# Patient Record
Sex: FEMALE | Race: WHITE | NOT HISPANIC OR LATINO | URBAN - METROPOLITAN AREA
[De-identification: names, ages, dates, MRNs, and addresses within clinical notes are randomized per-mention and may not be internally consistent; named-entity substitution may affect disease eponyms.]

---

## 2017-03-29 ENCOUNTER — EMERGENCY (EMERGENCY)
Facility: HOSPITAL | Age: 57
LOS: 1 days | End: 2017-03-29
Payer: MEDICAID

## 2017-03-29 PROBLEM — Z00.00 ENCOUNTER FOR PREVENTIVE HEALTH EXAMINATION: Status: ACTIVE | Noted: 2017-03-29

## 2017-03-29 PROCEDURE — 71250 CT THORAX DX C-: CPT | Mod: 26

## 2017-03-29 PROCEDURE — 99284 EMERGENCY DEPT VISIT MOD MDM: CPT

## 2022-10-08 ENCOUNTER — INPATIENT (INPATIENT)
Facility: HOSPITAL | Age: 62
LOS: 3 days | Discharge: HOME | DRG: 435 | End: 2022-10-12
Attending: SURGERY | Admitting: SURGERY
Payer: COMMERCIAL

## 2022-10-08 VITALS
DIASTOLIC BLOOD PRESSURE: 69 MMHG | WEIGHT: 104.94 LBS | TEMPERATURE: 98 F | RESPIRATION RATE: 18 BRPM | SYSTOLIC BLOOD PRESSURE: 172 MMHG | OXYGEN SATURATION: 98 % | HEART RATE: 66 BPM | HEIGHT: 64 IN

## 2022-10-08 DIAGNOSIS — K86.89 OTHER SPECIFIED DISEASES OF PANCREAS: ICD-10-CM

## 2022-10-08 LAB
APPEARANCE UR: CLEAR — SIGNIFICANT CHANGE UP
BILIRUB UR-MCNC: ABNORMAL
COLOR SPEC: YELLOW — SIGNIFICANT CHANGE UP
DIFF PNL FLD: NEGATIVE — SIGNIFICANT CHANGE UP
GLUCOSE UR QL: NEGATIVE — SIGNIFICANT CHANGE UP
KETONES UR-MCNC: ABNORMAL
LEUKOCYTE ESTERASE UR-ACNC: ABNORMAL
NITRITE UR-MCNC: NEGATIVE — SIGNIFICANT CHANGE UP
PH UR: 6.5 — SIGNIFICANT CHANGE UP (ref 5–8)
PROT UR-MCNC: 15
SP GR SPEC: 1 — LOW (ref 1.01–1.02)
UROBILINOGEN FLD QL: 4

## 2022-10-08 PROCEDURE — 88341 IMHCHEM/IMCYTCHM EA ADD ANTB: CPT

## 2022-10-08 PROCEDURE — 85610 PROTHROMBIN TIME: CPT

## 2022-10-08 PROCEDURE — 86901 BLOOD TYPING SEROLOGIC RH(D): CPT

## 2022-10-08 PROCEDURE — 86803 HEPATITIS C AB TEST: CPT

## 2022-10-08 PROCEDURE — 80076 HEPATIC FUNCTION PANEL: CPT

## 2022-10-08 PROCEDURE — 99222 1ST HOSP IP/OBS MODERATE 55: CPT

## 2022-10-08 PROCEDURE — 76000 FLUOROSCOPY <1 HR PHYS/QHP: CPT

## 2022-10-08 PROCEDURE — 80048 BASIC METABOLIC PNL TOTAL CA: CPT

## 2022-10-08 PROCEDURE — 83735 ASSAY OF MAGNESIUM: CPT

## 2022-10-08 PROCEDURE — 81001 URINALYSIS AUTO W/SCOPE: CPT

## 2022-10-08 PROCEDURE — U0003: CPT

## 2022-10-08 PROCEDURE — 99285 EMERGENCY DEPT VISIT HI MDM: CPT

## 2022-10-08 PROCEDURE — 36415 COLL VENOUS BLD VENIPUNCTURE: CPT

## 2022-10-08 PROCEDURE — C1769: CPT

## 2022-10-08 PROCEDURE — 93010 ELECTROCARDIOGRAM REPORT: CPT

## 2022-10-08 PROCEDURE — 88172 CYTP DX EVAL FNA 1ST EA SITE: CPT

## 2022-10-08 PROCEDURE — U0005: CPT

## 2022-10-08 PROCEDURE — 82150 ASSAY OF AMYLASE: CPT

## 2022-10-08 PROCEDURE — 85730 THROMBOPLASTIN TIME PARTIAL: CPT

## 2022-10-08 PROCEDURE — 71046 X-RAY EXAM CHEST 2 VIEWS: CPT | Mod: 26

## 2022-10-08 PROCEDURE — 83935 ASSAY OF URINE OSMOLALITY: CPT

## 2022-10-08 PROCEDURE — 84134 ASSAY OF PREALBUMIN: CPT

## 2022-10-08 PROCEDURE — 80053 COMPREHEN METABOLIC PANEL: CPT

## 2022-10-08 PROCEDURE — 86850 RBC ANTIBODY SCREEN: CPT

## 2022-10-08 PROCEDURE — 88307 TISSUE EXAM BY PATHOLOGIST: CPT

## 2022-10-08 PROCEDURE — 82247 BILIRUBIN TOTAL: CPT

## 2022-10-08 PROCEDURE — 85025 COMPLETE CBC W/AUTO DIFF WBC: CPT

## 2022-10-08 PROCEDURE — 82248 BILIRUBIN DIRECT: CPT

## 2022-10-08 PROCEDURE — C1876: CPT

## 2022-10-08 PROCEDURE — 85027 COMPLETE CBC AUTOMATED: CPT

## 2022-10-08 PROCEDURE — 83690 ASSAY OF LIPASE: CPT

## 2022-10-08 PROCEDURE — 93005 ELECTROCARDIOGRAM TRACING: CPT

## 2022-10-08 PROCEDURE — 84100 ASSAY OF PHOSPHORUS: CPT

## 2022-10-08 PROCEDURE — 86900 BLOOD TYPING SEROLOGIC ABO: CPT

## 2022-10-08 PROCEDURE — 88342 IMHCHEM/IMCYTCHM 1ST ANTB: CPT

## 2022-10-08 RX ORDER — ONDANSETRON 8 MG/1
4 TABLET, FILM COATED ORAL EVERY 6 HOURS
Refills: 0 | Status: DISCONTINUED | OUTPATIENT
Start: 2022-10-08 | End: 2022-10-12

## 2022-10-08 RX ORDER — SENNA PLUS 8.6 MG/1
2 TABLET ORAL AT BEDTIME
Refills: 0 | Status: DISCONTINUED | OUTPATIENT
Start: 2022-10-08 | End: 2022-10-12

## 2022-10-08 RX ORDER — KETOROLAC TROMETHAMINE 30 MG/ML
15 SYRINGE (ML) INJECTION ONCE
Refills: 0 | Status: DISCONTINUED | OUTPATIENT
Start: 2022-10-08 | End: 2022-10-11

## 2022-10-08 RX ORDER — ACETAMINOPHEN 500 MG
650 TABLET ORAL EVERY 6 HOURS
Refills: 0 | Status: DISCONTINUED | OUTPATIENT
Start: 2022-10-08 | End: 2022-10-12

## 2022-10-08 RX ORDER — OXYCODONE AND ACETAMINOPHEN 5; 325 MG/1; MG/1
2 TABLET ORAL EVERY 6 HOURS
Refills: 0 | Status: DISCONTINUED | OUTPATIENT
Start: 2022-10-08 | End: 2022-10-12

## 2022-10-08 RX ORDER — ZOLPIDEM TARTRATE 10 MG/1
5 TABLET ORAL AT BEDTIME
Refills: 0 | Status: DISCONTINUED | OUTPATIENT
Start: 2022-10-08 | End: 2022-10-12

## 2022-10-08 NOTE — ED PROVIDER NOTE - CLINICAL SUMMARY MEDICAL DECISION MAKING FREE TEXT BOX
Jaundice and abdominal pain with obstructing pancreatic mass.  Will admit to medicine with GI eval. Jaundice and abdominal pain with obstructing pancreatic mass.  Will admit to Surg Onc.

## 2022-10-08 NOTE — ED ADULT NURSE NOTE - CHIEF COMPLAINT QUOTE
Pt BIBEMS as transfer from [facility]. Pt states "I have been having abdominal pain they told me I had to be sent here because you guys have the doctors to treat me." Pt observed to have jaundice to scleras. As per EMS "We were told she has a mass near pancreatic head and gallbladder enlarged.

## 2022-10-08 NOTE — ED PROVIDER NOTE - PROGRESS NOTE DETAILS
Discussed case with surgery resident who already placed admission orders and wants patient admitted to .

## 2022-10-08 NOTE — ED PROVIDER NOTE - GASTROINTESTINAL, MLM
Abdomen soft, nondistended; nontender.  no rebound or guarding. scaphoid thin abdomen soft, nondistended; nontender.  no rebound or guarding.

## 2022-10-08 NOTE — ED ADULT NURSE NOTE - NSFALLRSKINDICATORS_ED_ALL_ED
[>50% of Time Spent on Coordination of Care for  ___] : Greater than 50% of the encounter time was spent on coordination of care for [unfilled] [Time Spent: ___ minutes] : I have spent [unfilled] minutes of face to face time with the patient no

## 2022-10-08 NOTE — ED PROVIDER NOTE - OBJECTIVE STATEMENT
Pt. is a 60 yo F with hx of COPD and occasional alcohol use presents as a transfer from [facility] [place] for pancreatic mass causing biliary obstruction.  Patient has jaundice and has had some mild abdominal pain for about a week. Patient was seen at an urgent care yesterday and had labs and US showing hyponatremia, T Bili of 20, and Abdominal US showing pancreatic lesion.  Patient states she was sent to the ER where she had workup including labs, urine, US and CT and was transferred here for GI and Surg onc evaluation. Pt. is a 62 yo F with hx of COPD and occasional alcohol use [place] a [place] [facility] [place] causing biliary obstruction.  Patient has jaundice and has had some mild abdominal pain for about a week. Patient was seen at an urgent care yesterday and had labs and US showing hyponatremia, T Bili of 20, and Abdominal US showing pancreatic lesion.  Patient states she was sent to the ER where she had workup including labs, urine, US and CT and was transferred here for GI and Surg onc evaluation, accepted by Dr. Tolentino. Pt. is a 62 yo F with hx of COPD and occasional alcohol use [place] [facility] [place].  Patient has jaundice which prompted her to seek medical care yesterday. Patient was seen at an urgent care yesterday and had labs and US showing hyponatremia, T Bili of 20, and Abdominal US showing pancreatic lesion.  Patient states she was sent to the ER in [place] around 7am this morning where she had workup including labs, urine, US and CT and was transferred here for Surg onc evaluation, accepted by Dr. Tolentino.

## 2022-10-08 NOTE — ED ADULT NURSE NOTE - OBJECTIVE STATEMENT
Pt BIBEMS as transfer from [facility]. Pt states "I have been having abdominal pain they told me I had to be sent here because you guys have the doctors to treat me." Pt observed to have jaundice at arrival. As per EMS "We were told she has a mass near pancreatic head and gallbladder enlarged.

## 2022-10-08 NOTE — ED ADULT TRIAGE NOTE - CHIEF COMPLAINT QUOTE
Pt BIBEMS as transfer from [facility]. Pt states "I have been having abdominal pain they told me I had to be sent here because you guys have the doctors to treat me." Pt observed to have jaundice to scleras. As per EMS "We were told she has a mass near pancreatic head and gallbladder enlarged.

## 2022-10-09 LAB
ABO RH CONFIRMATION: SIGNIFICANT CHANGE UP
ALBUMIN SERPL ELPH-MCNC: 2.5 G/DL — LOW (ref 3.3–5)
ALP SERPL-CCNC: >2330 U/L — HIGH (ref 40–120)
ALT FLD-CCNC: 948 U/L — HIGH (ref 12–78)
AMYLASE P1 CFR SERPL: 34 U/L — SIGNIFICANT CHANGE UP (ref 25–115)
ANION GAP SERPL CALC-SCNC: 8 MMOL/L — SIGNIFICANT CHANGE UP (ref 5–17)
APTT BLD: 32.6 SEC — SIGNIFICANT CHANGE UP (ref 27.5–35.5)
AST SERPL-CCNC: 807 U/L — HIGH (ref 15–37)
BASOPHILS # BLD AUTO: 0.07 K/UL — SIGNIFICANT CHANGE UP (ref 0–0.2)
BASOPHILS NFR BLD AUTO: 0.9 % — SIGNIFICANT CHANGE UP (ref 0–2)
BILIRUB SERPL-MCNC: 18.7 MG/DL — HIGH (ref 0.2–1.2)
BUN SERPL-MCNC: 7 MG/DL — SIGNIFICANT CHANGE UP (ref 7–23)
CALCIUM SERPL-MCNC: 9.1 MG/DL — SIGNIFICANT CHANGE UP (ref 8.5–10.1)
CHLORIDE SERPL-SCNC: 92 MMOL/L — LOW (ref 96–108)
CO2 SERPL-SCNC: 28 MMOL/L — SIGNIFICANT CHANGE UP (ref 22–31)
CREAT SERPL-MCNC: 0.53 MG/DL — SIGNIFICANT CHANGE UP (ref 0.5–1.3)
EGFR: 104 ML/MIN/1.73M2 — SIGNIFICANT CHANGE UP
EOSINOPHIL # BLD AUTO: 0.06 K/UL — SIGNIFICANT CHANGE UP (ref 0–0.5)
EOSINOPHIL NFR BLD AUTO: 0.8 % — SIGNIFICANT CHANGE UP (ref 0–6)
GLUCOSE SERPL-MCNC: 91 MG/DL — SIGNIFICANT CHANGE UP (ref 70–99)
HCT VFR BLD CALC: 29.4 % — LOW (ref 34.5–45)
HCV AB S/CO SERPL IA: 0.09 S/CO — SIGNIFICANT CHANGE UP (ref 0–0.99)
HCV AB SERPL-IMP: SIGNIFICANT CHANGE UP
HGB BLD-MCNC: 10.4 G/DL — LOW (ref 11.5–15.5)
IMM GRANULOCYTES NFR BLD AUTO: 0.5 % — SIGNIFICANT CHANGE UP (ref 0–0.9)
INR BLD: 1.05 RATIO — SIGNIFICANT CHANGE UP (ref 0.88–1.16)
LIDOCAIN IGE QN: 206 U/L — SIGNIFICANT CHANGE UP (ref 73–393)
LYMPHOCYTES # BLD AUTO: 0.96 K/UL — LOW (ref 1–3.3)
LYMPHOCYTES # BLD AUTO: 12.6 % — LOW (ref 13–44)
MAGNESIUM SERPL-MCNC: 2.1 MG/DL — SIGNIFICANT CHANGE UP (ref 1.6–2.6)
MCHC RBC-ENTMCNC: 30.9 PG — SIGNIFICANT CHANGE UP (ref 27–34)
MCHC RBC-ENTMCNC: 35.4 GM/DL — SIGNIFICANT CHANGE UP (ref 32–36)
MCV RBC AUTO: 87.2 FL — SIGNIFICANT CHANGE UP (ref 80–100)
MONOCYTES # BLD AUTO: 0.58 K/UL — SIGNIFICANT CHANGE UP (ref 0–0.9)
MONOCYTES NFR BLD AUTO: 7.6 % — SIGNIFICANT CHANGE UP (ref 2–14)
NEUTROPHILS # BLD AUTO: 5.91 K/UL — SIGNIFICANT CHANGE UP (ref 1.8–7.4)
NEUTROPHILS NFR BLD AUTO: 77.6 % — HIGH (ref 43–77)
OSMOLALITY UR: 226 MOSM/KG — SIGNIFICANT CHANGE UP (ref 50–1200)
PHOSPHATE SERPL-MCNC: 3 MG/DL — SIGNIFICANT CHANGE UP (ref 2.5–4.5)
PLATELET # BLD AUTO: 389 K/UL — SIGNIFICANT CHANGE UP (ref 150–400)
POTASSIUM SERPL-MCNC: 3.2 MMOL/L — LOW (ref 3.5–5.3)
POTASSIUM SERPL-SCNC: 3.2 MMOL/L — LOW (ref 3.5–5.3)
PREALB SERPL-MCNC: 8 MG/DL — LOW (ref 20–40)
PROT SERPL-MCNC: 6.1 GM/DL — SIGNIFICANT CHANGE UP (ref 6–8.3)
PROTHROM AB SERPL-ACNC: 12.2 SEC — SIGNIFICANT CHANGE UP (ref 10.5–13.4)
RBC # BLD: 3.37 M/UL — LOW (ref 3.8–5.2)
RBC # FLD: 18.5 % — HIGH (ref 10.3–14.5)
SODIUM SERPL-SCNC: 128 MMOL/L — LOW (ref 135–145)
WBC # BLD: 7.62 K/UL — SIGNIFICANT CHANGE UP (ref 3.8–10.5)
WBC # FLD AUTO: 7.62 K/UL — SIGNIFICANT CHANGE UP (ref 3.8–10.5)

## 2022-10-09 PROCEDURE — 99232 SBSQ HOSP IP/OBS MODERATE 35: CPT

## 2022-10-09 RX ORDER — HEPARIN SODIUM 5000 [USP'U]/ML
5000 INJECTION INTRAVENOUS; SUBCUTANEOUS EVERY 8 HOURS
Refills: 0 | Status: DISCONTINUED | OUTPATIENT
Start: 2022-10-09 | End: 2022-10-12

## 2022-10-09 RX ORDER — INFLUENZA VIRUS VACCINE 15; 15; 15; 15 UG/.5ML; UG/.5ML; UG/.5ML; UG/.5ML
0.5 SUSPENSION INTRAMUSCULAR ONCE
Refills: 0 | Status: DISCONTINUED | OUTPATIENT
Start: 2022-10-09 | End: 2022-10-12

## 2022-10-09 RX ADMIN — HEPARIN SODIUM 5000 UNIT(S): 5000 INJECTION INTRAVENOUS; SUBCUTANEOUS at 13:03

## 2022-10-09 RX ADMIN — ZOLPIDEM TARTRATE 5 MILLIGRAM(S): 10 TABLET ORAL at 21:27

## 2022-10-09 RX ADMIN — Medication 650 MILLIGRAM(S): at 19:32

## 2022-10-09 RX ADMIN — HEPARIN SODIUM 5000 UNIT(S): 5000 INJECTION INTRAVENOUS; SUBCUTANEOUS at 21:24

## 2022-10-09 NOTE — H&P ADULT - HISTORY OF PRESENT ILLNESS
62 yo F with hx of COPD (no inhaler), 1 ppd cigarette x 30+ years, social ETOH use presents as a transfer from [facility] w/ yellowing of skin noticed on 9/30 and mild right sided abdominal pain graded 2/10. Patient was seen at an urgent care yesterday and had labs and US showing hyponatremia 129, T Bili: 20, and Abdominal US showing pancreatic lesion.  Patient states she was sent to the [place] ER where she had workup including labs, urine, US and CT and was transferred here. Denies loss of appetite, unintentional weight loss, fevers, chills, change in bowel habits, nausea, vomiting.   PE: Jaundiced, mild RUQ tenderness  CT: Infiltrative mass involving the inferior pancreatic head and third portion of the duodenum. Differential includes primary pancreatic adenocarcinoma and duodenal carcinoma. Tumor encasement of the SMA. Distal CBD obstruction by tumor with moderate upstream biliary ductal dilatation.      MEDICATIONS  (STANDING):    MEDICATIONS  (PRN):  acetaminophen     Tablet .. 650 milliGRAM(s) Oral every 6 hours PRN Mild Pain (1 - 3)  ketorolac   Injectable 15 milliGRAM(s) IV Push once PRN Moderate Pain (4 - 6)  ondansetron Injectable 4 milliGRAM(s) IV Push every 6 hours PRN Nausea  oxycodone    5 mG/acetaminophen 325 mG 2 Tablet(s) Oral every 6 hours PRN Severe Pain (7 - 10)  senna 2 Tablet(s) Oral at bedtime PRN Constipation  zolpidem 5 milliGRAM(s) Oral at bedtime PRN Insomnia      PAST MEDICAL & SURGICAL HISTORY:  COPD (chronic obstructive pulmonary disease)          Vital Signs Last 24 Hrs  T(C): 36.2 (08 Oct 2022 23:04), Max: 36.6 (08 Oct 2022 22:19)  T(F): 97.2 (08 Oct 2022 23:04), Max: 97.8 (08 Oct 2022 22:19)  HR: 65 (08 Oct 2022 23:04) (65 - 66)  BP: 179/70 (08 Oct 2022 23:04) (172/69 - 179/70)  BP(mean): --  RR: 18 (08 Oct 2022 23:04) (18 - 18)  SpO2: 100% (08 Oct 2022 23:04) (98% - 100%)    Parameters below as of 08 Oct 2022 23:04  Patient On (Oxygen Delivery Method): room air        I&O's Summary                            10.4   7.62  )-----------( 389      ( 08 Oct 2022 23:05 )             29.4

## 2022-10-09 NOTE — H&P ADULT - NSHPPHYSICALEXAM_GEN_ALL_CORE
· CONSTITUTIONAL: Well appearing, awake, alert, oriented to person, place, time/situation and in no apparent distress.  · ENMT: Airway patent, Nasal mucosa clear. Mouth with normal mucosa. Throat has no vesicles, no oropharyngeal exudates and uvula is midline.  · EYES: +scleral icterus; pupils equal, round and reactive to light.  · CARDIAC: Normal rate, regular rhythm.  Heart sounds S1, S2.  No murmurs, rubs or gallops.  · RESPIRATORY: Breath sounds clear and equal bilaterally.  · GASTROINTESTINAL: scaphoid thin abdomen soft, nondistended; nontender.  no rebound or guarding.    · MUSCULOSKELETAL: Spine appears normal, range of motion is not limited, no muscle or joint tenderness  · NEUROLOGICAL: Alert and oriented, no focal deficits, no motor or sensory deficits.  · SKIN: +diffuse jaundice    · PSYCHIATRIC: Alert and oriented to person, place, time/situation. normal mood and affect. no apparent risk to self or others.  · HEME LYMPH: No adenopathy or splenomegaly. No cervical or inguinal lymphadenopathy.

## 2022-10-09 NOTE — H&P ADULT - NSHPLABSRESULTS_GEN_ALL_CORE
MEDICATIONS  (STANDING):    MEDICATIONS  (PRN):  acetaminophen     Tablet .. 650 milliGRAM(s) Oral every 6 hours PRN Mild Pain (1 - 3)  ketorolac   Injectable 15 milliGRAM(s) IV Push once PRN Moderate Pain (4 - 6)  ondansetron Injectable 4 milliGRAM(s) IV Push every 6 hours PRN Nausea  oxycodone    5 mG/acetaminophen 325 mG 2 Tablet(s) Oral every 6 hours PRN Severe Pain (7 - 10)  senna 2 Tablet(s) Oral at bedtime PRN Constipation  zolpidem 5 milliGRAM(s) Oral at bedtime PRN Insomnia      PAST MEDICAL & SURGICAL HISTORY:  COPD (chronic obstructive pulmonary disease)          Vital Signs Last 24 Hrs  T(C): 36.2 (08 Oct 2022 23:04), Max: 36.6 (08 Oct 2022 22:19)  T(F): 97.2 (08 Oct 2022 23:04), Max: 97.8 (08 Oct 2022 22:19)  HR: 65 (08 Oct 2022 23:04) (65 - 66)  BP: 179/70 (08 Oct 2022 23:04) (172/69 - 179/70)  BP(mean): --  RR: 18 (08 Oct 2022 23:04) (18 - 18)  SpO2: 100% (08 Oct 2022 23:04) (98% - 100%)    Parameters below as of 08 Oct 2022 23:04  Patient On (Oxygen Delivery Method): room air        I&O's Summary                            10.4   7.62  )-----------( 389      ( 08 Oct 2022 23:05 )             29.4       CT: Infiltrative mass involving the inferior pancreatic head and third portion of the duodenum. Differential includes primary pancreatic adenocarcinoma and duodenal carcinoma. Tumor encasement of the SMA. Distal CBD obstruction by tumor with moderate upstream biliary ductal dilatation.

## 2022-10-09 NOTE — H&P ADULT - ASSESSMENT
61 F w/ pancreatic head mass causing biliary obstruction     Plan:   Regular diet as tolerated   Pain control PRN   Anti-emetics   F/u GI consult for stenting   F/u Med onc consult to establish care- SMA encasement (likely not surgical candidate)  NPO @ midnight on Sunday for possible GI stenting Monday AM  trend LFTs daily w/ labs     Plan discussed with Dr. Tolentino

## 2022-10-10 LAB
ALBUMIN SERPL ELPH-MCNC: 2.2 G/DL — LOW (ref 3.3–5)
ALP SERPL-CCNC: >2330 U/L — SIGNIFICANT CHANGE UP (ref 40–120)
ALT FLD-CCNC: 971 U/L — HIGH (ref 12–78)
ANION GAP SERPL CALC-SCNC: 8 MMOL/L — SIGNIFICANT CHANGE UP (ref 5–17)
AST SERPL-CCNC: 928 U/L — HIGH (ref 15–37)
BILIRUB DIRECT SERPL-MCNC: 14.2 MG/DL — HIGH (ref 0–0.3)
BILIRUB SERPL-MCNC: 17.4 MG/DL — HIGH (ref 0.2–1.2)
BUN SERPL-MCNC: 7 MG/DL — SIGNIFICANT CHANGE UP (ref 7–23)
CALCIUM SERPL-MCNC: 8.7 MG/DL — SIGNIFICANT CHANGE UP (ref 8.5–10.1)
CHLORIDE SERPL-SCNC: 93 MMOL/L — LOW (ref 96–108)
CO2 SERPL-SCNC: 28 MMOL/L — SIGNIFICANT CHANGE UP (ref 22–31)
CREAT SERPL-MCNC: 0.63 MG/DL — SIGNIFICANT CHANGE UP (ref 0.5–1.3)
EGFR: 100 ML/MIN/1.73M2 — SIGNIFICANT CHANGE UP
GLUCOSE SERPL-MCNC: 102 MG/DL — HIGH (ref 70–99)
HCT VFR BLD CALC: 30.9 % — LOW (ref 34.5–45)
HGB BLD-MCNC: 11 G/DL — LOW (ref 11.5–15.5)
MAGNESIUM SERPL-MCNC: 2.3 MG/DL — SIGNIFICANT CHANGE UP (ref 1.6–2.6)
MCHC RBC-ENTMCNC: 30.2 PG — SIGNIFICANT CHANGE UP (ref 27–34)
MCHC RBC-ENTMCNC: 35.6 GM/DL — SIGNIFICANT CHANGE UP (ref 32–36)
MCV RBC AUTO: 84.9 FL — SIGNIFICANT CHANGE UP (ref 80–100)
PHOSPHATE SERPL-MCNC: 2.9 MG/DL — SIGNIFICANT CHANGE UP (ref 2.5–4.5)
PLATELET # BLD AUTO: 409 K/UL — HIGH (ref 150–400)
POTASSIUM SERPL-MCNC: 2.9 MMOL/L — CRITICAL LOW (ref 3.5–5.3)
POTASSIUM SERPL-SCNC: 2.9 MMOL/L — CRITICAL LOW (ref 3.5–5.3)
PROT SERPL-MCNC: 5.8 GM/DL — LOW (ref 6–8.3)
RBC # BLD: 3.64 M/UL — LOW (ref 3.8–5.2)
RBC # FLD: 18.1 % — HIGH (ref 10.3–14.5)
SARS-COV-2 RNA SPEC QL NAA+PROBE: SIGNIFICANT CHANGE UP
SODIUM SERPL-SCNC: 129 MMOL/L — LOW (ref 135–145)
WBC # BLD: 7.18 K/UL — SIGNIFICANT CHANGE UP (ref 3.8–10.5)
WBC # FLD AUTO: 7.18 K/UL — SIGNIFICANT CHANGE UP (ref 3.8–10.5)

## 2022-10-10 PROCEDURE — 99222 1ST HOSP IP/OBS MODERATE 55: CPT

## 2022-10-10 PROCEDURE — 99232 SBSQ HOSP IP/OBS MODERATE 35: CPT

## 2022-10-10 RX ORDER — SIMETHICONE 80 MG/1
80 TABLET, CHEWABLE ORAL EVERY 12 HOURS
Refills: 0 | Status: DISCONTINUED | OUTPATIENT
Start: 2022-10-10 | End: 2022-10-12

## 2022-10-10 RX ORDER — POTASSIUM CHLORIDE 20 MEQ
20 PACKET (EA) ORAL
Refills: 0 | Status: COMPLETED | OUTPATIENT
Start: 2022-10-10 | End: 2022-10-11

## 2022-10-10 RX ORDER — POTASSIUM CHLORIDE 20 MEQ
10 PACKET (EA) ORAL
Refills: 0 | Status: COMPLETED | OUTPATIENT
Start: 2022-10-10 | End: 2022-10-10

## 2022-10-10 RX ADMIN — Medication 20 MILLIEQUIVALENT(S): at 23:23

## 2022-10-10 RX ADMIN — SIMETHICONE 80 MILLIGRAM(S): 80 TABLET, CHEWABLE ORAL at 21:26

## 2022-10-10 RX ADMIN — HEPARIN SODIUM 5000 UNIT(S): 5000 INJECTION INTRAVENOUS; SUBCUTANEOUS at 13:24

## 2022-10-10 RX ADMIN — Medication 20 MILLIEQUIVALENT(S): at 21:26

## 2022-10-10 RX ADMIN — Medication 100 MILLIEQUIVALENT(S): at 09:48

## 2022-10-10 RX ADMIN — HEPARIN SODIUM 5000 UNIT(S): 5000 INJECTION INTRAVENOUS; SUBCUTANEOUS at 20:53

## 2022-10-10 RX ADMIN — Medication 100 MILLIEQUIVALENT(S): at 11:19

## 2022-10-10 RX ADMIN — Medication 100 MILLIEQUIVALENT(S): at 08:07

## 2022-10-10 NOTE — CONSULT NOTE ADULT - SUBJECTIVE AND OBJECTIVE BOX
HPI:  62 yo F with hx of COPD (no inhaler), 1 ppd cigarette x 30+ years, social ETOH use presents as a transfer from [facility] [place] w/ yellowing of skin noticed on 9/30 and mild right sided abdominal pain graded 2/10. Patient was seen at an urgent care yesterday and had labs and US showing hyponatremia 129, T Bili: 20, and Abdominal US showing pancreatic lesion.  Patient states she was sent to the [place] ER where she had workup including labs, urine, US and CT and was transferred here. Denies loss of appetite, unintentional weight loss, fevers, chills, change in bowel habits, nausea, vomiting.   PE: Jaundiced, mild RUQ tenderness  CT: Infiltrative mass involving the inferior pancreatic head and third portion of the duodenum. Differential includes primary pancreatic adenocarcinoma and duodenal carcinoma. Tumor encasement of the SMA. Distal CBD obstruction by tumor with moderate upstream biliary ductal dilatation.      MEDICATIONS  (STANDING):    MEDICATIONS  (PRN):  acetaminophen     Tablet .. 650 milliGRAM(s) Oral every 6 hours PRN Mild Pain (1 - 3)  ketorolac   Injectable 15 milliGRAM(s) IV Push once PRN Moderate Pain (4 - 6)  ondansetron Injectable 4 milliGRAM(s) IV Push every 6 hours PRN Nausea  oxycodone    5 mG/acetaminophen 325 mG 2 Tablet(s) Oral every 6 hours PRN Severe Pain (7 - 10)  senna 2 Tablet(s) Oral at bedtime PRN Constipation  zolpidem 5 milliGRAM(s) Oral at bedtime PRN Insomnia      PAST MEDICAL & SURGICAL HISTORY:  COPD (chronic obstructive pulmonary disease)    Social History:    FAMILY HISTORY:    ROS : denies pain or weight loss           Vital Signs Last 24 Hrs  T(C): 36.6 (10 Oct 2022 08:06), Max: 37.1 (09 Oct 2022 21:09)  T(F): 97.9 (10 Oct 2022 08:06), Max: 98.7 (09 Oct 2022 21:09)  HR: 74 (10 Oct 2022 08:06) (74 - 82)  BP: 137/75 (10 Oct 2022 08:06) (137/75 - 156/65)  BP(mean): --  RR: 17 (10 Oct 2022 08:06) (17 - 18)  SpO2: 98% (10 Oct 2022 08:06) (98% - 100%)    Parameters below as of 10 Oct 2022 08:06  Patient On (Oxygen Delivery Method): room air                          HPI:  60 yo F with hx of COPD (no inhaler), 1 ppd cigarette x 30+ years, social ETOH use presents as a transfer from [facility] [place] w/ yellowing of skin noticed on 9/30 and mild right sided abdominal pain graded 2/10. Patient was seen at an urgent care yesterday and had labs and US showing hyponatremia 129, T Bili: 20, and Abdominal US showing pancreatic lesion.  Patient states she was sent to the [place] ER where she had workup including labs, urine, US and CT and was transferred here. Denies loss of appetite, unintentional weight loss, fevers, chills, change in bowel habits, nausea, vomiting.   PE: Jaundiced, mild RUQ tenderness  CT: Infiltrative mass involving the inferior pancreatic head and third portion of the duodenum. Differential includes primary pancreatic adenocarcinoma and duodenal carcinoma. Tumor encasement of the SMA. Distal CBD obstruction by tumor with moderate upstream biliary ductal dilatation.      MEDICATIONS  (STANDING):    MEDICATIONS  (PRN):  acetaminophen     Tablet .. 650 milliGRAM(s) Oral every 6 hours PRN Mild Pain (1 - 3)  ketorolac   Injectable 15 milliGRAM(s) IV Push once PRN Moderate Pain (4 - 6)  ondansetron Injectable 4 milliGRAM(s) IV Push every 6 hours PRN Nausea  oxycodone    5 mG/acetaminophen 325 mG 2 Tablet(s) Oral every 6 hours PRN Severe Pain (7 - 10)  senna 2 Tablet(s) Oral at bedtime PRN Constipation  zolpidem 5 milliGRAM(s) Oral at bedtime PRN Insomnia      PAST MEDICAL & SURGICAL HISTORY:  COPD (chronic obstructive pulmonary disease)    Social History: smoker, social alcohol use, no drugs    FAMILY HISTORY: no Hx of GI cancers     ROS : denies pain or weight loss     Vital Signs Last 24 Hrs  T(C): 36.6 (10 Oct 2022 08:06), Max: 37.1 (09 Oct 2022 21:09)  T(F): 97.9 (10 Oct 2022 08:06), Max: 98.7 (09 Oct 2022 21:09)  HR: 74 (10 Oct 2022 08:06) (74 - 82)  BP: 137/75 (10 Oct 2022 08:06) (137/75 - 156/65)  BP(mean): --  RR: 17 (10 Oct 2022 08:06) (17 - 18)  SpO2: 98% (10 Oct 2022 08:06) (98% - 100%)    Parameters below as of 10 Oct 2022 08:06  Patient On (Oxygen Delivery Method): room air      Patient seen today in AM    Thin female   Jaundiced  No adenopathy  Lungs clear  Heart RRR  Abd soft non tender no ascites   Extr no edema  Neuro non focal  Psych normal affect                           11.0   7.18  )-----------( 409      ( 10 Oct 2022 06:30 )             30.9     Ct abd pelvis with iv contrast : infiltrative process in distal pancreatic head / duodenum , distal CBD obstruction, no liver mets or  overt adenopathy

## 2022-10-10 NOTE — CONSULT NOTE ADULT - SUBJECTIVE AND OBJECTIVE BOX
Patient is a 61y old  Female who presents with a chief complaint of Pancreatic mass (10 Oct 2022 06:20)    full note to follow      HPI:  62 yo F with hx of COPD (no inhaler), 1 ppd cigarette x 30+ years, social ETOH use presents as a transfer from [facility] [place] w/ yellowing of skin noticed on 9/30 and mild right sided abdominal pain graded 2/10. Patient was seen at an urgent care yesterday and had labs and US showing hyponatremia 129, T Bili: 20, and Abdominal US showing pancreatic lesion.  Patient states she was sent to the [place] ER where she had workup including labs, urine, US and CT and was transferred here. Denies loss of appetite, unintentional weight loss, fevers, chills, change in bowel habits, nausea, vomiting.   PE: Jaundiced, mild RUQ tenderness  CT: Infiltrative mass involving the inferior pancreatic head and third portion of the duodenum. Differential includes primary pancreatic adenocarcinoma and duodenal carcinoma. Tumor encasement of the SMA. Distal CBD obstruction by tumor with moderate upstream biliary ductal dilatation.      MEDICATIONS  (STANDING):    MEDICATIONS  (PRN):  acetaminophen     Tablet .. 650 milliGRAM(s) Oral every 6 hours PRN Mild Pain (1 - 3)  ketorolac   Injectable 15 milliGRAM(s) IV Push once PRN Moderate Pain (4 - 6)  ondansetron Injectable 4 milliGRAM(s) IV Push every 6 hours PRN Nausea  oxycodone    5 mG/acetaminophen 325 mG 2 Tablet(s) Oral every 6 hours PRN Severe Pain (7 - 10)  senna 2 Tablet(s) Oral at bedtime PRN Constipation  zolpidem 5 milliGRAM(s) Oral at bedtime PRN Insomnia      PAST MEDICAL & SURGICAL HISTORY:  COPD (chronic obstructive pulmonary disease)          Vital Signs Last 24 Hrs  T(C): 36.2 (08 Oct 2022 23:04), Max: 36.6 (08 Oct 2022 22:19)  T(F): 97.2 (08 Oct 2022 23:04), Max: 97.8 (08 Oct 2022 22:19)  HR: 65 (08 Oct 2022 23:04) (65 - 66)  BP: 179/70 (08 Oct 2022 23:04) (172/69 - 179/70)  BP(mean): --  RR: 18 (08 Oct 2022 23:04) (18 - 18)  SpO2: 100% (08 Oct 2022 23:04) (98% - 100%)    Parameters below as of 08 Oct 2022 23:04  Patient On (Oxygen Delivery Method): room air        I&O's Summary                            10.4   7.62  )-----------( 389      ( 08 Oct 2022 23:05 )             29.4                          (09 Oct 2022 00:11)      PAST MEDICAL & SURGICAL HISTORY:  COPD (chronic obstructive pulmonary disease)          MEDICATIONS  (STANDING):  heparin   Injectable 5000 Unit(s) SubCutaneous every 8 hours  influenza   Vaccine 0.5 milliLiter(s) IntraMuscular once    MEDICATIONS  (PRN):  acetaminophen     Tablet .. 650 milliGRAM(s) Oral every 6 hours PRN Mild Pain (1 - 3)  ketorolac   Injectable 15 milliGRAM(s) IV Push once PRN Moderate Pain (4 - 6)  ondansetron Injectable 4 milliGRAM(s) IV Push every 6 hours PRN Nausea  oxycodone    5 mG/acetaminophen 325 mG 2 Tablet(s) Oral every 6 hours PRN Severe Pain (7 - 10)  senna 2 Tablet(s) Oral at bedtime PRN Constipation  zolpidem 5 milliGRAM(s) Oral at bedtime PRN Insomnia      Allergies    No Known Allergies    Intolerances        SOCIAL HISTORY:    FAMILY HISTORY:      REVIEW OF SYSTEMS:    CONSTITUTIONAL: No weakness, fevers or chills  EYES/ENT: No visual changes;  No vertigo or throat pain   NECK: No pain or stiffness  RESPIRATORY: No cough, wheezing, hemoptysis; No shortness of breath  CARDIOVASCULAR: No chest pain or palpitations  GASTROINTESTINAL: No abdominal or epigastric pain. No nausea, vomiting, or hematemesis; No diarrhea or constipation. No melena or hematochezia.  GENITOURINARY: No dysuria, frequency or hematuria  NEUROLOGICAL: No numbness or weakness  SKIN: No itching, burning, rashes, or lesions   PSYCH: Normal mood and affect  All other review of systems is negative unless indicated above.    Vital Signs Last 24 Hrs  T(C): 37.1 (09 Oct 2022 21:09), Max: 37.1 (09 Oct 2022 21:09)  T(F): 98.7 (09 Oct 2022 21:09), Max: 98.7 (09 Oct 2022 21:09)  HR: 82 (09 Oct 2022 21:09) (70 - 82)  BP: 139/70 (09 Oct 2022 21:09) (139/70 - 156/65)  BP(mean): --  RR: 18 (09 Oct 2022 21:09) (18 - 18)  SpO2: 98% (09 Oct 2022 21:09) (98% - 100%)    Parameters below as of 09 Oct 2022 21:09  Patient On (Oxygen Delivery Method): room air        PHYSICAL EXAM:    Constitutional: No acute distress, well-developed, non-toxic appearing  HEENT: masked, good phonation, not icteric  Neck: supple, no lymphadenopathy  Respiratory: clear to ascultation bilaterally, no wheezing  Cardiovascular: S1 and S2, regular rate and rhythm, no murmurs rubs or gallops  Gastrointestinal: soft, non-tender, non-distended, +bowel sounds, no rebound or guarding, no surgical scars, no drains  Extremities: No peripheral edema, no cyanosis or clubbing  Vascular: 2+ peripheral pulses, no venous stasis  Neurological: A/O x 3, no focal deficits, no asterixis  Psychiatric: Normal mood, normal affect  Skin: No rashes, not jaundiced    LABS:                        11.0   7.18  )-----------( 409      ( 10 Oct 2022 06:30 )             30.9     10-10    129<L>  |  93<L>  |  7   ----------------------------<  102<H>  2.9<LL>   |  28  |  0.63    Ca    8.7      10 Oct 2022 06:30  Phos  2.9     10-10  Mg     2.3     10-10    TPro  5.8<L>  /  Alb  2.2<L>  /  TBili  17.4<H>  /  DBili  14.2<H>  /  AST  928<H>  /  ALT  971<H>  /  AlkPhos  x   10-10    PT/INR - ( 08 Oct 2022 23:05 )   PT: 12.2 sec;   INR: 1.05 ratio         PTT - ( 08 Oct 2022 23:05 )  PTT:32.6 sec  LIVER FUNCTIONS - ( 10 Oct 2022 06:30 )  Alb: 2.2 g/dL / Pro: 5.8 gm/dL / ALK PHOS: x     / ALT: 971 U/L / AST: 928 U/L / GGT: x             RADIOLOGY & ADDITIONAL STUDIES: Patient is a 61y old  Female who presents with a chief complaint of Pancreatic mass (10 Oct 2022 06:20)    61 year old woman with COPD, smoker 30 pack years, admitted to [facility] [place] and transferred here with jaundice due to pancreatic mass.     Patient states that last week noticed her skin and eyes turn yellow. This has never happened before. Denies any abdominal pain. Has some abdominal fullness and post prandial bloating but not true pain. No nausea or vomiting. Good appetite. No weight loss.  Denies any ETOH abuse, new mediation, tylenol use. Denies history of hepatitis. No travel history or sick contacts. No overt signs of GI bleeding like hematemesis, melena, hematochezia.     PAST MEDICAL & SURGICAL HISTORY:  COPD (chronic obstructive pulmonary disease)    No major abodminal surgery      MEDICATIONS  (STANDING):  heparin   Injectable 5000 Unit(s) SubCutaneous every 8 hours  influenza   Vaccine 0.5 milliLiter(s) IntraMuscular once    MEDICATIONS  (PRN):  acetaminophen     Tablet .. 650 milliGRAM(s) Oral every 6 hours PRN Mild Pain (1 - 3)  ketorolac   Injectable 15 milliGRAM(s) IV Push once PRN Moderate Pain (4 - 6)  ondansetron Injectable 4 milliGRAM(s) IV Push every 6 hours PRN Nausea  oxycodone    5 mG/acetaminophen 325 mG 2 Tablet(s) Oral every 6 hours PRN Severe Pain (7 - 10)  senna 2 Tablet(s) Oral at bedtime PRN Constipation  zolpidem 5 milliGRAM(s) Oral at bedtime PRN Insomnia      Allergies    No Known Allergies    Intolerances        SOCIAL HISTORY:  +smoker, no drugs, social alcohol    FAMILY HISTORY:  no colon or stomach cancer  no pancreatic cancer    REVIEW OF SYSTEMS:    CONSTITUTIONAL: No weakness, fevers or chills  EYES/ENT: No visual changes;  No vertigo or throat pain   NECK: No pain or stiffness  RESPIRATORY: No cough, wheezing, hemoptysis; No shortness of breath  CARDIOVASCULAR: No chest pain or palpitations  GASTROINTESTINAL: see HPI  GENITOURINARY: No dysuria, frequency or hematuria  NEUROLOGICAL: No numbness or weakness  SKIN: No itching, burning, rashes, or lesions, +jaundice  PSYCH: Normal mood and affect  All other review of systems is negative unless indicated above.    Vital Signs Last 24 Hrs  T(C): 37.1 (09 Oct 2022 21:09), Max: 37.1 (09 Oct 2022 21:09)  T(F): 98.7 (09 Oct 2022 21:09), Max: 98.7 (09 Oct 2022 21:09)  HR: 82 (09 Oct 2022 21:09) (70 - 82)  BP: 139/70 (09 Oct 2022 21:09) (139/70 - 156/65)  BP(mean): --  RR: 18 (09 Oct 2022 21:09) (18 - 18)  SpO2: 98% (09 Oct 2022 21:09) (98% - 100%)    Parameters below as of 09 Oct 2022 21:09  Patient On (Oxygen Delivery Method): room air        PHYSICAL EXAM:    Constitutional: No acute distress, thin, non-toxic appearing  HEENT: masked, good phonation, +icteric  Neck: supple, no lymphadenopathy  Respiratory: clear to ascultation bilaterally, no wheezing  Cardiovascular: S1 and S2, regular rate and rhythm, no murmurs rubs or gallops  Gastrointestinal: soft, non-tender, non-distended, +bowel sounds, no rebound or guarding, no surgical scars, no drains  Extremities: No peripheral edema, no cyanosis or clubbing  Vascular: 2+ peripheral pulses, no venous stasis  Neurological: A/O x 3, no focal deficits, no asterixis  Psychiatric: Normal mood, normal affect  Skin: No rashes, +jaundiced    LABS:                        11.0   7.18  )-----------( 409      ( 10 Oct 2022 06:30 )             30.9     10-10    129<L>  |  93<L>  |  7   ----------------------------<  102<H>  2.9<LL>   |  28  |  0.63    Ca    8.7      10 Oct 2022 06:30  Phos  2.9     10-10  Mg     2.3     10-10    TPro  5.8<L>  /  Alb  2.2<L>  /  TBili  17.4<H>  /  DBili  14.2<H>  /  AST  928<H>  /  ALT  971<H>  /  AlkPhos  x   10-10    PT/INR - ( 08 Oct 2022 23:05 )   PT: 12.2 sec;   INR: 1.05 ratio         PTT - ( 08 Oct 2022 23:05 )  PTT:32.6 sec  LIVER FUNCTIONS - ( 10 Oct 2022 06:30 )  Alb: 2.2 g/dL / Pro: 5.8 gm/dL / ALK PHOS: x     / ALT: 971 U/L / AST: 928 U/L / GGT: x             RADIOLOGY & ADDITIONAL STUDIES: reviewed, biliary obstruction with infiltration of pancreatic mass

## 2022-10-10 NOTE — CONSULT NOTE ADULT - ASSESSMENT
60 y/o female with painless jaundice due to pancreatic head mass. No overt distant mets on  imaging.   Clinical presentation suggestive of pancreatic cancer.   Seen by surgical oncology. Not a candidate for upfront surgery due to SMA encasement.  Tissue biopsy/ bilary stent.  Will need chemotherapy/ RT.  Will need mediport.  Chemotherapy- FOLFOX ( cannot give irinotecan due to high bili).  She will need outpatient follow up with medical oncology at [place] ( Dr Candelaria)  60 y/o female with painless jaundice due to pancreatic head mass. No overt distant mets on  imaging.   Clinical presentation suggestive of pancreatic cancer.   Seen by surgical oncology. Not a candidate for upfront surgery due to SMA encasement.  Tissue biopsy/ bilary stent scheduled for tomorrow  Will need chemotherapy/ RT.  Will need mediport.  Chemotherapy- FOLFOX ( cannot give irinotecan due to high bili).  She will need outpatient follow up with medical oncology at [place] ( Dr Candelaria).

## 2022-10-10 NOTE — PROGRESS NOTE ADULT - SUBJECTIVE AND OBJECTIVE BOX
SURGERY DAILY PROGRESS NOTE:     Subjective:  Patient seen and examined this AM at bedside. No acute events overnight and patient resting comfortably. Tolerated diet, no n/v. No abd pain. Denies fever/chills, shortness of breath, chest pain. VS reviewed    Objective:    MEDICATIONS  (STANDING):  heparin   Injectable 5000 Unit(s) SubCutaneous every 8 hours  influenza   Vaccine 0.5 milliLiter(s) IntraMuscular once    MEDICATIONS  (PRN):  acetaminophen     Tablet .. 650 milliGRAM(s) Oral every 6 hours PRN Mild Pain (1 - 3)  ketorolac   Injectable 15 milliGRAM(s) IV Push once PRN Moderate Pain (4 - 6)  ondansetron Injectable 4 milliGRAM(s) IV Push every 6 hours PRN Nausea  oxycodone    5 mG/acetaminophen 325 mG 2 Tablet(s) Oral every 6 hours PRN Severe Pain (7 - 10)  senna 2 Tablet(s) Oral at bedtime PRN Constipation  zolpidem 5 milliGRAM(s) Oral at bedtime PRN Insomnia      Vital Signs Last 24 Hrs  T(C): 37.1 (09 Oct 2022 21:09), Max: 37.1 (09 Oct 2022 21:09)  T(F): 98.7 (09 Oct 2022 21:09), Max: 98.7 (09 Oct 2022 21:09)  HR: 82 (09 Oct 2022 21:09) (70 - 82)  BP: 139/70 (09 Oct 2022 21:09) (139/70 - 156/65)  BP(mean): --  RR: 18 (09 Oct 2022 21:09) (18 - 18)  SpO2: 98% (09 Oct 2022 21:09) (98% - 100%)    Parameters below as of 09 Oct 2022 21:09  Patient On (Oxygen Delivery Method): room air          PHYSICAL EXAM   GENERAL: NAD, AOx3, well developed  HEAD: Atraumatic, normocephalic  EYES: EOMI, PERRLA, conjunctiva and sclera clear  ENT: moist mucous membrane  NECK: supple, No JVD, midline trachea  CHEST/LUNG: unlabored respirations b/l  Heart: S1, S2 normal  ABDOMEN: soft, nondistended, nontender  NERVOUS SYSTEM: AOx3, speech clear, no neuro-deficits  MSK: full ROM, no deformities  SKIN: warm to touch, no rash or lesions      I&O's Detail      Daily     Daily     LABS:                        10.4   7.62  )-----------( 389      ( 08 Oct 2022 23:05 )             29.4     10-    128<L>  |  92<L>  |  7   ----------------------------<  91  3.2<L>   |  28  |  0.53    Ca    9.1      08 Oct 2022 23:05  Phos  3.0     10-  Mg     2.1     10-    TPro  6.1  /  Alb  2.5<L>  /  TBili  18.7<H>  /  DBili  14.3<H>  /  AST  807<H>  /  ALT  948<H>  /  AlkPhos  >2330<H>  10-08    PT/INR - ( 08 Oct 2022 23:05 )   PT: 12.2 sec;   INR: 1.05 ratio         PTT - ( 08 Oct 2022 23:05 )  PTT:32.6 sec  Urinalysis Basic - ( 08 Oct 2022 23:06 )    Color: Yellow / Appearance: Clear / S.005 / pH: x  Gluc: x / Ketone: Trace  / Bili: Large / Urobili: 4   Blood: x / Protein: 15 / Nitrite: Negative   Leuk Esterase: Small / RBC: 0-2 /HPF / WBC 0-2   Sq Epi: x / Non Sq Epi: Few / Bacteria: Occasional        RADIOLOGY & ADDITIONAL STUDIES:

## 2022-10-10 NOTE — CONSULT NOTE ADULT - ASSESSMENT
full note to follow    for eus fna/ercp tomorrow (tuesday). please optimize patient's K.   will ask kimt to be part of biobanking study 61 year old woman with COPD, smoker 30 pack years, admitted to [facility] and [place] here with jaundice due to pancreatic mass.     For eus fna/ercp tomorrow (tuesday). Please optimize patient's K.   Patient agreed to biobanking study.   No current nausea, duodenal stent not needed.

## 2022-10-11 ENCOUNTER — RESULT REVIEW (OUTPATIENT)
Age: 62
End: 2022-10-11

## 2022-10-11 ENCOUNTER — NON-APPOINTMENT (OUTPATIENT)
Age: 62
End: 2022-10-11

## 2022-10-11 LAB
ALBUMIN SERPL ELPH-MCNC: 2.2 G/DL — LOW (ref 3.3–5)
ALP SERPL-CCNC: >2330 U/L — HIGH (ref 40–120)
ALT FLD-CCNC: 832 U/L — HIGH (ref 12–78)
ANION GAP SERPL CALC-SCNC: 9 MMOL/L — SIGNIFICANT CHANGE UP (ref 5–17)
APTT BLD: 37.5 SEC — HIGH (ref 27.5–35.5)
AST SERPL-CCNC: 634 U/L — HIGH (ref 15–37)
BILIRUB DIRECT SERPL-MCNC: 14.2 MG/DL — HIGH (ref 0–0.3)
BILIRUB INDIRECT FLD-MCNC: 4.5 MG/DL — HIGH (ref 0.2–1)
BILIRUB SERPL-MCNC: 18.7 MG/DL — HIGH (ref 0.2–1.2)
BUN SERPL-MCNC: 9 MG/DL — SIGNIFICANT CHANGE UP (ref 7–23)
CALCIUM SERPL-MCNC: 8.9 MG/DL — SIGNIFICANT CHANGE UP (ref 8.5–10.1)
CHLORIDE SERPL-SCNC: 93 MMOL/L — LOW (ref 96–108)
CO2 SERPL-SCNC: 25 MMOL/L — SIGNIFICANT CHANGE UP (ref 22–31)
CREAT SERPL-MCNC: 0.53 MG/DL — SIGNIFICANT CHANGE UP (ref 0.5–1.3)
EGFR: 104 ML/MIN/1.73M2 — SIGNIFICANT CHANGE UP
GLUCOSE SERPL-MCNC: 109 MG/DL — HIGH (ref 70–99)
HCT VFR BLD CALC: 32 % — LOW (ref 34.5–45)
HGB BLD-MCNC: 11.2 G/DL — LOW (ref 11.5–15.5)
INR BLD: 1.21 RATIO — HIGH (ref 0.88–1.16)
MAGNESIUM SERPL-MCNC: 2.4 MG/DL — SIGNIFICANT CHANGE UP (ref 1.6–2.6)
MCHC RBC-ENTMCNC: 30.5 PG — SIGNIFICANT CHANGE UP (ref 27–34)
MCHC RBC-ENTMCNC: 35 GM/DL — SIGNIFICANT CHANGE UP (ref 32–36)
MCV RBC AUTO: 87.2 FL — SIGNIFICANT CHANGE UP (ref 80–100)
PHOSPHATE SERPL-MCNC: 2.2 MG/DL — LOW (ref 2.5–4.5)
PLATELET # BLD AUTO: 436 K/UL — HIGH (ref 150–400)
POTASSIUM SERPL-MCNC: 3.9 MMOL/L — SIGNIFICANT CHANGE UP (ref 3.5–5.3)
POTASSIUM SERPL-SCNC: 3.9 MMOL/L — SIGNIFICANT CHANGE UP (ref 3.5–5.3)
PROT SERPL-MCNC: 5.7 GM/DL — LOW (ref 6–8.3)
PROTHROM AB SERPL-ACNC: 14.1 SEC — HIGH (ref 10.5–13.4)
RBC # BLD: 3.67 M/UL — LOW (ref 3.8–5.2)
RBC # FLD: 18.7 % — HIGH (ref 10.3–14.5)
SODIUM SERPL-SCNC: 127 MMOL/L — LOW (ref 135–145)
WBC # BLD: 7.45 K/UL — SIGNIFICANT CHANGE UP (ref 3.8–10.5)
WBC # FLD AUTO: 7.45 K/UL — SIGNIFICANT CHANGE UP (ref 3.8–10.5)

## 2022-10-11 PROCEDURE — 43242 EGD US FINE NEEDLE BX/ASPIR: CPT

## 2022-10-11 PROCEDURE — 88307 TISSUE EXAM BY PATHOLOGIST: CPT | Mod: 26

## 2022-10-11 PROCEDURE — 99232 SBSQ HOSP IP/OBS MODERATE 35: CPT

## 2022-10-11 PROCEDURE — 88342 IMHCHEM/IMCYTCHM 1ST ANTB: CPT | Mod: 26

## 2022-10-11 PROCEDURE — 43274 ERCP DUCT STENT PLACEMENT: CPT

## 2022-10-11 PROCEDURE — 88341 IMHCHEM/IMCYTCHM EA ADD ANTB: CPT | Mod: 26

## 2022-10-11 RX ORDER — FENTANYL CITRATE 50 UG/ML
50 INJECTION INTRAVENOUS
Refills: 0 | Status: DISCONTINUED | OUTPATIENT
Start: 2022-10-11 | End: 2022-10-11

## 2022-10-11 RX ORDER — OXYCODONE HYDROCHLORIDE 5 MG/1
5 TABLET ORAL ONCE
Refills: 0 | Status: DISCONTINUED | OUTPATIENT
Start: 2022-10-11 | End: 2022-10-11

## 2022-10-11 RX ORDER — ONDANSETRON 8 MG/1
4 TABLET, FILM COATED ORAL ONCE
Refills: 0 | Status: DISCONTINUED | OUTPATIENT
Start: 2022-10-11 | End: 2022-10-11

## 2022-10-11 RX ORDER — HYDRALAZINE HCL 50 MG
5 TABLET ORAL ONCE
Refills: 0 | Status: COMPLETED | OUTPATIENT
Start: 2022-10-11 | End: 2022-10-11

## 2022-10-11 RX ORDER — SODIUM CHLORIDE 9 MG/ML
1000 INJECTION, SOLUTION INTRAVENOUS
Refills: 0 | Status: DISCONTINUED | OUTPATIENT
Start: 2022-10-11 | End: 2022-10-11

## 2022-10-11 RX ADMIN — Medication 15 MILLIGRAM(S): at 08:05

## 2022-10-11 RX ADMIN — Medication 15 MILLIGRAM(S): at 09:01

## 2022-10-11 RX ADMIN — Medication 5 MILLIGRAM(S): at 16:25

## 2022-10-11 RX ADMIN — Medication 20 MILLIEQUIVALENT(S): at 00:24

## 2022-10-11 RX ADMIN — HEPARIN SODIUM 5000 UNIT(S): 5000 INJECTION INTRAVENOUS; SUBCUTANEOUS at 21:49

## 2022-10-11 NOTE — BRIEF OPERATIVE NOTE - COMMENTS
F/u LFT's  Advance diet as tolerated  Patient nearing obstruction in third portion, keep on low residue diet and d/w Dr. Tolentino, may need GJ soon.

## 2022-10-11 NOTE — PROGRESS NOTE ADULT - SUBJECTIVE AND OBJECTIVE BOX
SURGERY DAILY PROGRESS NOTE:     Subjective:  Patient seen and examined this AM at bedside. No acute events overnight and patient resting comfortably. Tuyet diet, no n/v/abd pain. Denies fever/chills, shortness of breath, chest pain. VS reviewed    Objective:    MEDICATIONS  (STANDING):  heparin   Injectable 5000 Unit(s) SubCutaneous every 8 hours  influenza   Vaccine 0.5 milliLiter(s) IntraMuscular once    MEDICATIONS  (PRN):  acetaminophen     Tablet .. 650 milliGRAM(s) Oral every 6 hours PRN Mild Pain (1 - 3)  ondansetron Injectable 4 milliGRAM(s) IV Push every 6 hours PRN Nausea  oxycodone    5 mG/acetaminophen 325 mG 2 Tablet(s) Oral every 6 hours PRN Severe Pain (7 - 10)  senna 2 Tablet(s) Oral at bedtime PRN Constipation  simethicone 80 milliGRAM(s) Chew every 12 hours PRN Indigestion  zolpidem 5 milliGRAM(s) Oral at bedtime PRN Insomnia      Vital Signs Last 24 Hrs  T(C): 37.1 (11 Oct 2022 08:00), Max: 37.2 (10 Oct 2022 21:20)  T(F): 98.8 (11 Oct 2022 08:00), Max: 99 (10 Oct 2022 21:20)  HR: 69 (11 Oct 2022 08:00) (69 - 77)  BP: 154/68 (11 Oct 2022 08:00) (128/88 - 154/82)  BP(mean): --  RR: 17 (11 Oct 2022 08:00) (17 - 18)  SpO2: 99% (11 Oct 2022 08:00) (98% - 99%)    Parameters below as of 11 Oct 2022 08:00  Patient On (Oxygen Delivery Method): room air          PHYSICAL EXAM   GENERAL: NAD, AOx3, well developed  HEAD: Atraumatic, normocephalic  EYES: EOMI, PERRLA, scleral icterus  ENT: moist mucous membrane  NECK: supple, No JVD, midline trachea  CHEST/LUNG: unlabored respirations b/l  Heart: S1, S2 normal  ABDOMEN: soft, nondistended, nontender  NERVOUS SYSTEM: AOx3, speech clear, no neuro-deficits  MSK: full ROM, no deformities  SKIN: jaundice    I&O's Detail      Daily     Daily     LABS:                        11.2   7.45  )-----------( 436      ( 11 Oct 2022 06:47 )             32.0     10-11    127<L>  |  93<L>  |  9   ----------------------------<  109<H>  3.9   |  25  |  0.53    Ca    8.9      11 Oct 2022 06:47  Phos  2.2     10-11  Mg     2.4     10-11    TPro  5.7<L>  /  Alb  2.2<L>  /  TBili  18.7<H>  /  DBili  14.2<H>  /  AST  634<H>  /  ALT  832<H>  /  AlkPhos  >2330<H>  10-11    PT/INR - ( 11 Oct 2022 06:47 )   PT: 14.1 sec;   INR: 1.21 ratio         PTT - ( 11 Oct 2022 06:47 )  PTT:37.5 sec      RADIOLOGY & ADDITIONAL STUDIES:

## 2022-10-11 NOTE — BRIEF OPERATIVE NOTE - OPERATION/FINDINGS
EUS FNA of 4 cm pancreatic head mass.   ERCP with sphincterotomy and placement of stent with excellent drainage.

## 2022-10-11 NOTE — PROGRESS NOTE ADULT - SUBJECTIVE AND OBJECTIVE BOX
HPI:  60 yo F with hx of COPD (no inhaler), 1 ppd cigarette x 30+ years, social ETOH use presents as a transfer from [facility] [place] [place] w/ yellowing of skin noticed on 9/30 and mild right sided abdominal pain graded 2/10. Patient was seen at an urgent care yesterday and had labs and US showing hyponatremia 129, T Bili: 20, and Abdominal US showing pancreatic lesion.  Patient states she was sent to the [place] ER where she had workup including labs, urine, US and CT and was transferred here. Denies loss of appetite, unintentional weight loss, fevers, chills, change in bowel habits, nausea, vomiting.   PE: Jaundiced, mild RUQ tenderness  CT: Infiltrative mass involving the inferior pancreatic head and third portion of the duodenum. Differential includes primary pancreatic adenocarcinoma and duodenal carcinoma. Tumor encasement of the SMA. Distal CBD obstruction by tumor with moderate upstream biliary ductal dilatation.    10/11/22 No new issues no new c/o. Awaiting ERCP stent placement  today.     MEDICATIONS  (STANDING):    MEDICATIONS  (PRN):  acetaminophen     Tablet .. 650 milliGRAM(s) Oral every 6 hours PRN Mild Pain (1 - 3)  ketorolac   Injectable 15 milliGRAM(s) IV Push once PRN Moderate Pain (4 - 6)  ondansetron Injectable 4 milliGRAM(s) IV Push every 6 hours PRN Nausea  oxycodone    5 mG/acetaminophen 325 mG 2 Tablet(s) Oral every 6 hours PRN Severe Pain (7 - 10)  senna 2 Tablet(s) Oral at bedtime PRN Constipation  zolpidem 5 milliGRAM(s) Oral at bedtime PRN Insomnia      PAST MEDICAL & SURGICAL HISTORY:  COPD (chronic obstructive pulmonary disease)    Social History: smoker, social alcohol use, no drugs    FAMILY HISTORY: no Hx of GI cancers     ROS : denies pain or weight loss     Vital Signs Last 24 Hrs  T(C): 37.1 (11 Oct 2022 08:00), Max: 37.2 (10 Oct 2022 21:20)  T(F): 98.8 (11 Oct 2022 08:00), Max: 99 (10 Oct 2022 21:20)  HR: 69 (11 Oct 2022 08:00) (69 - 77)  BP: 154/68 (11 Oct 2022 08:00) (128/88 - 154/82)  BP(mean): --  RR: 17 (11 Oct 2022 08:00) (17 - 18)  SpO2: 99% (11 Oct 2022 08:00) (98% - 99%)    Parameters below as of 11 Oct 2022 08:00  Patient On (Oxygen Delivery Method): room air      Thin female   Jaundiced  No adenopathy  Lungs clear  Heart RRR  Abd soft non tender no ascites   Extr no edema  Neuro non focal  Psych normal affect                           11.0   7.18  )-----------( 409      ( 10 Oct 2022 06:30 )             30.9     Ct abd pelvis with iv contrast : infiltrative process in distal pancreatic head / duodenum , distal CBD obstruction, no liver mets or  overt adenopathy

## 2022-10-12 ENCOUNTER — TRANSCRIPTION ENCOUNTER (OUTPATIENT)
Age: 62
End: 2022-10-12

## 2022-10-12 VITALS
OXYGEN SATURATION: 98 % | RESPIRATION RATE: 17 BRPM | SYSTOLIC BLOOD PRESSURE: 129 MMHG | HEART RATE: 80 BPM | DIASTOLIC BLOOD PRESSURE: 61 MMHG | TEMPERATURE: 98 F

## 2022-10-12 LAB
ALBUMIN SERPL ELPH-MCNC: 2.1 G/DL — LOW (ref 3.3–5)
ALP SERPL-CCNC: 2159 U/L — HIGH (ref 40–120)
ALT FLD-CCNC: 648 U/L — HIGH (ref 12–78)
ANION GAP SERPL CALC-SCNC: 7 MMOL/L — SIGNIFICANT CHANGE UP (ref 5–17)
AST SERPL-CCNC: 337 U/L — HIGH (ref 15–37)
BILIRUB DIRECT SERPL-MCNC: 6.5 MG/DL — HIGH (ref 0–0.3)
BILIRUB INDIRECT FLD-MCNC: 2 MG/DL — HIGH (ref 0.2–1)
BILIRUB SERPL-MCNC: 8.5 MG/DL — HIGH (ref 0.2–1.2)
BUN SERPL-MCNC: 16 MG/DL — SIGNIFICANT CHANGE UP (ref 7–23)
CALCIUM SERPL-MCNC: 8.2 MG/DL — LOW (ref 8.5–10.1)
CHLORIDE SERPL-SCNC: 94 MMOL/L — LOW (ref 96–108)
CO2 SERPL-SCNC: 24 MMOL/L — SIGNIFICANT CHANGE UP (ref 22–31)
CREAT SERPL-MCNC: 0.45 MG/DL — LOW (ref 0.5–1.3)
EGFR: 108 ML/MIN/1.73M2 — SIGNIFICANT CHANGE UP
GLUCOSE SERPL-MCNC: 120 MG/DL — HIGH (ref 70–99)
HCT VFR BLD CALC: 29.3 % — LOW (ref 34.5–45)
HGB BLD-MCNC: 10.3 G/DL — LOW (ref 11.5–15.5)
MAGNESIUM SERPL-MCNC: 2.1 MG/DL — SIGNIFICANT CHANGE UP (ref 1.6–2.6)
MCHC RBC-ENTMCNC: 31 PG — SIGNIFICANT CHANGE UP (ref 27–34)
MCHC RBC-ENTMCNC: 35.2 GM/DL — SIGNIFICANT CHANGE UP (ref 32–36)
MCV RBC AUTO: 88.3 FL — SIGNIFICANT CHANGE UP (ref 80–100)
PHOSPHATE SERPL-MCNC: 2.7 MG/DL — SIGNIFICANT CHANGE UP (ref 2.5–4.5)
PLATELET # BLD AUTO: 394 K/UL — SIGNIFICANT CHANGE UP (ref 150–400)
POTASSIUM SERPL-MCNC: 3.7 MMOL/L — SIGNIFICANT CHANGE UP (ref 3.5–5.3)
POTASSIUM SERPL-SCNC: 3.7 MMOL/L — SIGNIFICANT CHANGE UP (ref 3.5–5.3)
PROT SERPL-MCNC: 5.7 GM/DL — LOW (ref 6–8.3)
RBC # BLD: 3.32 M/UL — LOW (ref 3.8–5.2)
RBC # FLD: 17.8 % — HIGH (ref 10.3–14.5)
SODIUM SERPL-SCNC: 125 MMOL/L — LOW (ref 135–145)
WBC # BLD: 10.2 K/UL — SIGNIFICANT CHANGE UP (ref 3.8–10.5)
WBC # FLD AUTO: 10.2 K/UL — SIGNIFICANT CHANGE UP (ref 3.8–10.5)

## 2022-10-12 PROCEDURE — 99232 SBSQ HOSP IP/OBS MODERATE 35: CPT | Mod: GC

## 2022-10-12 PROCEDURE — 99238 HOSP IP/OBS DSCHRG MGMT 30/<: CPT | Mod: GC

## 2022-10-12 RX ADMIN — HEPARIN SODIUM 5000 UNIT(S): 5000 INJECTION INTRAVENOUS; SUBCUTANEOUS at 06:34

## 2022-10-12 NOTE — DISCHARGE NOTE PROVIDER - NSDCFUADDAPPT_GEN_ALL_CORE_FT
[facility]  Medical Oncology  [address] [place] [id number]

## 2022-10-12 NOTE — DISCHARGE NOTE PROVIDER - CARE PROVIDER_API CALL
Scout Frost)  Gastroenterology; Internal Medicine  [address], [place] [address]  Phone: [phone]  Fax: [phone]  Follow Up Time: 2 weeks    heme/onc,   Follow up with your own hematologist/oncologist  Phone: (   )    -  Fax: (   )    -  Follow Up Time:     Chandler Tolentino)  Surgery  [address] [facility] [address] [place] [address]  Phone: [phone]  Fax: [phone]  Follow Up Time: 2 weeks   Scout Frost)  Gastroenterology; Internal Medicine  [address] [facility] [facility], [place] [address]  Phone: [phone]  Fax: [phone]  Follow Up Time: 2 weeks    Chandler Tolentino)  Surgery  [address] [facility] [address] [place] [address]  Phone: [phone]  Fax: [phone]  Follow Up Time: 2 weeks    heme/onc,   Follow up with your own hematologist/oncologist  Phone: (   )    -  Fax: (   )    -  Follow Up Time:

## 2022-10-12 NOTE — PROGRESS NOTE ADULT - ASSESSMENT
61 F w/ pancreatic head mass causing biliary obstruction     Plan:   Pain control PRN   monitor VS  NPO  antiemetic control prn  F/u GI consult for stenting and biopsy today  appreciate med onc evaluation and recc  DVT PPX    Plan discussed with surgery team and attending, Dr. Montenegro
61 F w/ pancreatic head mass causing biliary obstruction     Plan:   Pain control PRN   monitor VS  NPO after midnight for possible GI intervention  antiemetic control prn  F/u GI consult for stenting   F/u Med onc consult to establish care- SMA encasement (likely not surgical candidate, need chemoradiation)  DVT PPX    Plan discussed with surgery team and attending, Dr. Montenegro  
62 y/o female with painless jaundice due to pancreatic head mass. No overt distant mets on  imaging.   Clinical presentation suggestive of pancreatic cancer.   Seen by surgical oncology. Not a candidate for upfront surgery due to SMA encasement.  Tissue biopsy/ bilary stent scheduled for tomorrow  Will need chemotherapy/ RT.  Will need mediport. Spoke with IR   Chemotherapy- FOLFOX ( cannot give irinotecan due to high bili).  She will need outpatient follow up with medical oncology at [place] ( Dr Candelaria). 
61 F w/ pancreatic head mass causing biliary obstruction     Plan:   Pain control PRN   monitor VS  NPO  antiemetic control prn  s/p EUS/FNA 4cm head mass, stent placement + drainage   needs mediport for folfox chemo  will f/u w/ IR   appreciate med onc evaluation and recc  DVT PPX    Plan discussed with surgery team and attending, Dr. Montenegro

## 2022-10-12 NOTE — DISCHARGE NOTE NURSING/CASE MANAGEMENT/SOCIAL WORK - NSDCPEFALRISK_GEN_ALL_CORE
For information on Fall & Injury Prevention, visit: [email or web address] OR  [email or web address] OR  https://www.cdc.gov/steadi/patient.html

## 2022-10-12 NOTE — PROGRESS NOTE ADULT - SUBJECTIVE AND OBJECTIVE BOX
SURGERY DAILY PROGRESS NOTE:     Subjective:  Patient seen and examined this AM at bedside. No acute events overnight and patient resting comfortably. Tuyet diet, no n/v/abd pain. Denies fever/chills, shortness of breath, chest pain. VS reviewed    Objective:    PHYSICAL EXAM   GENERAL: NAD, AOx3, well developed  HEAD: Atraumatic, normocephalic  EYES: EOMI, PERRLA, scleral icterus  ENT: moist mucous membrane  NECK: supple, No JVD, midline trachea  CHEST/LUNG: unlabored respirations b/l  Heart: S1, S2 normal  ABDOMEN: soft, nondistended, nontender  NERVOUS SYSTEM: AOx3, speech clear, no neuro-deficits  MSK: full ROM, no deformities  SKIN: jaundice         MEDICATIONS  (STANDING):  heparin   Injectable 5000 Unit(s) SubCutaneous every 8 hours  influenza   Vaccine 0.5 milliLiter(s) IntraMuscular once    MEDICATIONS  (PRN):  acetaminophen     Tablet .. 650 milliGRAM(s) Oral every 6 hours PRN Mild Pain (1 - 3)  ondansetron Injectable 4 milliGRAM(s) IV Push every 6 hours PRN Nausea  oxycodone    5 mG/acetaminophen 325 mG 2 Tablet(s) Oral every 6 hours PRN Severe Pain (7 - 10)  senna 2 Tablet(s) Oral at bedtime PRN Constipation  simethicone 80 milliGRAM(s) Chew every 12 hours PRN Indigestion  zolpidem 5 milliGRAM(s) Oral at bedtime PRN Insomnia      PAST MEDICAL & SURGICAL HISTORY:  COPD (chronic obstructive pulmonary disease)          Vital Signs Last 24 Hrs  T(C): 36.6 (12 Oct 2022 04:12), Max: 37.1 (11 Oct 2022 17:49)  T(F): 97.8 (12 Oct 2022 04:12), Max: 98.8 (11 Oct 2022 17:49)  HR: 84 (12 Oct 2022 04:12) (62 - 84)  BP: 143/88 (12 Oct 2022 04:12) (137/71 - 198/83)  BP(mean): --  RR: 18 (12 Oct 2022 04:12) (14 - 18)  SpO2: 99% (12 Oct 2022 04:12) (98% - 100%)    Parameters below as of 12 Oct 2022 04:12  Patient On (Oxygen Delivery Method): room air        I&O's Summary    11 Oct 2022 07:01  -  12 Oct 2022 07:00  --------------------------------------------------------  IN: 300 mL / OUT: 0 mL / NET: 300 mL                              10.3   10.20 )-----------( 394      ( 12 Oct 2022 07:08 )             29.3     10-12    125<L>  |  94<L>  |  16  ----------------------------<  120<H>  3.7   |  24  |  0.45<L>    Ca    8.2<L>      12 Oct 2022 07:08  Phos  2.7     10-12  Mg     2.1     10-12    TPro  5.7<L>  /  Alb  2.1<L>  /  TBili  8.5<H>  /  DBili  6.5<H>  /  AST  337<H>  /  ALT  648<H>  /  AlkPhos  2159<H>  10-12

## 2022-10-12 NOTE — DISCHARGE NOTE PROVIDER - HOSPITAL COURSE
60 yo F with hx of COPD (no inhaler), 1 ppd cigarette x 30+ years, social ETOH use presents as a transfer from [facility] w/ yellowing of skin noticed on 9/30 and mild right sided abdominal pain graded 2/10. Patient was seen at an urgent care yesterday and had labs and US showing hyponatremia 129, T Bili: 20, and Abdominal US showing pancreatic lesion.  Patient states she was sent to the [place] ER where she had workup including labs, urine, US and CT and was transferred here. Denies loss of appetite, unintentional weight loss, fevers, chills, change in bowel habits, nausea, vomiting.   PE: Jaundiced, mild RUQ tenderness  CT: Infiltrative mass involving the inferior pancreatic head and third portion of the duodenum. Differential includes primary pancreatic adenocarcinoma and duodenal carcinoma. Tumor encasement of the SMA. Distal CBD obstruction by tumor with moderate upstream biliary ductal dilatation.  Patient underwent stenting with GI on 10/11. Feels much better after the procedure. 62 yo F with hx of COPD (no inhaler), 1 ppd cigarette x 30+ years, social ETOH use presents as a transfer from [facility] [place] w/ yellowing of skin noticed on 9/30 and mild right sided abdominal pain graded 2/10. Patient was seen at an urgent care yesterday and had labs and US showing hyponatremia 129, T Bili: 20, and Abdominal US showing pancreatic lesion.  Patient states she was sent to the [place] ER where she had workup including labs, urine, US and CT and was transferred here. Denies loss of appetite, unintentional weight loss, fevers, chills, change in bowel habits, nausea, vomiting.   PE: Jaundiced, mild RUQ tenderness    CT: Infiltrative mass involving the inferior pancreatic head and third portion of the duodenum. Differential includes primary pancreatic adenocarcinoma and duodenal carcinoma. Tumor encasement of the SMA. Distal CBD obstruction by tumor with moderate upstream biliary ductal dilatation. Based on these findings she was felt not to be a surgical candidate given the locally advanced nature. No finding to suggest metastases.  Patient underwent biopsy and stenting with GI on 10/11. Feels much better after the procedure.

## 2022-10-12 NOTE — DISCHARGE NOTE PROVIDER - PROVIDER TOKENS
PROVIDER:[TOKEN:[[id number]],FOLLOWUP:[2 weeks]],FREE:[LAST:[heme/onc],PHONE:[(   )    -],FAX:[(   )    -],ADDRESS:[Follow up with your own hematologist/oncologist]],PROVIDER:[TOKEN:[[id number]],FOLLOWUP:[2 weeks]] PROVIDER:[TOKEN:[[id number]],FOLLOWUP:[2 weeks]],PROVIDER:[TOKEN:[[id number]],FOLLOWUP:[2 weeks]],FREE:[LAST:[heme/onc],PHONE:[(   )    -],FAX:[(   )    -],ADDRESS:[Follow up with your own hematologist/oncologist]]

## 2022-10-12 NOTE — DISCHARGE NOTE NURSING/CASE MANAGEMENT/SOCIAL WORK - PATIENT PORTAL LINK FT
You can access the FollowMyHealth Patient Portal offered by [facility] by registering at the following website: [email or web address]. By joining [facility]’s FollowMyHealth portal, you will also be able to view your health information using other applications (apps) compatible with our system.

## 2022-10-13 ENCOUNTER — NON-APPOINTMENT (OUTPATIENT)
Age: 62
End: 2022-10-13

## 2022-10-13 LAB — SURGICAL PATHOLOGY STUDY: SIGNIFICANT CHANGE UP

## 2022-10-14 NOTE — HISTORY OF PRESENT ILLNESS
[Jaundice] : jaundice [Darker Urine] : dark urine [Acholic stools] : acholic stools [ERCP] : ERCP [Endostent] : endostent [EUS] : EUS [Biopsy] : biopsy performed [Before Surgery] : before surgery [Pancreatic ductal adenocarcinoma] : Pancreatic ductal adenocarcinoma [Not staged outside] : not staged outside [Nutrition] : Nutrition [Social Work] : Social Work [Fully active, able to carry on all pre-disease performance without restriction] : Status 0 - Fully active, able to carry on all pre-disease performance without restriction [de-identified] : This is a non-billable note*\par \par \par Ms. DODIE QUINONES is a 61 year old female who presents for evaluation in our Pancreas [facility]idisciplinary [facility]. Her PMH includes: COPD, current smoker 1 ppd cigarette x 30+ years. PSH of hip replacement (12 years ago).  She initially presented to [facility] with jaundice that she noticed on , along with pale stools and dark urine. Presenting Tbili of 17 (on 10/8/22).  \par Abdominal US showing pancreatic lesion, and she was transferred to [facility] for further care.     CT A/P 10/8/22 noting an infiltrative mass involving the inferior pancreatic head and third portion of the duodenum; Tumor encasement of the SMA. Distal CBD obstruction by tumor with moderate upstream biliary ductal dilatation. \par EUS 10/11/22:  4 cm mass in the head of the pancreas with associated PD and CBD dilation. s/p FNA.  Pathology: adenocarcinoma. \par ERCP 10/11/22: Severe biliary stricture was found. sp biliary sphincterotomy and one 10 mm x 60 mm uncovered stent was placed into the common bile duct.\par \par Patient denies loss of appetite, unintentional weight loss, fevers, chills, change in bowel habits/steatorrhea, nausea, vomiting.  Reports improved jaundice post ERCP. \par ECO   Works in [employer], on feet often \par Family cancer history:  none \par \par Labs: \par 10/8/22   AST: 826    ALT:  882   ALP: 2606     Tbili: 17.7\par 10/12/22  AST: 337   ALT: 648    ALP: 2159    Tbili 8.5 \par \par not drawn yet * Ca 19-9:     CEA: \par  [TextBox_4] : 10/8/22 [TextBox_39] : [id number] [TextBox_41] : adenocarcinoma  [TextBox_43] : 10/11/22  [] : This is not a second opinion [TextBox_5] : 10/18/22 [FreeTextEntry4] : 8.5 [TextBox_40] : 10/18/22

## 2022-10-15 ENCOUNTER — APPOINTMENT (OUTPATIENT)
Dept: CT IMAGING | Facility: CLINIC | Age: 62
End: 2022-10-15

## 2022-10-15 PROCEDURE — 71250 CT THORAX DX C-: CPT

## 2022-10-16 ENCOUNTER — TRANSCRIPTION ENCOUNTER (OUTPATIENT)
Age: 62
End: 2022-10-16

## 2022-10-17 ENCOUNTER — OUTPATIENT (OUTPATIENT)
Dept: OUTPATIENT SERVICES | Facility: HOSPITAL | Age: 62
LOS: 1 days | End: 2022-10-17
Payer: MEDICARE

## 2022-10-17 DIAGNOSIS — C25.9 MALIGNANT NEOPLASM OF PANCREAS, UNSPECIFIED: ICD-10-CM

## 2022-10-18 ENCOUNTER — NON-APPOINTMENT (OUTPATIENT)
Age: 62
End: 2022-10-18

## 2022-10-18 ENCOUNTER — APPOINTMENT (OUTPATIENT)
Age: 62
End: 2022-10-18

## 2022-10-19 NOTE — CDI QUERY NOTE - NSCDIOTHERTXTBX_GEN_ALL_CORE_HH
This patient was admitted with a pancreatic mass and your clarification is needed regarding the path results:    Surgical Pathology Report:   ACCESSION No:  [id number]  Patient:   DODIE QUINONES  Accession: [id number]  Collected Date/Time:  10/11/2022 14:31 EDT  Received Date/Time:  10/12/2022 06:34 EDT    Surgical Pathology Report - Auth (Verified)    Specimen(s) Submitted  1  FNA head pancreatic mass    Final Diagnosis  BIOPSY AND CYTOLOGIC PREPARATIONS (PANCREATIC HEAD MASS, FINE-NEEDLE):  -   ADENOCARCINOMA.  SEE COMMENT.    Verified by: BLAKE WANG  (Electronic Signature)  Reported on: 10/13/22 15:09 EDT, [facility], [address] [place] [id number]      Is the above clinical criteria indicative of a further diagnosis?  A) Pancreatic cancer, ruled in.  B) Other, please specify:  C) Unable to determine.

## 2022-10-19 NOTE — CHART NOTE - NSCHARTNOTEFT_GEN_A_CORE
This 62 y/o female was admitted with a pancreatic mass that we now know is pancreatic adenocarcinoma. The information was communicated tot he patient and she is being referred to Dr Dooley for treatment.

## 2022-10-21 ENCOUNTER — RESULT REVIEW (OUTPATIENT)
Age: 62
End: 2022-10-21

## 2022-10-21 ENCOUNTER — NON-APPOINTMENT (OUTPATIENT)
Age: 62
End: 2022-10-21

## 2022-10-21 ENCOUNTER — APPOINTMENT (OUTPATIENT)
Age: 62
End: 2022-10-21

## 2022-10-21 VITALS
HEIGHT: 64 IN | BODY MASS INDEX: 16.76 KG/M2 | WEIGHT: 98.19 LBS | SYSTOLIC BLOOD PRESSURE: 167 MMHG | OXYGEN SATURATION: 99 % | DIASTOLIC BLOOD PRESSURE: 74 MMHG | TEMPERATURE: 97.7 F | HEART RATE: 58 BPM

## 2022-10-21 DIAGNOSIS — F17.200 NICOTINE DEPENDENCE, UNSPECIFIED, UNCOMPLICATED: ICD-10-CM

## 2022-10-21 DIAGNOSIS — Z78.9 OTHER SPECIFIED HEALTH STATUS: ICD-10-CM

## 2022-10-21 DIAGNOSIS — Z87.09 PERSONAL HISTORY OF OTHER DISEASES OF THE RESPIRATORY SYSTEM: ICD-10-CM

## 2022-10-21 LAB
ALBUMIN SERPL ELPH-MCNC: 3.2 G/DL
ALP BLD-CCNC: 921 U/L
ALT SERPL-CCNC: 80 U/L
ANION GAP SERPL CALC-SCNC: 12 MMOL/L
AST SERPL-CCNC: 35 U/L
BASOPHILS # BLD AUTO: 0.07 K/UL — SIGNIFICANT CHANGE UP (ref 0–0.2)
BASOPHILS NFR BLD AUTO: 0.9 % — SIGNIFICANT CHANGE UP (ref 0–2)
BILIRUB SERPL-MCNC: 2.8 MG/DL
BUN SERPL-MCNC: 9 MG/DL
CALCIUM SERPL-MCNC: 9.1 MG/DL
CANCER AG19-9 SERPL-ACNC: 48 U/ML
CHLORIDE SERPL-SCNC: 97 MMOL/L
CO2 SERPL-SCNC: 24 MMOL/L
CREAT SERPL-MCNC: 0.57 MG/DL
EGFR: 102 ML/MIN/1.73M2
EOSINOPHIL # BLD AUTO: 0.08 K/UL — SIGNIFICANT CHANGE UP (ref 0–0.5)
EOSINOPHIL NFR BLD AUTO: 1.1 % — SIGNIFICANT CHANGE UP (ref 0–6)
GLUCOSE SERPL-MCNC: 83 MG/DL
HCT VFR BLD CALC: 27.6 % — LOW (ref 34.5–45)
HGB BLD-MCNC: 9.1 G/DL — LOW (ref 11.5–15.5)
IMM GRANULOCYTES NFR BLD AUTO: 1.2 % — HIGH (ref 0–0.9)
LYMPHOCYTES # BLD AUTO: 1.58 K/UL — SIGNIFICANT CHANGE UP (ref 1–3.3)
LYMPHOCYTES # BLD AUTO: 21 % — SIGNIFICANT CHANGE UP (ref 13–44)
MCHC RBC-ENTMCNC: 30.8 PG — SIGNIFICANT CHANGE UP (ref 27–34)
MCHC RBC-ENTMCNC: 33 GM/DL — SIGNIFICANT CHANGE UP (ref 32–36)
MCV RBC AUTO: 93.6 FL — SIGNIFICANT CHANGE UP (ref 80–100)
MONOCYTES # BLD AUTO: 0.75 K/UL — SIGNIFICANT CHANGE UP (ref 0–0.9)
MONOCYTES NFR BLD AUTO: 10 % — SIGNIFICANT CHANGE UP (ref 2–14)
NEUTROPHILS # BLD AUTO: 4.96 K/UL — SIGNIFICANT CHANGE UP (ref 1.8–7.4)
NEUTROPHILS NFR BLD AUTO: 65.8 % — SIGNIFICANT CHANGE UP (ref 43–77)
NRBC # BLD: 0 /100 WBCS — SIGNIFICANT CHANGE UP (ref 0–0)
PLATELET # BLD AUTO: 462 K/UL — HIGH (ref 150–400)
POTASSIUM SERPL-SCNC: 4.7 MMOL/L
PROT SERPL-MCNC: 5.6 G/DL
RBC # BLD: 2.95 M/UL — LOW (ref 3.8–5.2)
RBC # FLD: 17.5 % — HIGH (ref 10.3–14.5)
SODIUM SERPL-SCNC: 133 MMOL/L
WBC # BLD: 7.53 K/UL — SIGNIFICANT CHANGE UP (ref 3.8–10.5)
WBC # FLD AUTO: 7.53 K/UL — SIGNIFICANT CHANGE UP (ref 3.8–10.5)

## 2022-10-21 PROCEDURE — 99215 OFFICE O/P EST HI 40 MIN: CPT

## 2022-10-21 RX ORDER — ALBUTEROL SULFATE 90 UG/1
108 (90 BASE) INHALANT RESPIRATORY (INHALATION)
Refills: 0 | Status: ACTIVE | COMMUNITY

## 2022-10-21 NOTE — REVIEW OF SYSTEMS
[Fatigue] : no fatigue [Recent Change In Weight] : ~T recent weight change [Dysphagia] : no dysphagia [Odynophagia] : no odynophagia [Chest Pain] : no chest pain [Shortness Of Breath] : no shortness of breath [Abdominal Pain] : no abdominal pain [Joint Pain] : no joint pain [Joint Stiffness] : no joint stiffness [Easy Bleeding] : no tendency for easy bleeding [Easy Bruising] : no tendency for easy bruising [Swollen Glands] : no swollen glands

## 2022-10-21 NOTE — RESULTS/DATA
[FreeTextEntry1] : Ms. Nagel is a pleasant 61 year-old woman with a newly diagnosed unresectable pancreatic cancer (at least T2), here to establish care.

## 2022-10-21 NOTE — REASON FOR VISIT
[Follow-Up Visit] : a follow-up [Family Member] : family member [FreeTextEntry2] : Pancreatic Cancer

## 2022-10-21 NOTE — PHYSICAL EXAM
[Restricted in physically strenuous activity but ambulatory and able to carry out work of a light or sedentary nature] : Status 1- Restricted in physically strenuous activity but ambulatory and able to carry out work of a light or sedentary nature, e.g., light house work, office work [Thin] : thin [Normal] : affect appropriate [de-identified] : scleral icterus [de-identified] : right chest wall mediport c/d/i

## 2022-10-21 NOTE — HISTORY OF PRESENT ILLNESS
[de-identified] : Ms. Nagel presented to [facility] with jaundice at the end of September, 2022.  Total bilirubin was 17.  Abdominal ultrasound showed a pancreatic lesion and she was transferred to [facility].\par \par CT imaging revealed an infiltrative mass involving the inferior pancreatic head and third portion of the duodenum, concerning for pancreatic adenocarcinoma.  Tumor encased (360 degrees) the superior mesenteric artery.  There was also distal CBD obstruction by tumor.  This necessitated an ERCP for diagnosis, as well as stent placement.\par \par Pathology obtained with Dr. Frost from the pancreatic head revealed adenocarcinoma, proficient mismatch repair.  Morphologically, the tissue resembled that of intestinal origin, with focal presence of goblet cells.  Felt to be an intestinal subtype of pancreatic ductal adenocarcinoma.  She was seen by Dr. Tolentino, who deemed her disease unresectable.\par \par Before our first consultation, she was readmitted to [facility] for strokelike symptoms and melena.  She is found to have a hemoglobin of 7g due to intestinal bleeding that was likely due to the sphincterotomy performed at ERCP.  CT imaging and of the brain and associated vessels was not concerning for an acute ischemic event.  She was transfused red blood cells, placed on octreotide and PPI, with resolution of the bleeding. Hemoglobin at discharge was 8.9g. Bilirubin 3.7.\par \par Ludivina is a daily smoker, and decreasing her intake from over one pack to about half a pack per day as of our first outpatient visit.  She has history of moderate alcohol intake, has stopped drinking since diagnosis.\par  [de-identified] : Denies ongoing blood loss since hospitalization. Received mediport this AM. No complications.

## 2022-10-22 DIAGNOSIS — J44.9 CHRONIC OBSTRUCTIVE PULMONARY DISEASE, UNSPECIFIED: ICD-10-CM

## 2022-10-22 DIAGNOSIS — K29.51 UNSPECIFIED CHRONIC GASTRITIS WITH BLEEDING: ICD-10-CM

## 2022-10-22 DIAGNOSIS — E87.1 HYPO-OSMOLALITY AND HYPONATREMIA: ICD-10-CM

## 2022-10-22 DIAGNOSIS — C25.9 MALIGNANT NEOPLASM OF PANCREAS, UNSPECIFIED: ICD-10-CM

## 2022-10-22 DIAGNOSIS — K83.1 OBSTRUCTION OF BILE DUCT: ICD-10-CM

## 2022-10-22 DIAGNOSIS — F17.210 NICOTINE DEPENDENCE, CIGARETTES, UNCOMPLICATED: ICD-10-CM

## 2022-10-22 LAB
HBV CORE IGG+IGM SER QL: NONREACTIVE
HBV SURFACE AB SER QL: REACTIVE
HBV SURFACE AB SERPL IA-ACNC: 23.8 MIU/ML
HBV SURFACE AG SER QL: NONREACTIVE
HCV AB SER QL: NONREACTIVE
HCV S/CO RATIO: 0.07 S/CO

## 2022-10-24 LAB — SURGICAL PATHOLOGY STUDY: SIGNIFICANT CHANGE UP

## 2022-10-25 ENCOUNTER — RESULT REVIEW (OUTPATIENT)
Age: 62
End: 2022-10-25

## 2022-10-25 ENCOUNTER — APPOINTMENT (OUTPATIENT)
Age: 62
End: 2022-10-25

## 2022-10-25 DIAGNOSIS — R11.2 NAUSEA WITH VOMITING, UNSPECIFIED: ICD-10-CM

## 2022-10-25 DIAGNOSIS — Z51.11 ENCOUNTER FOR ANTINEOPLASTIC CHEMOTHERAPY: ICD-10-CM

## 2022-10-25 LAB
ALBUMIN SERPL ELPH-MCNC: 3.4 G/DL — SIGNIFICANT CHANGE UP (ref 3.3–5)
ALP SERPL-CCNC: 840 U/L — HIGH (ref 40–120)
ALT FLD-CCNC: 42 U/L — SIGNIFICANT CHANGE UP (ref 10–45)
ANION GAP SERPL CALC-SCNC: 10 MMOL/L — SIGNIFICANT CHANGE UP (ref 5–17)
AST SERPL-CCNC: 21 U/L — SIGNIFICANT CHANGE UP (ref 10–40)
BASOPHILS # BLD AUTO: 0.07 K/UL — SIGNIFICANT CHANGE UP (ref 0–0.2)
BASOPHILS NFR BLD AUTO: 1 % — SIGNIFICANT CHANGE UP (ref 0–2)
BILIRUB SERPL-MCNC: 2.2 MG/DL — HIGH (ref 0.2–1.2)
BUN SERPL-MCNC: 13 MG/DL — SIGNIFICANT CHANGE UP (ref 7–23)
CALCIUM SERPL-MCNC: 8.9 MG/DL — SIGNIFICANT CHANGE UP (ref 8.4–10.5)
CHLORIDE SERPL-SCNC: 98 MMOL/L — SIGNIFICANT CHANGE UP (ref 96–108)
CO2 SERPL-SCNC: 24 MMOL/L — SIGNIFICANT CHANGE UP (ref 22–31)
CREAT SERPL-MCNC: 0.56 MG/DL — SIGNIFICANT CHANGE UP (ref 0.5–1.3)
EGFR: 102 ML/MIN/1.73M2 — SIGNIFICANT CHANGE UP
EOSINOPHIL # BLD AUTO: 0.06 K/UL — SIGNIFICANT CHANGE UP (ref 0–0.5)
EOSINOPHIL NFR BLD AUTO: 0.8 % — SIGNIFICANT CHANGE UP (ref 0–6)
GLUCOSE SERPL-MCNC: 103 MG/DL — HIGH (ref 70–99)
HCT VFR BLD CALC: 29.3 % — LOW (ref 34.5–45)
HGB BLD-MCNC: 9.8 G/DL — LOW (ref 11.5–15.5)
IMM GRANULOCYTES NFR BLD AUTO: 0.7 % — SIGNIFICANT CHANGE UP (ref 0–0.9)
LYMPHOCYTES # BLD AUTO: 1.19 K/UL — SIGNIFICANT CHANGE UP (ref 1–3.3)
LYMPHOCYTES # BLD AUTO: 16.7 % — SIGNIFICANT CHANGE UP (ref 13–44)
MAGNESIUM SERPL-MCNC: 2.3 MG/DL — SIGNIFICANT CHANGE UP (ref 1.6–2.6)
MCHC RBC-ENTMCNC: 31 PG — SIGNIFICANT CHANGE UP (ref 27–34)
MCHC RBC-ENTMCNC: 33.4 GM/DL — SIGNIFICANT CHANGE UP (ref 32–36)
MCV RBC AUTO: 92.7 FL — SIGNIFICANT CHANGE UP (ref 80–100)
MONOCYTES # BLD AUTO: 0.39 K/UL — SIGNIFICANT CHANGE UP (ref 0–0.9)
MONOCYTES NFR BLD AUTO: 5.5 % — SIGNIFICANT CHANGE UP (ref 2–14)
NEUTROPHILS # BLD AUTO: 5.35 K/UL — SIGNIFICANT CHANGE UP (ref 1.8–7.4)
NEUTROPHILS NFR BLD AUTO: 75.3 % — SIGNIFICANT CHANGE UP (ref 43–77)
NRBC # BLD: 0 /100 WBCS — SIGNIFICANT CHANGE UP (ref 0–0)
PLATELET # BLD AUTO: 449 K/UL — HIGH (ref 150–400)
POTASSIUM SERPL-MCNC: 4 MMOL/L — SIGNIFICANT CHANGE UP (ref 3.5–5.3)
POTASSIUM SERPL-SCNC: 4 MMOL/L — SIGNIFICANT CHANGE UP (ref 3.5–5.3)
PROT SERPL-MCNC: 5.9 G/DL — LOW (ref 6–8.3)
RBC # BLD: 3.16 M/UL — LOW (ref 3.8–5.2)
RBC # FLD: 16.3 % — HIGH (ref 10.3–14.5)
SODIUM SERPL-SCNC: 132 MMOL/L — LOW (ref 135–145)
WBC # BLD: 7.11 K/UL — SIGNIFICANT CHANGE UP (ref 3.8–10.5)
WBC # FLD AUTO: 7.11 K/UL — SIGNIFICANT CHANGE UP (ref 3.8–10.5)

## 2022-10-26 ENCOUNTER — NON-APPOINTMENT (OUTPATIENT)
Age: 62
End: 2022-10-26

## 2022-10-27 ENCOUNTER — APPOINTMENT (OUTPATIENT)
Age: 62
End: 2022-10-27

## 2022-10-28 DIAGNOSIS — Z51.89 ENCOUNTER FOR OTHER SPECIFIED AFTERCARE: ICD-10-CM

## 2022-11-01 ENCOUNTER — APPOINTMENT (OUTPATIENT)
Dept: PHYSICAL MEDICINE AND REHAB | Facility: CLINIC | Age: 62
End: 2022-11-01

## 2022-11-01 VITALS
HEIGHT: 64 IN | BODY MASS INDEX: 15.67 KG/M2 | WEIGHT: 91.8 LBS | HEART RATE: 94 BPM | DIASTOLIC BLOOD PRESSURE: 92 MMHG | TEMPERATURE: 97.7 F | OXYGEN SATURATION: 95 % | SYSTOLIC BLOOD PRESSURE: 137 MMHG

## 2022-11-01 DIAGNOSIS — G62.9 POLYNEUROPATHY, UNSPECIFIED: ICD-10-CM

## 2022-11-01 PROCEDURE — 99204 OFFICE O/P NEW MOD 45 MIN: CPT

## 2022-11-01 NOTE — PHYSICAL EXAM
[FreeTextEntry1] : Gen: Patient is A&O x 3, NAD\par HEENT: EOMI, hearing grossly normal\par Resp: regular, non - labored\par CV: pulses regular\par Skin: no rashes, erythema\par Lymph: no clubbing, cyanosis, edema, or palpable lymphadenopathy\par Inspection: no instability or misalignment\par ROM: full throughout\par Palpation:no tenderness to palpation\par Sensation: mild decrease to light touch in hands \par Reflexes: 1+ and symmetric throughout\par Strength: 5/5 throughout\par Special tests: -Hassan's sign\par Gait: normal, non-antalgic\par \par

## 2022-11-01 NOTE — DATA REVIEWED
[FreeTextEntry1] : CT head October 2022: Nonacute ischemic event involving the genu of the corpus callosum, more prominent on the left than the right.  Questionable small old left prefrontal gyrus peripherally located ischemic event.  No large arterial distribution acute infarct.  No acute intracranial hemorrhage.\par \par \par CT chest October 2022: Stable new lung nodules.\par Do bronchial opacity in the lingula bronchus.

## 2022-11-01 NOTE — HISTORY OF PRESENT ILLNESS
[FreeTextEntry1] : Ms. Nagel is a 61 year old female with pancreatic adenocarcinoma, at least T2 disease. Unresectable due to circumferential encasement of the SMA.  Chemotherapy with FOLFIRINOX. \par \par She reports that since beginning treatment she has had decrease in her appetite.  She reports she feels nauseous at times.  Concerned about losing weight.  Reports just mild numbness in her bilateral hands.  Denies any overt or focal weakness.  Denies any recent falls or balance dysfunction.  Reports pain currently controlled.\par

## 2022-11-01 NOTE — ASSESSMENT
[FreeTextEntry1] : 61 year old female presenting for evaluation.\par \par #Adenocarcinoma of pancreas:\par -Heme-onc notes reviewed, treatment with FOLFIRINOX\par -Surgical Oncology notes reviewed, s/p stenting with GI\par -CT chest reviewed\par -CMP reviewed, AST 21, ALT 42\par -Discussed risks and benefits of medical cannabis to improve appetite\par -Medical cannabis certification completed today. Provided cannabis education, overview of state program, and discussed adverse effects in detail. Counseled that vaporized cannabis is not the preferred route of administration due to the fact that both short-term and long-term risks associated with vaporizing oils are not yet fully understood. Recommend starting with 1:1 THC:CBD formulation at low dose of THC (~2mg/dose).\par -I Stop # [id number]\par \par #Neuropathy:\par -Early signs of neuropathy in bilateral hands, mostly negative symptoms\par -Discussed options for treatment, she would like to defer\par -Continue to monitor  \par \par Follow up in 6 weeks.

## 2022-11-08 ENCOUNTER — APPOINTMENT (OUTPATIENT)
Age: 62
End: 2022-11-08

## 2022-11-08 ENCOUNTER — RESULT REVIEW (OUTPATIENT)
Age: 62
End: 2022-11-08

## 2022-11-08 ENCOUNTER — NON-APPOINTMENT (OUTPATIENT)
Age: 62
End: 2022-11-08

## 2022-11-08 LAB
ALBUMIN SERPL ELPH-MCNC: 3.3 G/DL — SIGNIFICANT CHANGE UP (ref 3.3–5)
ALP SERPL-CCNC: 667 U/L — HIGH (ref 40–120)
ALT FLD-CCNC: 16 U/L — SIGNIFICANT CHANGE UP (ref 10–45)
ANION GAP SERPL CALC-SCNC: 15 MMOL/L — SIGNIFICANT CHANGE UP (ref 5–17)
AST SERPL-CCNC: 12 U/L — SIGNIFICANT CHANGE UP (ref 10–40)
BASOPHILS # BLD AUTO: 0.04 K/UL — SIGNIFICANT CHANGE UP (ref 0–0.2)
BASOPHILS NFR BLD AUTO: 0.3 % — SIGNIFICANT CHANGE UP (ref 0–2)
BILIRUB SERPL-MCNC: 1 MG/DL — SIGNIFICANT CHANGE UP (ref 0.2–1.2)
BUN SERPL-MCNC: 11 MG/DL — SIGNIFICANT CHANGE UP (ref 7–23)
CALCIUM SERPL-MCNC: 8.6 MG/DL — SIGNIFICANT CHANGE UP (ref 8.4–10.5)
CHLORIDE SERPL-SCNC: 97 MMOL/L — SIGNIFICANT CHANGE UP (ref 96–108)
CO2 SERPL-SCNC: 24 MMOL/L — SIGNIFICANT CHANGE UP (ref 22–31)
CREAT SERPL-MCNC: 0.56 MG/DL — SIGNIFICANT CHANGE UP (ref 0.5–1.3)
EGFR: 102 ML/MIN/1.73M2 — SIGNIFICANT CHANGE UP
EOSINOPHIL # BLD AUTO: 0.04 K/UL — SIGNIFICANT CHANGE UP (ref 0–0.5)
EOSINOPHIL NFR BLD AUTO: 0.3 % — SIGNIFICANT CHANGE UP (ref 0–6)
GLUCOSE SERPL-MCNC: 103 MG/DL — HIGH (ref 70–99)
HCT VFR BLD CALC: 30.6 % — LOW (ref 34.5–45)
HGB BLD-MCNC: 10.6 G/DL — LOW (ref 11.5–15.5)
IMM GRANULOCYTES NFR BLD AUTO: 1.8 % — HIGH (ref 0–0.9)
LYMPHOCYTES # BLD AUTO: 1.3 K/UL — SIGNIFICANT CHANGE UP (ref 1–3.3)
LYMPHOCYTES # BLD AUTO: 8.2 % — LOW (ref 13–44)
MCHC RBC-ENTMCNC: 31.4 PG — SIGNIFICANT CHANGE UP (ref 27–34)
MCHC RBC-ENTMCNC: 34.6 GM/DL — SIGNIFICANT CHANGE UP (ref 32–36)
MCV RBC AUTO: 90.5 FL — SIGNIFICANT CHANGE UP (ref 80–100)
MONOCYTES # BLD AUTO: 0.53 K/UL — SIGNIFICANT CHANGE UP (ref 0–0.9)
MONOCYTES NFR BLD AUTO: 3.4 % — SIGNIFICANT CHANGE UP (ref 2–14)
NEUTROPHILS # BLD AUTO: 13.6 K/UL — HIGH (ref 1.8–7.4)
NEUTROPHILS NFR BLD AUTO: 86 % — HIGH (ref 43–77)
NRBC # BLD: 0 /100 WBCS — SIGNIFICANT CHANGE UP (ref 0–0)
PLATELET # BLD AUTO: 288 K/UL — SIGNIFICANT CHANGE UP (ref 150–400)
POTASSIUM SERPL-MCNC: 2.9 MMOL/L — CRITICAL LOW (ref 3.5–5.3)
POTASSIUM SERPL-SCNC: 2.9 MMOL/L — CRITICAL LOW (ref 3.5–5.3)
PROT SERPL-MCNC: 5.7 G/DL — LOW (ref 6–8.3)
RBC # BLD: 3.38 M/UL — LOW (ref 3.8–5.2)
RBC # FLD: 15.9 % — HIGH (ref 10.3–14.5)
SODIUM SERPL-SCNC: 136 MMOL/L — SIGNIFICANT CHANGE UP (ref 135–145)
WBC # BLD: 15.8 K/UL — HIGH (ref 3.8–10.5)
WBC # FLD AUTO: 15.8 K/UL — HIGH (ref 3.8–10.5)

## 2022-11-08 PROCEDURE — 99213 OFFICE O/P EST LOW 20 MIN: CPT

## 2022-11-10 ENCOUNTER — APPOINTMENT (OUTPATIENT)
Age: 62
End: 2022-11-10

## 2022-11-10 ENCOUNTER — RESULT REVIEW (OUTPATIENT)
Age: 62
End: 2022-11-10

## 2022-11-10 LAB
POTASSIUM SERPL-MCNC: 3.4 MMOL/L — LOW (ref 3.5–5.3)
POTASSIUM SERPL-SCNC: 3.4 MMOL/L — LOW (ref 3.5–5.3)

## 2022-11-12 NOTE — HISTORY OF PRESENT ILLNESS
[de-identified] : Ms. Nagel presented to [facility] with jaundice at the end of September, 2022.  Total bilirubin was 17.  Abdominal ultrasound showed a pancreatic lesion and she was transferred to [facility].\par \par CT imaging revealed an infiltrative mass involving the inferior pancreatic head and third portion of the duodenum, concerning for pancreatic adenocarcinoma.  Tumor encased (360 degrees) the superior mesenteric artery.  There was also distal CBD obstruction by tumor.  This necessitated an ERCP for diagnosis, as well as stent placement.\par \par Pathology obtained with Dr. Frost from the pancreatic head revealed adenocarcinoma, proficient mismatch repair.  Morphologically, the tissue resembled that of intestinal origin, with focal presence of goblet cells.  Felt to be an intestinal subtype of pancreatic ductal adenocarcinoma.  She was seen by Dr. Tolentino, who deemed her disease unresectable.\par \par Before our first consultation, she was readmitted to [facility] for strokelike symptoms and melena.  She is found to have a hemoglobin of 7g due to intestinal bleeding that was likely due to the sphincterotomy performed at ERCP.  CT imaging and of the brain and associated vessels was not concerning for an acute ischemic event.  She was transfused red blood cells, placed on octreotide and PPI, with resolution of the bleeding. Hemoglobin at discharge was 8.9g. Bilirubin 3.7.\par \par Ludivina is a daily smoker, and decreasing her intake from over one pack to about half a pack per day as of our first outpatient visit.  She has history of moderate alcohol intake, has stopped drinking since diagnosis.\par  [de-identified] : Tolerated C1 FOLFIRINOX with minimal and expected toxicities. She continues to lose weight, however. States she is trying to eat better. Creon has been started.

## 2022-11-12 NOTE — PHYSICAL EXAM
[Restricted in physically strenuous activity but ambulatory and able to carry out work of a light or sedentary nature] : Status 1- Restricted in physically strenuous activity but ambulatory and able to carry out work of a light or sedentary nature, e.g., light house work, office work [Thin] : thin [Normal] : affect appropriate [de-identified] : right chest wall mediport c/d/i

## 2022-11-12 NOTE — RESULTS/DATA
[FreeTextEntry1] : Ms. Nagel is a pleasant 61 year-old woman with a newly diagnosed unresectable pancreatic cancer (at least T2), on FOLFIRINOX.

## 2022-11-12 NOTE — REVIEW OF SYSTEMS
[Recent Change In Weight] : ~T recent weight change [Fatigue] : no fatigue [Dysphagia] : no dysphagia [Odynophagia] : no odynophagia [Chest Pain] : no chest pain [Shortness Of Breath] : no shortness of breath [Abdominal Pain] : no abdominal pain [Joint Pain] : no joint pain [Joint Stiffness] : no joint stiffness [Easy Bleeding] : no tendency for easy bleeding [Easy Bruising] : no tendency for easy bruising [Swollen Glands] : no swollen glands

## 2022-11-16 ENCOUNTER — NON-APPOINTMENT (OUTPATIENT)
Age: 62
End: 2022-11-16

## 2022-11-28 ENCOUNTER — LABORATORY RESULT (OUTPATIENT)
Age: 62
End: 2022-11-28

## 2022-11-28 ENCOUNTER — RESULT REVIEW (OUTPATIENT)
Age: 62
End: 2022-11-28

## 2022-11-28 ENCOUNTER — APPOINTMENT (OUTPATIENT)
Dept: HEMATOLOGY ONCOLOGY | Facility: CLINIC | Age: 62
End: 2022-11-28

## 2022-11-28 ENCOUNTER — APPOINTMENT (OUTPATIENT)
Age: 62
End: 2022-11-28

## 2022-11-28 VITALS
HEART RATE: 81 BPM | WEIGHT: 93.04 LBS | TEMPERATURE: 96.5 F | SYSTOLIC BLOOD PRESSURE: 149 MMHG | BODY MASS INDEX: 15.97 KG/M2 | DIASTOLIC BLOOD PRESSURE: 89 MMHG | RESPIRATION RATE: 18 BRPM

## 2022-11-28 LAB
ALBUMIN SERPL ELPH-MCNC: 3 G/DL — LOW (ref 3.3–5)
ALP SERPL-CCNC: 248 U/L — HIGH (ref 40–120)
ALT FLD-CCNC: 10 U/L — SIGNIFICANT CHANGE UP (ref 10–45)
ANION GAP SERPL CALC-SCNC: 13 MMOL/L — SIGNIFICANT CHANGE UP (ref 5–17)
AST SERPL-CCNC: 16 U/L — SIGNIFICANT CHANGE UP (ref 10–40)
BASOPHILS # BLD AUTO: 0.11 K/UL — SIGNIFICANT CHANGE UP (ref 0–0.2)
BASOPHILS NFR BLD AUTO: 0.9 % — SIGNIFICANT CHANGE UP (ref 0–2)
BILIRUB SERPL-MCNC: 0.3 MG/DL — SIGNIFICANT CHANGE UP (ref 0.2–1.2)
BUN SERPL-MCNC: 11 MG/DL — SIGNIFICANT CHANGE UP (ref 7–23)
CALCIUM SERPL-MCNC: 8.9 MG/DL — SIGNIFICANT CHANGE UP (ref 8.4–10.5)
CHLORIDE SERPL-SCNC: 98 MMOL/L — SIGNIFICANT CHANGE UP (ref 96–108)
CO2 SERPL-SCNC: 26 MMOL/L — SIGNIFICANT CHANGE UP (ref 22–31)
CREAT SERPL-MCNC: 0.5 MG/DL — SIGNIFICANT CHANGE UP (ref 0.5–1.3)
EGFR: 105 ML/MIN/1.73M2 — SIGNIFICANT CHANGE UP
EOSINOPHIL # BLD AUTO: 0.08 K/UL — SIGNIFICANT CHANGE UP (ref 0–0.5)
EOSINOPHIL NFR BLD AUTO: 0.6 % — SIGNIFICANT CHANGE UP (ref 0–6)
GLUCOSE SERPL-MCNC: 136 MG/DL — HIGH (ref 70–99)
HCT VFR BLD CALC: 29.8 % — LOW (ref 34.5–45)
HGB BLD-MCNC: 10.1 G/DL — LOW (ref 11.5–15.5)
IMM GRANULOCYTES NFR BLD AUTO: 0.9 % — SIGNIFICANT CHANGE UP (ref 0–0.9)
LYMPHOCYTES # BLD AUTO: 1.61 K/UL — SIGNIFICANT CHANGE UP (ref 1–3.3)
LYMPHOCYTES # BLD AUTO: 13 % — SIGNIFICANT CHANGE UP (ref 13–44)
MCHC RBC-ENTMCNC: 31.1 PG — SIGNIFICANT CHANGE UP (ref 27–34)
MCHC RBC-ENTMCNC: 33.9 GM/DL — SIGNIFICANT CHANGE UP (ref 32–36)
MCV RBC AUTO: 91.7 FL — SIGNIFICANT CHANGE UP (ref 80–100)
MONOCYTES # BLD AUTO: 0.46 K/UL — SIGNIFICANT CHANGE UP (ref 0–0.9)
MONOCYTES NFR BLD AUTO: 3.7 % — SIGNIFICANT CHANGE UP (ref 2–14)
NEUTROPHILS # BLD AUTO: 9.97 K/UL — HIGH (ref 1.8–7.4)
NEUTROPHILS NFR BLD AUTO: 80.9 % — HIGH (ref 43–77)
NRBC # BLD: 0 /100 WBCS — SIGNIFICANT CHANGE UP (ref 0–0)
PLATELET # BLD AUTO: 346 K/UL — SIGNIFICANT CHANGE UP (ref 150–400)
POTASSIUM SERPL-MCNC: 2.9 MMOL/L — CRITICAL LOW (ref 3.5–5.3)
POTASSIUM SERPL-SCNC: 2.9 MMOL/L — CRITICAL LOW (ref 3.5–5.3)
PROT SERPL-MCNC: 5.8 G/DL — LOW (ref 6–8.3)
RBC # BLD: 3.25 M/UL — LOW (ref 3.8–5.2)
RBC # FLD: 15.7 % — HIGH (ref 10.3–14.5)
SODIUM SERPL-SCNC: 137 MMOL/L — SIGNIFICANT CHANGE UP (ref 135–145)
WBC # BLD: 12.34 K/UL — HIGH (ref 3.8–10.5)
WBC # FLD AUTO: 12.34 K/UL — HIGH (ref 3.8–10.5)

## 2022-11-28 PROCEDURE — 99214 OFFICE O/P EST MOD 30 MIN: CPT

## 2022-11-29 ENCOUNTER — NON-APPOINTMENT (OUTPATIENT)
Age: 62
End: 2022-11-29

## 2022-11-30 ENCOUNTER — APPOINTMENT (OUTPATIENT)
Age: 62
End: 2022-11-30

## 2022-12-02 NOTE — REVIEW OF SYSTEMS
[Recent Change In Weight] : ~T recent weight change [Fatigue] : no fatigue [Dysphagia] : no dysphagia [Nosebleeds] : no nosebleeds [Odynophagia] : no odynophagia [Chest Pain] : no chest pain [Palpitations] : no palpitations [Lower Ext Edema] : no lower extremity edema [Shortness Of Breath] : no shortness of breath [Wheezing] : no wheezing [Cough] : no cough [SOB on Exertion] : no shortness of breath during exertion [Abdominal Pain] : no abdominal pain [Vomiting] : no vomiting [Constipation] : no constipation [Diarrhea] : no diarrhea [Joint Pain] : no joint pain [Joint Stiffness] : no joint stiffness [Muscle Pain] : no muscle pain [Muscle Weakness] : no muscle weakness [Skin Rash] : no skin rash [Dizziness] : no dizziness [Difficulty Walking] : no difficulty walking [Easy Bleeding] : no tendency for easy bleeding [Easy Bruising] : no tendency for easy bruising [Swollen Glands] : no swollen glands [FreeTextEntry2] : Weight up 3 lbs

## 2022-12-02 NOTE — PHYSICAL EXAM
[Restricted in physically strenuous activity but ambulatory and able to carry out work of a light or sedentary nature] : Status 1- Restricted in physically strenuous activity but ambulatory and able to carry out work of a light or sedentary nature, e.g., light house work, office work [Thin] : thin [Normal] : affect appropriate [de-identified] : right chest wall mediport c/d/i

## 2022-12-02 NOTE — HISTORY OF PRESENT ILLNESS
[de-identified] : Ms. Nagel presented to [facility] with jaundice at the end of September, 2022.  Total bilirubin was 17.  Abdominal ultrasound showed a pancreatic lesion and she was transferred to [facility].\par \par CT imaging revealed an infiltrative mass involving the inferior pancreatic head and third portion of the duodenum, concerning for pancreatic adenocarcinoma.  Tumor encased (360 degrees) the superior mesenteric artery.  There was also distal CBD obstruction by tumor.  This necessitated an ERCP for diagnosis, as well as stent placement.\par \par Pathology obtained with Dr. Frost from the pancreatic head revealed adenocarcinoma, proficient mismatch repair.  Morphologically, the tissue resembled that of intestinal origin, with focal presence of goblet cells.  Felt to be an intestinal subtype of pancreatic ductal adenocarcinoma.  She was seen by Dr. Tolentino, who deemed her disease unresectable.\par \par Before our first consultation, she was readmitted to [facility] for strokelike symptoms and melena.  She is found to have a hemoglobin of 7g due to intestinal bleeding that was likely due to the sphincterotomy performed at ERCP.  CT imaging and of the brain and associated vessels was not concerning for an acute ischemic event.  She was transfused red blood cells, placed on octreotide and PPI, with resolution of the bleeding. Hemoglobin at discharge was 8.9g. Bilirubin 3.7.\par \par Ludivina is a daily smoker, and decreasing her intake from over one pack to about half a pack per day as of our first outpatient visit.  She has history of moderate alcohol intake, has stopped drinking since diagnosis.\par \par 10/25/22- Started FOLFIRONIOX + Onpro\par  [de-identified] : Tolerated first 2 cycles of FOLFIRINOX with minimal and expected toxicities. She reports her appetite is improved, gained 3 lbs in 2 weeks. She is taking medical marijuana and Creon. No fevers, N/V/D.  States she is feeling much better.

## 2022-12-13 ENCOUNTER — APPOINTMENT (OUTPATIENT)
Age: 62
End: 2022-12-13

## 2022-12-13 ENCOUNTER — RESULT REVIEW (OUTPATIENT)
Age: 62
End: 2022-12-13

## 2022-12-13 ENCOUNTER — NON-APPOINTMENT (OUTPATIENT)
Age: 62
End: 2022-12-13

## 2022-12-13 ENCOUNTER — APPOINTMENT (OUTPATIENT)
Age: 62
End: 2022-12-13
Payer: COMMERCIAL

## 2022-12-13 VITALS
SYSTOLIC BLOOD PRESSURE: 151 MMHG | DIASTOLIC BLOOD PRESSURE: 71 MMHG | TEMPERATURE: 97.4 F | WEIGHT: 89 LBS | HEART RATE: 80 BPM | BODY MASS INDEX: 15.28 KG/M2 | RESPIRATION RATE: 14 BRPM

## 2022-12-13 LAB
ALBUMIN SERPL ELPH-MCNC: 3.5 G/DL — SIGNIFICANT CHANGE UP (ref 3.3–5)
ALP SERPL-CCNC: 301 U/L — HIGH (ref 40–120)
ALT FLD-CCNC: 9 U/L — LOW (ref 10–45)
ANION GAP SERPL CALC-SCNC: 11 MMOL/L — SIGNIFICANT CHANGE UP (ref 5–17)
AST SERPL-CCNC: 9 U/L — LOW (ref 10–40)
BASOPHILS # BLD AUTO: 0.08 K/UL — SIGNIFICANT CHANGE UP (ref 0–0.2)
BASOPHILS NFR BLD AUTO: 0.3 % — SIGNIFICANT CHANGE UP (ref 0–2)
BILIRUB SERPL-MCNC: 0.3 MG/DL — SIGNIFICANT CHANGE UP (ref 0.2–1.2)
BUN SERPL-MCNC: 9 MG/DL — SIGNIFICANT CHANGE UP (ref 7–23)
CALCIUM SERPL-MCNC: 8.8 MG/DL — SIGNIFICANT CHANGE UP (ref 8.4–10.5)
CANCER AG19-9 SERPL-ACNC: 28 U/ML — SIGNIFICANT CHANGE UP
CEA SERPL-MCNC: 4.4 NG/ML — HIGH (ref 0–3.8)
CHLORIDE SERPL-SCNC: 97 MMOL/L — SIGNIFICANT CHANGE UP (ref 96–108)
CO2 SERPL-SCNC: 27 MMOL/L — SIGNIFICANT CHANGE UP (ref 22–31)
CREAT SERPL-MCNC: 0.57 MG/DL — SIGNIFICANT CHANGE UP (ref 0.5–1.3)
EGFR: 101 ML/MIN/1.73M2 — SIGNIFICANT CHANGE UP
EOSINOPHIL # BLD AUTO: 0.1 K/UL — SIGNIFICANT CHANGE UP (ref 0–0.5)
EOSINOPHIL NFR BLD AUTO: 0.4 % — SIGNIFICANT CHANGE UP (ref 0–6)
GLUCOSE SERPL-MCNC: 111 MG/DL — HIGH (ref 70–99)
HCT VFR BLD CALC: 28.7 % — LOW (ref 34.5–45)
HGB BLD-MCNC: 9.6 G/DL — LOW (ref 11.5–15.5)
IMM GRANULOCYTES NFR BLD AUTO: 2 % — HIGH (ref 0–0.9)
LYMPHOCYTES # BLD AUTO: 1.8 K/UL — SIGNIFICANT CHANGE UP (ref 1–3.3)
LYMPHOCYTES # BLD AUTO: 7.8 % — LOW (ref 13–44)
MCHC RBC-ENTMCNC: 30.9 PG — SIGNIFICANT CHANGE UP (ref 27–34)
MCHC RBC-ENTMCNC: 33.4 GM/DL — SIGNIFICANT CHANGE UP (ref 32–36)
MCV RBC AUTO: 92.3 FL — SIGNIFICANT CHANGE UP (ref 80–100)
MONOCYTES # BLD AUTO: 0.6 K/UL — SIGNIFICANT CHANGE UP (ref 0–0.9)
MONOCYTES NFR BLD AUTO: 2.6 % — SIGNIFICANT CHANGE UP (ref 2–14)
NEUTROPHILS # BLD AUTO: 19.96 K/UL — HIGH (ref 1.8–7.4)
NEUTROPHILS NFR BLD AUTO: 86.9 % — HIGH (ref 43–77)
NRBC # BLD: 0 /100 WBCS — SIGNIFICANT CHANGE UP (ref 0–0)
PLATELET # BLD AUTO: 219 K/UL — SIGNIFICANT CHANGE UP (ref 150–400)
POTASSIUM SERPL-MCNC: 2.8 MMOL/L — CRITICAL LOW (ref 3.5–5.3)
POTASSIUM SERPL-SCNC: 2.8 MMOL/L — CRITICAL LOW (ref 3.5–5.3)
PROT SERPL-MCNC: 5.9 G/DL — LOW (ref 6–8.3)
RBC # BLD: 3.11 M/UL — LOW (ref 3.8–5.2)
RBC # FLD: 16.7 % — HIGH (ref 10.3–14.5)
SODIUM SERPL-SCNC: 135 MMOL/L — SIGNIFICANT CHANGE UP (ref 135–145)
WBC # BLD: 22.99 K/UL — HIGH (ref 3.8–10.5)
WBC # FLD AUTO: 22.99 K/UL — HIGH (ref 3.8–10.5)

## 2022-12-13 PROCEDURE — 99213 OFFICE O/P EST LOW 20 MIN: CPT

## 2022-12-15 ENCOUNTER — APPOINTMENT (OUTPATIENT)
Age: 62
End: 2022-12-15

## 2022-12-15 ENCOUNTER — NON-APPOINTMENT (OUTPATIENT)
Age: 62
End: 2022-12-15

## 2022-12-15 PROCEDURE — 96367 TX/PROPH/DG ADDL SEQ IV INF: CPT

## 2022-12-15 PROCEDURE — 36415 COLL VENOUS BLD VENIPUNCTURE: CPT

## 2022-12-15 PROCEDURE — 96375 TX/PRO/DX INJ NEW DRUG ADDON: CPT

## 2022-12-15 PROCEDURE — 83735 ASSAY OF MAGNESIUM: CPT

## 2022-12-15 PROCEDURE — 96416 CHEMO PROLONG INFUSE W/PUMP: CPT

## 2022-12-15 PROCEDURE — 80053 COMPREHEN METABOLIC PANEL: CPT

## 2022-12-15 PROCEDURE — 84132 ASSAY OF SERUM POTASSIUM: CPT

## 2022-12-15 PROCEDURE — 85027 COMPLETE CBC AUTOMATED: CPT

## 2022-12-15 PROCEDURE — 96413 CHEMO IV INFUSION 1 HR: CPT

## 2022-12-15 PROCEDURE — 96377 APPLICATON ON-BODY INJECTOR: CPT

## 2022-12-15 PROCEDURE — 96415 CHEMO IV INFUSION ADDL HR: CPT

## 2022-12-15 PROCEDURE — 96368 THER/DIAG CONCURRENT INF: CPT

## 2022-12-15 PROCEDURE — 96417 CHEMO IV INFUS EACH ADDL SEQ: CPT

## 2022-12-15 PROCEDURE — 96365 THER/PROPH/DIAG IV INF INIT: CPT

## 2022-12-15 PROCEDURE — 82378 CARCINOEMBRYONIC ANTIGEN: CPT

## 2022-12-15 PROCEDURE — 86301 IMMUNOASSAY TUMOR CA 19-9: CPT

## 2022-12-19 ENCOUNTER — OUTPATIENT (OUTPATIENT)
Dept: OUTPATIENT SERVICES | Facility: HOSPITAL | Age: 62
LOS: 1 days | End: 2022-12-19
Payer: COMMERCIAL

## 2022-12-19 DIAGNOSIS — Z51.89 ENCOUNTER FOR OTHER SPECIFIED AFTERCARE: ICD-10-CM

## 2022-12-19 DIAGNOSIS — C25.9 MALIGNANT NEOPLASM OF PANCREAS, UNSPECIFIED: ICD-10-CM

## 2022-12-19 DIAGNOSIS — R11.2 NAUSEA WITH VOMITING, UNSPECIFIED: ICD-10-CM

## 2022-12-27 ENCOUNTER — RESULT REVIEW (OUTPATIENT)
Age: 62
End: 2022-12-27

## 2022-12-27 ENCOUNTER — APPOINTMENT (OUTPATIENT)
Age: 62
End: 2022-12-27

## 2022-12-27 LAB
ALBUMIN SERPL ELPH-MCNC: 3.7 G/DL — SIGNIFICANT CHANGE UP (ref 3.3–5)
ALP SERPL-CCNC: 233 U/L — HIGH (ref 40–120)
ALT FLD-CCNC: 9 U/L — LOW (ref 10–45)
ANION GAP SERPL CALC-SCNC: 14 MMOL/L — SIGNIFICANT CHANGE UP (ref 5–17)
ANISOCYTOSIS BLD QL: SLIGHT — SIGNIFICANT CHANGE UP
AST SERPL-CCNC: 11 U/L — SIGNIFICANT CHANGE UP (ref 10–40)
BASOPHILS # BLD AUTO: 0 K/UL — SIGNIFICANT CHANGE UP (ref 0–0.2)
BASOPHILS NFR BLD AUTO: 0 % — SIGNIFICANT CHANGE UP (ref 0–2)
BILIRUB SERPL-MCNC: 0.2 MG/DL — SIGNIFICANT CHANGE UP (ref 0.2–1.2)
BUN SERPL-MCNC: 9 MG/DL — SIGNIFICANT CHANGE UP (ref 7–23)
CALCIUM SERPL-MCNC: 8.9 MG/DL — SIGNIFICANT CHANGE UP (ref 8.4–10.5)
CHLORIDE SERPL-SCNC: 97 MMOL/L — SIGNIFICANT CHANGE UP (ref 96–108)
CO2 SERPL-SCNC: 29 MMOL/L — SIGNIFICANT CHANGE UP (ref 22–31)
CREAT SERPL-MCNC: 0.64 MG/DL — SIGNIFICANT CHANGE UP (ref 0.5–1.3)
EGFR: 99 ML/MIN/1.73M2 — SIGNIFICANT CHANGE UP
EOSINOPHIL # BLD AUTO: 0.3 K/UL — SIGNIFICANT CHANGE UP (ref 0–0.5)
EOSINOPHIL NFR BLD AUTO: 1 % — SIGNIFICANT CHANGE UP (ref 0–6)
GLUCOSE SERPL-MCNC: 99 MG/DL — SIGNIFICANT CHANGE UP (ref 70–99)
HCT VFR BLD CALC: 31.3 % — LOW (ref 34.5–45)
HGB BLD-MCNC: 10.2 G/DL — LOW (ref 11.5–15.5)
LYMPHOCYTES # BLD AUTO: 1.5 K/UL — SIGNIFICANT CHANGE UP (ref 1–3.3)
LYMPHOCYTES # BLD AUTO: 5 % — LOW (ref 13–44)
MCHC RBC-ENTMCNC: 30.4 PG — SIGNIFICANT CHANGE UP (ref 27–34)
MCHC RBC-ENTMCNC: 32.6 GM/DL — SIGNIFICANT CHANGE UP (ref 32–36)
MCV RBC AUTO: 93.4 FL — SIGNIFICANT CHANGE UP (ref 80–100)
MONOCYTES # BLD AUTO: 1.8 K/UL — HIGH (ref 0–0.9)
MONOCYTES NFR BLD AUTO: 6 % — SIGNIFICANT CHANGE UP (ref 2–14)
MYELOCYTES NFR BLD: 1 % — HIGH (ref 0–0)
NEUTROPHILS # BLD AUTO: 26.07 K/UL — HIGH (ref 1.8–7.4)
NEUTROPHILS NFR BLD AUTO: 87 % — HIGH (ref 43–77)
NRBC # BLD: 0 /100 — SIGNIFICANT CHANGE UP (ref 0–0)
NRBC # BLD: SIGNIFICANT CHANGE UP /100 WBCS (ref 0–0)
PLAT MORPH BLD: NORMAL — SIGNIFICANT CHANGE UP
PLATELET # BLD AUTO: 213 K/UL — SIGNIFICANT CHANGE UP (ref 150–400)
POIKILOCYTOSIS BLD QL AUTO: SLIGHT — SIGNIFICANT CHANGE UP
POLYCHROMASIA BLD QL SMEAR: SLIGHT — SIGNIFICANT CHANGE UP
POTASSIUM SERPL-MCNC: 2.6 MMOL/L — CRITICAL LOW (ref 3.5–5.3)
POTASSIUM SERPL-SCNC: 2.6 MMOL/L — CRITICAL LOW (ref 3.5–5.3)
PROT SERPL-MCNC: 6.1 G/DL — SIGNIFICANT CHANGE UP (ref 6–8.3)
RBC # BLD: 3.35 M/UL — LOW (ref 3.8–5.2)
RBC # FLD: 17.7 % — HIGH (ref 10.3–14.5)
RBC BLD AUTO: SIGNIFICANT CHANGE UP
SODIUM SERPL-SCNC: 140 MMOL/L — SIGNIFICANT CHANGE UP (ref 135–145)
WBC # BLD: 29.96 K/UL — HIGH (ref 3.8–10.5)
WBC # FLD AUTO: 29.96 K/UL — HIGH (ref 3.8–10.5)

## 2022-12-29 ENCOUNTER — APPOINTMENT (OUTPATIENT)
Age: 62
End: 2022-12-29
Payer: COMMERCIAL

## 2022-12-29 ENCOUNTER — APPOINTMENT (OUTPATIENT)
Age: 62
End: 2022-12-29

## 2022-12-29 ENCOUNTER — APPOINTMENT (OUTPATIENT)
Dept: HEMATOLOGY ONCOLOGY | Facility: CLINIC | Age: 62
End: 2022-12-29

## 2022-12-29 ENCOUNTER — NON-APPOINTMENT (OUTPATIENT)
Age: 62
End: 2022-12-29

## 2022-12-29 VITALS
OXYGEN SATURATION: 96 % | WEIGHT: 89.4 LBS | SYSTOLIC BLOOD PRESSURE: 190 MMHG | DIASTOLIC BLOOD PRESSURE: 80 MMHG | HEIGHT: 64 IN | TEMPERATURE: 98.1 F | HEART RATE: 78 BPM | BODY MASS INDEX: 15.26 KG/M2

## 2022-12-29 PROCEDURE — 99213 OFFICE O/P EST LOW 20 MIN: CPT

## 2022-12-29 NOTE — RESULTS/DATA
[FreeTextEntry1] : Ms. Nagel is a pleasant 62 year-old woman with unresectable pancreatic cancer (at least T2), on FOLFIRINOX.

## 2022-12-29 NOTE — PHYSICAL EXAM
[Restricted in physically strenuous activity but ambulatory and able to carry out work of a light or sedentary nature] : Status 1- Restricted in physically strenuous activity but ambulatory and able to carry out work of a light or sedentary nature, e.g., light house work, office work [Thin] : thin [Normal] : affect appropriate [de-identified] : anicteric [de-identified] : right chest wall mediport c/d/i

## 2022-12-29 NOTE — REVIEW OF SYSTEMS
[Fatigue] : fatigue [Recent Change In Weight] : ~T no recent weight change [Dysphagia] : no dysphagia [Odynophagia] : no odynophagia [Chest Pain] : no chest pain [Shortness Of Breath] : no shortness of breath [Abdominal Pain] : no abdominal pain [Joint Pain] : no joint pain [Joint Stiffness] : no joint stiffness [Easy Bleeding] : no tendency for easy bleeding [Easy Bruising] : no tendency for easy bruising [Swollen Glands] : no swollen glands

## 2022-12-29 NOTE — HISTORY OF PRESENT ILLNESS
[de-identified] : Ms. Nagel presented to [facility] with jaundice at the end of September, 2022.  Total bilirubin was 17.  Abdominal ultrasound showed a pancreatic lesion and she was transferred to [facility].\par \par CT imaging revealed an infiltrative mass involving the inferior pancreatic head and third portion of the duodenum, concerning for pancreatic adenocarcinoma.  Tumor encased (360 degrees) the superior mesenteric artery.  There was also distal CBD obstruction by tumor.  This necessitated an ERCP for diagnosis, as well as stent placement.\par \par Pathology obtained with Dr. Frost from the pancreatic head revealed adenocarcinoma, proficient mismatch repair.  Morphologically, the tissue resembled that of intestinal origin, with focal presence of goblet cells.  Felt to be an intestinal subtype of pancreatic ductal adenocarcinoma.  She was seen by Dr. Tolentino, who deemed her disease unresectable.\par \par Before our first consultation, she was readmitted to [facility] for strokelike symptoms and melena.  She is found to have a hemoglobin of 7g due to intestinal bleeding that was likely due to the sphincterotomy performed at ERCP.  CT imaging and of the brain and associated vessels was not concerning for an acute ischemic event.  She was transfused red blood cells, placed on octreotide and PPI, with resolution of the bleeding. Hemoglobin at discharge was 8.9g. Bilirubin 3.7.\par \par Ludivina is a daily smoker, and decreasing her intake from over one pack to about half a pack per day as of our first outpatient visit.  She has history of moderate alcohol intake, has stopped drinking since diagnosis.\par  [de-identified] : Finishing C5 of FOLFIRINOX today. Diarrhea last week resolved w imodium. Fatigue is present, but not dose-limiting. Weight is not any lower, but she has not gained. Has discussed with nutrition team. \par \par Some cold dysesthesia, not a major issue.

## 2022-12-30 NOTE — PATIENT PROFILE ADULT - VISION (WITH CORRECTIVE LENSES IF THE PATIENT USUALLY WEARS THEM):
Include Location In Plan?: No Detail Level: Generalized Partially impaired: cannot see medication labels or newsprint, but can see obstacles in path, and the surrounding layout; can count fingers at arm's length

## 2023-01-01 ENCOUNTER — NON-APPOINTMENT (OUTPATIENT)
Age: 63
End: 2023-01-01

## 2023-01-01 ENCOUNTER — OUTPATIENT (OUTPATIENT)
Dept: OUTPATIENT SERVICES | Facility: HOSPITAL | Age: 63
LOS: 1 days | End: 2023-01-01
Payer: COMMERCIAL

## 2023-01-01 ENCOUNTER — RESULT REVIEW (OUTPATIENT)
Age: 63
End: 2023-01-01

## 2023-01-01 ENCOUNTER — TRANSCRIPTION ENCOUNTER (OUTPATIENT)
Age: 63
End: 2023-01-01

## 2023-01-01 ENCOUNTER — INPATIENT (INPATIENT)
Facility: HOSPITAL | Age: 63
LOS: 4 days | Discharge: HOME | End: 2023-09-26
Attending: SURGERY | Admitting: SURGERY
Payer: COMMERCIAL

## 2023-01-01 ENCOUNTER — APPOINTMENT (OUTPATIENT)
Age: 63
End: 2023-01-01

## 2023-01-01 ENCOUNTER — APPOINTMENT (OUTPATIENT)
Age: 63
End: 2023-01-01
Payer: COMMERCIAL

## 2023-01-01 ENCOUNTER — APPOINTMENT (OUTPATIENT)
Dept: SURGICAL ONCOLOGY | Facility: HOSPITAL | Age: 63
End: 2023-01-01

## 2023-01-01 ENCOUNTER — OUTPATIENT (OUTPATIENT)
Dept: OUTPATIENT SERVICES | Facility: HOSPITAL | Age: 63
LOS: 1 days | End: 2023-01-01

## 2023-01-01 ENCOUNTER — APPOINTMENT (OUTPATIENT)
Dept: SURGICAL ONCOLOGY | Facility: CLINIC | Age: 63
End: 2023-01-01
Payer: COMMERCIAL

## 2023-01-01 ENCOUNTER — APPOINTMENT (OUTPATIENT)
Dept: SURGICAL ONCOLOGY | Facility: CLINIC | Age: 63
End: 2023-01-01

## 2023-01-01 ENCOUNTER — APPOINTMENT (OUTPATIENT)
Dept: CT IMAGING | Facility: CLINIC | Age: 63
End: 2023-01-01
Payer: COMMERCIAL

## 2023-01-01 ENCOUNTER — INPATIENT (INPATIENT)
Facility: HOSPITAL | Age: 63
LOS: 4 days | Discharge: HOME HEALTH | DRG: 871 | End: 2023-10-25
Attending: SURGERY | Admitting: SURGERY
Payer: COMMERCIAL

## 2023-01-01 VITALS
HEART RATE: 68 BPM | HEIGHT: 64 IN | OXYGEN SATURATION: 92 % | DIASTOLIC BLOOD PRESSURE: 78 MMHG | BODY MASS INDEX: 18.68 KG/M2 | SYSTOLIC BLOOD PRESSURE: 201 MMHG | TEMPERATURE: 98.2 F | WEIGHT: 109.4 LBS

## 2023-01-01 VITALS
HEART RATE: 64 BPM | RESPIRATION RATE: 18 BRPM | TEMPERATURE: 98.3 F | SYSTOLIC BLOOD PRESSURE: 142 MMHG | WEIGHT: 93.48 LBS | BODY MASS INDEX: 16.04 KG/M2 | DIASTOLIC BLOOD PRESSURE: 61 MMHG

## 2023-01-01 VITALS
TEMPERATURE: 97.8 F | BODY MASS INDEX: 16.9 KG/M2 | RESPIRATION RATE: 14 BRPM | WEIGHT: 99 LBS | HEART RATE: 76 BPM | SYSTOLIC BLOOD PRESSURE: 119 MMHG | DIASTOLIC BLOOD PRESSURE: 64 MMHG | HEIGHT: 64 IN | OXYGEN SATURATION: 99 %

## 2023-01-01 VITALS
BODY MASS INDEX: 18.27 KG/M2 | HEIGHT: 64 IN | TEMPERATURE: 97.5 F | RESPIRATION RATE: 16 BRPM | OXYGEN SATURATION: 97 % | SYSTOLIC BLOOD PRESSURE: 161 MMHG | DIASTOLIC BLOOD PRESSURE: 75 MMHG | HEART RATE: 69 BPM | WEIGHT: 107 LBS

## 2023-01-01 VITALS
SYSTOLIC BLOOD PRESSURE: 192 MMHG | OXYGEN SATURATION: 96 % | TEMPERATURE: 98.3 F | WEIGHT: 110 LBS | BODY MASS INDEX: 18.78 KG/M2 | HEART RATE: 73 BPM | HEIGHT: 64 IN | DIASTOLIC BLOOD PRESSURE: 81 MMHG

## 2023-01-01 VITALS
DIASTOLIC BLOOD PRESSURE: 60 MMHG | OXYGEN SATURATION: 97 % | SYSTOLIC BLOOD PRESSURE: 149 MMHG | TEMPERATURE: 98 F | RESPIRATION RATE: 16 BRPM | HEART RATE: 89 BPM

## 2023-01-01 VITALS
DIASTOLIC BLOOD PRESSURE: 67 MMHG | SYSTOLIC BLOOD PRESSURE: 165 MMHG | WEIGHT: 101.41 LBS | BODY MASS INDEX: 17.41 KG/M2 | TEMPERATURE: 98.8 F | HEART RATE: 72 BPM

## 2023-01-01 VITALS
OXYGEN SATURATION: 100 % | HEART RATE: 62 BPM | HEIGHT: 64 IN | SYSTOLIC BLOOD PRESSURE: 188 MMHG | DIASTOLIC BLOOD PRESSURE: 69 MMHG | WEIGHT: 110.01 LBS | RESPIRATION RATE: 16 BRPM | TEMPERATURE: 98 F

## 2023-01-01 VITALS — HEART RATE: 87 BPM | DIASTOLIC BLOOD PRESSURE: 60 MMHG | SYSTOLIC BLOOD PRESSURE: 110 MMHG

## 2023-01-01 VITALS
OXYGEN SATURATION: 97 % | TEMPERATURE: 97.1 F | SYSTOLIC BLOOD PRESSURE: 172 MMHG | DIASTOLIC BLOOD PRESSURE: 69 MMHG | WEIGHT: 93.6 LBS | BODY MASS INDEX: 16.07 KG/M2 | HEART RATE: 80 BPM

## 2023-01-01 VITALS
DIASTOLIC BLOOD PRESSURE: 83 MMHG | OXYGEN SATURATION: 96 % | HEIGHT: 64 IN | RESPIRATION RATE: 14 BRPM | TEMPERATURE: 98 F | SYSTOLIC BLOOD PRESSURE: 176 MMHG | HEART RATE: 64 BPM | WEIGHT: 110.01 LBS

## 2023-01-01 VITALS
HEIGHT: 64 IN | HEART RATE: 75 BPM | OXYGEN SATURATION: 98 % | DIASTOLIC BLOOD PRESSURE: 60 MMHG | BODY MASS INDEX: 16.9 KG/M2 | SYSTOLIC BLOOD PRESSURE: 128 MMHG | WEIGHT: 99 LBS

## 2023-01-01 VITALS
OXYGEN SATURATION: 100 % | WEIGHT: 100.6 LBS | BODY MASS INDEX: 17.17 KG/M2 | DIASTOLIC BLOOD PRESSURE: 80 MMHG | SYSTOLIC BLOOD PRESSURE: 178 MMHG | TEMPERATURE: 97.9 F | HEIGHT: 64 IN | HEART RATE: 79 BPM

## 2023-01-01 VITALS — DIASTOLIC BLOOD PRESSURE: 90 MMHG | SYSTOLIC BLOOD PRESSURE: 190 MMHG

## 2023-01-01 VITALS
HEART RATE: 67 BPM | BODY MASS INDEX: 18.16 KG/M2 | SYSTOLIC BLOOD PRESSURE: 242 MMHG | TEMPERATURE: 98.4 F | WEIGHT: 106.4 LBS | OXYGEN SATURATION: 97 % | DIASTOLIC BLOOD PRESSURE: 101 MMHG | HEIGHT: 64 IN

## 2023-01-01 VITALS
TEMPERATURE: 98.1 F | OXYGEN SATURATION: 100 % | HEIGHT: 64 IN | SYSTOLIC BLOOD PRESSURE: 165 MMHG | HEART RATE: 71 BPM | DIASTOLIC BLOOD PRESSURE: 81 MMHG | BODY MASS INDEX: 18.57 KG/M2 | WEIGHT: 108.8 LBS

## 2023-01-01 VITALS — TEMPERATURE: 98 F

## 2023-01-01 DIAGNOSIS — C25.9 MALIGNANT NEOPLASM OF PANCREAS, UNSPECIFIED: ICD-10-CM

## 2023-01-01 DIAGNOSIS — Z87.81 PERSONAL HISTORY OF (HEALED) TRAUMATIC FRACTURE: Chronic | ICD-10-CM

## 2023-01-01 DIAGNOSIS — Z51.11 ENCOUNTER FOR ANTINEOPLASTIC CHEMOTHERAPY: ICD-10-CM

## 2023-01-01 DIAGNOSIS — Z98.890 OTHER SPECIFIED POSTPROCEDURAL STATES: Chronic | ICD-10-CM

## 2023-01-01 DIAGNOSIS — Z51.89 ENCOUNTER FOR OTHER SPECIFIED AFTERCARE: ICD-10-CM

## 2023-01-01 DIAGNOSIS — K86.9 DISEASE OF PANCREAS, UNSPECIFIED: ICD-10-CM

## 2023-01-01 DIAGNOSIS — A41.51 SEPSIS DUE TO ESCHERICHIA COLI [E. COLI]: ICD-10-CM

## 2023-01-01 DIAGNOSIS — Z90.410 ACQUIRED TOTAL ABSENCE OF PANCREAS: Chronic | ICD-10-CM

## 2023-01-01 DIAGNOSIS — D64.9 ANEMIA, UNSPECIFIED: ICD-10-CM

## 2023-01-01 DIAGNOSIS — K65.1 PERITONEAL ABSCESS: ICD-10-CM

## 2023-01-01 DIAGNOSIS — Z85.07 PERSONAL HISTORY OF MALIGNANT NEOPLASM OF PANCREAS: ICD-10-CM

## 2023-01-01 DIAGNOSIS — K52.9 NONINFECTIVE GASTROENTERITIS AND COLITIS, UNSPECIFIED: ICD-10-CM

## 2023-01-01 DIAGNOSIS — N39.0 URINARY TRACT INFECTION, SITE NOT SPECIFIED: ICD-10-CM

## 2023-01-01 DIAGNOSIS — R63.0 ANOREXIA: ICD-10-CM

## 2023-01-01 DIAGNOSIS — J43.9 EMPHYSEMA, UNSPECIFIED: ICD-10-CM

## 2023-01-01 DIAGNOSIS — R11.2 NAUSEA WITH VOMITING, UNSPECIFIED: ICD-10-CM

## 2023-01-01 DIAGNOSIS — D84.9 IMMUNODEFICIENCY, UNSPECIFIED: ICD-10-CM

## 2023-01-01 DIAGNOSIS — Z79.51 LONG TERM (CURRENT) USE OF INHALED STEROIDS: ICD-10-CM

## 2023-01-01 DIAGNOSIS — E87.6 HYPOKALEMIA: ICD-10-CM

## 2023-01-01 DIAGNOSIS — Z90.49 ACQUIRED ABSENCE OF OTHER SPECIFIED PARTS OF DIGESTIVE TRACT: ICD-10-CM

## 2023-01-01 DIAGNOSIS — I10 ESSENTIAL (PRIMARY) HYPERTENSION: ICD-10-CM

## 2023-01-01 DIAGNOSIS — D50.9 IRON DEFICIENCY ANEMIA, UNSPECIFIED: ICD-10-CM

## 2023-01-01 DIAGNOSIS — Z16.12 EXTENDED SPECTRUM BETA LACTAMASE (ESBL) RESISTANCE: ICD-10-CM

## 2023-01-01 DIAGNOSIS — Z92.21 PERSONAL HISTORY OF ANTINEOPLASTIC CHEMOTHERAPY: ICD-10-CM

## 2023-01-01 DIAGNOSIS — J30.2 OTHER SEASONAL ALLERGIC RHINITIS: ICD-10-CM

## 2023-01-01 DIAGNOSIS — Z92.3 PERSONAL HISTORY OF IRRADIATION: ICD-10-CM

## 2023-01-01 DIAGNOSIS — Z90.411 ACQUIRED PARTIAL ABSENCE OF PANCREAS: ICD-10-CM

## 2023-01-01 DIAGNOSIS — D69.6 THROMBOCYTOPENIA, UNSPECIFIED: ICD-10-CM

## 2023-01-01 DIAGNOSIS — E43 UNSPECIFIED SEVERE PROTEIN-CALORIE MALNUTRITION: ICD-10-CM

## 2023-01-01 DIAGNOSIS — K86.89 OTHER SPECIFIED DISEASES OF PANCREAS: ICD-10-CM

## 2023-01-01 DIAGNOSIS — R18.8 OTHER ASCITES: ICD-10-CM

## 2023-01-01 LAB
-  AMIKACIN: SIGNIFICANT CHANGE UP
-  AMOXICILLIN/CLAVULANIC ACID: SIGNIFICANT CHANGE UP
-  AMPICILLIN/SULBACTAM: SIGNIFICANT CHANGE UP
-  AMPICILLIN: SIGNIFICANT CHANGE UP
-  AZTREONAM: SIGNIFICANT CHANGE UP
-  CEFAZOLIN: SIGNIFICANT CHANGE UP
-  CEFEPIME: SIGNIFICANT CHANGE UP
-  CEFTRIAXONE: SIGNIFICANT CHANGE UP
-  CIPROFLOXACIN: SIGNIFICANT CHANGE UP
-  ERTAPENEM: SIGNIFICANT CHANGE UP
-  GENTAMICIN: SIGNIFICANT CHANGE UP
-  IMIPENEM: SIGNIFICANT CHANGE UP
-  LEVOFLOXACIN: SIGNIFICANT CHANGE UP
-  MEROPENEM: SIGNIFICANT CHANGE UP
-  PIPERACILLIN/TAZOBACTAM: SIGNIFICANT CHANGE UP
-  TOBRAMYCIN: SIGNIFICANT CHANGE UP
-  TRIMETHOPRIM/SULFAMETHOXAZOLE: SIGNIFICANT CHANGE UP
A1C WITH ESTIMATED AVERAGE GLUCOSE RESULT: 5.3 % — SIGNIFICANT CHANGE UP (ref 4–5.6)
ALBUMIN SERPL ELPH-MCNC: 1.4 G/DL — LOW (ref 3.3–5)
ALBUMIN SERPL ELPH-MCNC: 1.4 G/DL — LOW (ref 3.3–5)
ALBUMIN SERPL ELPH-MCNC: 1.5 G/DL — LOW (ref 3.3–5)
ALBUMIN SERPL ELPH-MCNC: 1.6 G/DL — LOW (ref 3.3–5)
ALBUMIN SERPL ELPH-MCNC: 1.6 G/DL — LOW (ref 3.3–5)
ALBUMIN SERPL ELPH-MCNC: 3.4 G/DL — SIGNIFICANT CHANGE UP (ref 3.3–5)
ALBUMIN SERPL ELPH-MCNC: 3.5 G/DL — SIGNIFICANT CHANGE UP (ref 3.3–5)
ALBUMIN SERPL ELPH-MCNC: 3.5 G/DL — SIGNIFICANT CHANGE UP (ref 3.3–5)
ALBUMIN SERPL ELPH-MCNC: 3.6 G/DL — SIGNIFICANT CHANGE UP (ref 3.3–5)
ALBUMIN SERPL ELPH-MCNC: 3.7 G/DL — SIGNIFICANT CHANGE UP (ref 3.3–5)
ALBUMIN SERPL ELPH-MCNC: 3.8 G/DL
ALBUMIN SERPL ELPH-MCNC: 3.8 G/DL — SIGNIFICANT CHANGE UP (ref 3.3–5)
ALBUMIN SERPL ELPH-MCNC: 3.9 G/DL — SIGNIFICANT CHANGE UP (ref 3.3–5)
ALBUMIN SERPL ELPH-MCNC: 4 G/DL — SIGNIFICANT CHANGE UP (ref 3.3–5)
ALBUMIN SERPL ELPH-MCNC: 4.1 G/DL — SIGNIFICANT CHANGE UP (ref 3.3–5)
ALBUMIN SERPL ELPH-MCNC: 4.1 G/DL — SIGNIFICANT CHANGE UP (ref 3.3–5)
ALBUMIN SERPL ELPH-MCNC: 4.4 G/DL — SIGNIFICANT CHANGE UP (ref 3.3–5)
ALP BLD-CCNC: 289 U/L
ALP SERPL-CCNC: 102 U/L — SIGNIFICANT CHANGE UP (ref 40–120)
ALP SERPL-CCNC: 133 U/L — HIGH (ref 40–120)
ALP SERPL-CCNC: 138 U/L — HIGH (ref 40–120)
ALP SERPL-CCNC: 138 U/L — HIGH (ref 40–120)
ALP SERPL-CCNC: 151 U/L — HIGH (ref 40–120)
ALP SERPL-CCNC: 151 U/L — HIGH (ref 40–120)
ALP SERPL-CCNC: 155 U/L — HIGH (ref 40–120)
ALP SERPL-CCNC: 171 U/L — HIGH (ref 40–120)
ALP SERPL-CCNC: 190 U/L — HIGH (ref 40–120)
ALP SERPL-CCNC: 190 U/L — HIGH (ref 40–120)
ALP SERPL-CCNC: 198 U/L — HIGH (ref 40–120)
ALP SERPL-CCNC: 230 U/L — HIGH (ref 40–120)
ALP SERPL-CCNC: 235 U/L — HIGH (ref 40–120)
ALP SERPL-CCNC: 85 U/L — SIGNIFICANT CHANGE UP (ref 40–120)
ALP SERPL-CCNC: 89 U/L — SIGNIFICANT CHANGE UP (ref 40–120)
ALP SERPL-CCNC: 98 U/L — SIGNIFICANT CHANGE UP (ref 40–120)
ALT FLD-CCNC: 13 U/L — SIGNIFICANT CHANGE UP (ref 10–45)
ALT FLD-CCNC: 16 U/L — SIGNIFICANT CHANGE UP (ref 12–78)
ALT FLD-CCNC: 17 U/L — SIGNIFICANT CHANGE UP (ref 10–45)
ALT FLD-CCNC: 178 U/L — HIGH (ref 4–33)
ALT FLD-CCNC: 18 U/L — SIGNIFICANT CHANGE UP (ref 10–45)
ALT FLD-CCNC: 19 U/L — SIGNIFICANT CHANGE UP (ref 12–78)
ALT FLD-CCNC: 19 U/L — SIGNIFICANT CHANGE UP (ref 12–78)
ALT FLD-CCNC: 196 U/L — HIGH (ref 4–33)
ALT FLD-CCNC: 20 U/L — SIGNIFICANT CHANGE UP (ref 10–45)
ALT FLD-CCNC: 20 U/L — SIGNIFICANT CHANGE UP (ref 12–78)
ALT FLD-CCNC: 20 U/L — SIGNIFICANT CHANGE UP (ref 12–78)
ALT FLD-CCNC: 21 U/L — SIGNIFICANT CHANGE UP (ref 10–45)
ALT FLD-CCNC: 36 U/L — HIGH (ref 4–33)
ALT FLD-CCNC: 89 U/L — HIGH (ref 4–33)
ALT SERPL-CCNC: 18 U/L
AMYLASE FLD-CCNC: 103 U/L — SIGNIFICANT CHANGE UP
AMYLASE FLD-CCNC: 181 U/L — SIGNIFICANT CHANGE UP
AMYLASE FLD-CCNC: 354 U/L — SIGNIFICANT CHANGE UP
AMYLASE FLD-CCNC: 379 U/L — SIGNIFICANT CHANGE UP
AMYLASE FLD-CCNC: 486 U/L — SIGNIFICANT CHANGE UP
AMYLASE FLD-CCNC: 63 U/L — SIGNIFICANT CHANGE UP
AMYLASE FLD-CCNC: 77 U/L — SIGNIFICANT CHANGE UP
AMYLASE P1 CFR SERPL: 25 U/L — SIGNIFICANT CHANGE UP (ref 25–125)
ANION GAP SERPL CALC-SCNC: 10 MMOL/L — SIGNIFICANT CHANGE UP (ref 5–17)
ANION GAP SERPL CALC-SCNC: 10 MMOL/L — SIGNIFICANT CHANGE UP (ref 5–17)
ANION GAP SERPL CALC-SCNC: 11 MMOL/L — SIGNIFICANT CHANGE UP (ref 7–14)
ANION GAP SERPL CALC-SCNC: 12 MMOL/L — SIGNIFICANT CHANGE UP (ref 5–17)
ANION GAP SERPL CALC-SCNC: 12 MMOL/L — SIGNIFICANT CHANGE UP (ref 5–17)
ANION GAP SERPL CALC-SCNC: 12 MMOL/L — SIGNIFICANT CHANGE UP (ref 7–14)
ANION GAP SERPL CALC-SCNC: 13 MMOL/L — SIGNIFICANT CHANGE UP (ref 5–17)
ANION GAP SERPL CALC-SCNC: 13 MMOL/L — SIGNIFICANT CHANGE UP (ref 7–14)
ANION GAP SERPL CALC-SCNC: 14 MMOL/L — SIGNIFICANT CHANGE UP (ref 5–17)
ANION GAP SERPL CALC-SCNC: 16 MMOL/L
ANION GAP SERPL CALC-SCNC: 16 MMOL/L — SIGNIFICANT CHANGE UP (ref 5–17)
ANION GAP SERPL CALC-SCNC: 17 MMOL/L — HIGH (ref 7–14)
ANION GAP SERPL CALC-SCNC: 17 MMOL/L — HIGH (ref 7–14)
ANION GAP SERPL CALC-SCNC: 4 MMOL/L — LOW (ref 5–17)
ANION GAP SERPL CALC-SCNC: 6 MMOL/L — SIGNIFICANT CHANGE UP (ref 5–17)
ANION GAP SERPL CALC-SCNC: 6 MMOL/L — SIGNIFICANT CHANGE UP (ref 5–17)
ANION GAP SERPL CALC-SCNC: 7 MMOL/L — SIGNIFICANT CHANGE UP (ref 5–17)
ANION GAP SERPL CALC-SCNC: 7 MMOL/L — SIGNIFICANT CHANGE UP (ref 5–17)
ANION GAP SERPL CALC-SCNC: 8 MMOL/L — SIGNIFICANT CHANGE UP (ref 5–17)
ANION GAP SERPL CALC-SCNC: 8 MMOL/L — SIGNIFICANT CHANGE UP (ref 5–17)
ANION GAP SERPL CALC-SCNC: 9 MMOL/L — SIGNIFICANT CHANGE UP (ref 7–14)
ANISOCYTOSIS BLD QL: SLIGHT — SIGNIFICANT CHANGE UP
ANISOCYTOSIS BLD QL: SLIGHT — SIGNIFICANT CHANGE UP
APPEARANCE UR: ABNORMAL
APPEARANCE UR: ABNORMAL
APTT BLD: 27.1 SEC — SIGNIFICANT CHANGE UP (ref 24.5–35.6)
APTT BLD: 27.6 SEC — SIGNIFICANT CHANGE UP (ref 24.5–35.6)
APTT BLD: 29.3 SEC — SIGNIFICANT CHANGE UP (ref 24.5–35.6)
APTT BLD: 29.3 SEC — SIGNIFICANT CHANGE UP (ref 24.5–35.6)
APTT BLD: 30.7 SEC — SIGNIFICANT CHANGE UP (ref 24.5–35.6)
APTT BLD: 35 SEC — SIGNIFICANT CHANGE UP (ref 24.5–35.6)
AST SERPL-CCNC: 10 U/L — SIGNIFICANT CHANGE UP (ref 10–40)
AST SERPL-CCNC: 11 U/L — SIGNIFICANT CHANGE UP (ref 4–32)
AST SERPL-CCNC: 12 U/L — SIGNIFICANT CHANGE UP (ref 10–40)
AST SERPL-CCNC: 14 U/L
AST SERPL-CCNC: 14 U/L — SIGNIFICANT CHANGE UP (ref 10–40)
AST SERPL-CCNC: 151 U/L — HIGH (ref 4–32)
AST SERPL-CCNC: 16 U/L — SIGNIFICANT CHANGE UP (ref 10–40)
AST SERPL-CCNC: 20 U/L — SIGNIFICANT CHANGE UP (ref 10–40)
AST SERPL-CCNC: 21 U/L — SIGNIFICANT CHANGE UP (ref 10–40)
AST SERPL-CCNC: 21 U/L — SIGNIFICANT CHANGE UP (ref 10–40)
AST SERPL-CCNC: 22 U/L — SIGNIFICANT CHANGE UP (ref 15–37)
AST SERPL-CCNC: 22 U/L — SIGNIFICANT CHANGE UP (ref 15–37)
AST SERPL-CCNC: 25 U/L — SIGNIFICANT CHANGE UP (ref 15–37)
AST SERPL-CCNC: 25 U/L — SIGNIFICANT CHANGE UP (ref 15–37)
AST SERPL-CCNC: 26 U/L — SIGNIFICANT CHANGE UP (ref 15–37)
AST SERPL-CCNC: 273 U/L — HIGH (ref 4–32)
AST SERPL-CCNC: 42 U/L — HIGH (ref 4–32)
BASOPHILS # BLD AUTO: 0 K/UL — SIGNIFICANT CHANGE UP (ref 0–0.2)
BASOPHILS # BLD AUTO: 0 K/UL — SIGNIFICANT CHANGE UP (ref 0–0.2)
BASOPHILS # BLD AUTO: 0.02 K/UL — SIGNIFICANT CHANGE UP (ref 0–0.2)
BASOPHILS # BLD AUTO: 0.03 K/UL — SIGNIFICANT CHANGE UP (ref 0–0.2)
BASOPHILS # BLD AUTO: 0.03 K/UL — SIGNIFICANT CHANGE UP (ref 0–0.2)
BASOPHILS # BLD AUTO: 0.04 K/UL — SIGNIFICANT CHANGE UP (ref 0–0.2)
BASOPHILS # BLD AUTO: 0.05 K/UL
BASOPHILS # BLD AUTO: 0.05 K/UL — SIGNIFICANT CHANGE UP (ref 0–0.2)
BASOPHILS # BLD AUTO: 0.06 K/UL — SIGNIFICANT CHANGE UP (ref 0–0.2)
BASOPHILS NFR BLD AUTO: 0 % — SIGNIFICANT CHANGE UP (ref 0–2)
BASOPHILS NFR BLD AUTO: 0 % — SIGNIFICANT CHANGE UP (ref 0–2)
BASOPHILS NFR BLD AUTO: 0.3 % — SIGNIFICANT CHANGE UP (ref 0–2)
BASOPHILS NFR BLD AUTO: 0.4 %
BASOPHILS NFR BLD AUTO: 0.5 % — SIGNIFICANT CHANGE UP (ref 0–2)
BASOPHILS NFR BLD AUTO: 0.6 % — SIGNIFICANT CHANGE UP (ref 0–2)
BASOPHILS NFR BLD AUTO: 0.7 % — SIGNIFICANT CHANGE UP (ref 0–2)
BASOPHILS NFR BLD AUTO: 0.8 % — SIGNIFICANT CHANGE UP (ref 0–2)
BASOPHILS NFR BLD AUTO: 0.9 % — SIGNIFICANT CHANGE UP (ref 0–2)
BILIRUB DIRECT SERPL-MCNC: <0.2 MG/DL — SIGNIFICANT CHANGE UP (ref 0–0.3)
BILIRUB INDIRECT FLD-MCNC: >0.5 MG/DL — SIGNIFICANT CHANGE UP (ref 0–1)
BILIRUB SERPL-MCNC: 0.2 MG/DL — SIGNIFICANT CHANGE UP (ref 0.2–1.2)
BILIRUB SERPL-MCNC: 0.4 MG/DL — SIGNIFICANT CHANGE UP (ref 0.2–1.2)
BILIRUB SERPL-MCNC: 0.5 MG/DL
BILIRUB SERPL-MCNC: 0.5 MG/DL — SIGNIFICANT CHANGE UP (ref 0.2–1.2)
BILIRUB SERPL-MCNC: 0.7 MG/DL — SIGNIFICANT CHANGE UP (ref 0.2–1.2)
BILIRUB SERPL-MCNC: 0.8 MG/DL — SIGNIFICANT CHANGE UP (ref 0.2–1.2)
BILIRUB SERPL-MCNC: 0.9 MG/DL — SIGNIFICANT CHANGE UP (ref 0.2–1.2)
BILIRUB SERPL-MCNC: 1 MG/DL — SIGNIFICANT CHANGE UP (ref 0.2–1.2)
BILIRUB SERPL-MCNC: 1 MG/DL — SIGNIFICANT CHANGE UP (ref 0.2–1.2)
BILIRUB SERPL-MCNC: 1.7 MG/DL — HIGH (ref 0.2–1.2)
BILIRUB SERPL-MCNC: 1.7 MG/DL — HIGH (ref 0.2–1.2)
BILIRUB SERPL-MCNC: <0.2 MG/DL — SIGNIFICANT CHANGE UP (ref 0.2–1.2)
BILIRUB SERPL-MCNC: <0.2 MG/DL — SIGNIFICANT CHANGE UP (ref 0.2–1.2)
BILIRUB UR-MCNC: NEGATIVE — SIGNIFICANT CHANGE UP
BILIRUB UR-MCNC: NEGATIVE — SIGNIFICANT CHANGE UP
BLD GP AB SCN SERPL QL: NEGATIVE — SIGNIFICANT CHANGE UP
BUN SERPL-MCNC: 11 MG/DL — SIGNIFICANT CHANGE UP (ref 7–23)
BUN SERPL-MCNC: 13 MG/DL — SIGNIFICANT CHANGE UP (ref 7–23)
BUN SERPL-MCNC: 13 MG/DL — SIGNIFICANT CHANGE UP (ref 7–23)
BUN SERPL-MCNC: 16 MG/DL — SIGNIFICANT CHANGE UP (ref 7–23)
BUN SERPL-MCNC: 6 MG/DL — LOW (ref 7–23)
BUN SERPL-MCNC: 7 MG/DL — SIGNIFICANT CHANGE UP (ref 7–23)
BUN SERPL-MCNC: 8 MG/DL
BUN SERPL-MCNC: 8 MG/DL — SIGNIFICANT CHANGE UP (ref 7–23)
BUN SERPL-MCNC: 9 MG/DL — SIGNIFICANT CHANGE UP (ref 7–23)
CALCIUM SERPL-MCNC: 6.8 MG/DL — LOW (ref 8.5–10.1)
CALCIUM SERPL-MCNC: 6.8 MG/DL — LOW (ref 8.5–10.1)
CALCIUM SERPL-MCNC: 6.9 MG/DL — LOW (ref 8.5–10.1)
CALCIUM SERPL-MCNC: 6.9 MG/DL — LOW (ref 8.5–10.1)
CALCIUM SERPL-MCNC: 7.2 MG/DL — LOW (ref 8.5–10.1)
CALCIUM SERPL-MCNC: 7.2 MG/DL — LOW (ref 8.5–10.1)
CALCIUM SERPL-MCNC: 7.3 MG/DL — LOW (ref 8.5–10.1)
CALCIUM SERPL-MCNC: 7.3 MG/DL — LOW (ref 8.5–10.1)
CALCIUM SERPL-MCNC: 7.4 MG/DL — LOW (ref 8.5–10.1)
CALCIUM SERPL-MCNC: 7.4 MG/DL — LOW (ref 8.5–10.1)
CALCIUM SERPL-MCNC: 7.5 MG/DL — LOW (ref 8.5–10.1)
CALCIUM SERPL-MCNC: 7.5 MG/DL — LOW (ref 8.5–10.1)
CALCIUM SERPL-MCNC: 7.6 MG/DL — LOW (ref 8.5–10.1)
CALCIUM SERPL-MCNC: 7.6 MG/DL — LOW (ref 8.5–10.1)
CALCIUM SERPL-MCNC: 8.2 MG/DL — LOW (ref 8.4–10.5)
CALCIUM SERPL-MCNC: 8.5 MG/DL — SIGNIFICANT CHANGE UP (ref 8.4–10.5)
CALCIUM SERPL-MCNC: 8.6 MG/DL — SIGNIFICANT CHANGE UP (ref 8.4–10.5)
CALCIUM SERPL-MCNC: 8.7 MG/DL — SIGNIFICANT CHANGE UP (ref 8.4–10.5)
CALCIUM SERPL-MCNC: 8.7 MG/DL — SIGNIFICANT CHANGE UP (ref 8.4–10.5)
CALCIUM SERPL-MCNC: 8.8 MG/DL — SIGNIFICANT CHANGE UP (ref 8.4–10.5)
CALCIUM SERPL-MCNC: 8.9 MG/DL — SIGNIFICANT CHANGE UP (ref 8.4–10.5)
CALCIUM SERPL-MCNC: 9.1 MG/DL — SIGNIFICANT CHANGE UP (ref 8.4–10.5)
CALCIUM SERPL-MCNC: 9.2 MG/DL
CALCIUM SERPL-MCNC: 9.2 MG/DL — SIGNIFICANT CHANGE UP (ref 8.4–10.5)
CALCIUM SERPL-MCNC: 9.2 MG/DL — SIGNIFICANT CHANGE UP (ref 8.4–10.5)
CALCIUM SERPL-MCNC: 9.4 MG/DL — SIGNIFICANT CHANGE UP (ref 8.4–10.5)
CALCIUM SERPL-MCNC: 9.4 MG/DL — SIGNIFICANT CHANGE UP (ref 8.4–10.5)
CALCIUM SERPL-MCNC: 9.5 MG/DL — SIGNIFICANT CHANGE UP (ref 8.4–10.5)
CANCER AG19-9 SERPL-ACNC: 11 U/ML — SIGNIFICANT CHANGE UP
CANCER AG19-9 SERPL-ACNC: 13 U/ML — SIGNIFICANT CHANGE UP
CANCER AG19-9 SERPL-ACNC: 19 U/ML — SIGNIFICANT CHANGE UP
CANCER AG19-9 SERPL-ACNC: 8 U/ML
CEA SERPL-MCNC: 4.7 NG/ML — HIGH (ref 0–3.8)
CHLORIDE SERPL-SCNC: 100 MMOL/L — SIGNIFICANT CHANGE UP (ref 98–107)
CHLORIDE SERPL-SCNC: 100 MMOL/L — SIGNIFICANT CHANGE UP (ref 98–107)
CHLORIDE SERPL-SCNC: 103 MMOL/L — SIGNIFICANT CHANGE UP (ref 96–108)
CHLORIDE SERPL-SCNC: 103 MMOL/L — SIGNIFICANT CHANGE UP (ref 96–108)
CHLORIDE SERPL-SCNC: 104 MMOL/L — SIGNIFICANT CHANGE UP (ref 96–108)
CHLORIDE SERPL-SCNC: 105 MMOL/L — SIGNIFICANT CHANGE UP (ref 96–108)
CHLORIDE SERPL-SCNC: 106 MMOL/L — SIGNIFICANT CHANGE UP (ref 96–108)
CHLORIDE SERPL-SCNC: 106 MMOL/L — SIGNIFICANT CHANGE UP (ref 98–107)
CHLORIDE SERPL-SCNC: 108 MMOL/L — SIGNIFICANT CHANGE UP (ref 96–108)
CHLORIDE SERPL-SCNC: 92 MMOL/L
CHLORIDE SERPL-SCNC: 99 MMOL/L — SIGNIFICANT CHANGE UP (ref 98–107)
CO2 SERPL-SCNC: 22 MMOL/L — SIGNIFICANT CHANGE UP (ref 22–31)
CO2 SERPL-SCNC: 23 MMOL/L — SIGNIFICANT CHANGE UP (ref 22–31)
CO2 SERPL-SCNC: 24 MMOL/L — SIGNIFICANT CHANGE UP (ref 22–31)
CO2 SERPL-SCNC: 25 MMOL/L — SIGNIFICANT CHANGE UP (ref 22–31)
CO2 SERPL-SCNC: 26 MMOL/L — SIGNIFICANT CHANGE UP (ref 22–31)
CO2 SERPL-SCNC: 27 MMOL/L — SIGNIFICANT CHANGE UP (ref 22–31)
CO2 SERPL-SCNC: 28 MMOL/L
CO2 SERPL-SCNC: 28 MMOL/L — SIGNIFICANT CHANGE UP (ref 22–31)
CO2 SERPL-SCNC: 29 MMOL/L — SIGNIFICANT CHANGE UP (ref 22–31)
CO2 SERPL-SCNC: 29 MMOL/L — SIGNIFICANT CHANGE UP (ref 22–31)
COLOR SPEC: YELLOW — SIGNIFICANT CHANGE UP
COLOR SPEC: YELLOW — SIGNIFICANT CHANGE UP
CREAT SERPL-MCNC: 0.33 MG/DL — LOW (ref 0.5–1.3)
CREAT SERPL-MCNC: 0.33 MG/DL — LOW (ref 0.5–1.3)
CREAT SERPL-MCNC: 0.35 MG/DL — LOW (ref 0.5–1.3)
CREAT SERPL-MCNC: 0.35 MG/DL — LOW (ref 0.5–1.3)
CREAT SERPL-MCNC: 0.37 MG/DL — LOW (ref 0.5–1.3)
CREAT SERPL-MCNC: 0.41 MG/DL — LOW (ref 0.5–1.3)
CREAT SERPL-MCNC: 0.41 MG/DL — LOW (ref 0.5–1.3)
CREAT SERPL-MCNC: 0.45 MG/DL — LOW (ref 0.5–1.3)
CREAT SERPL-MCNC: 0.45 MG/DL — LOW (ref 0.5–1.3)
CREAT SERPL-MCNC: 0.46 MG/DL — LOW (ref 0.5–1.3)
CREAT SERPL-MCNC: 0.51 MG/DL
CREAT SERPL-MCNC: 0.52 MG/DL — SIGNIFICANT CHANGE UP (ref 0.5–1.3)
CREAT SERPL-MCNC: 0.52 MG/DL — SIGNIFICANT CHANGE UP (ref 0.5–1.3)
CREAT SERPL-MCNC: 0.54 MG/DL — SIGNIFICANT CHANGE UP (ref 0.5–1.3)
CREAT SERPL-MCNC: 0.54 MG/DL — SIGNIFICANT CHANGE UP (ref 0.5–1.3)
CREAT SERPL-MCNC: 0.55 MG/DL — SIGNIFICANT CHANGE UP (ref 0.5–1.3)
CREAT SERPL-MCNC: 0.57 MG/DL — SIGNIFICANT CHANGE UP (ref 0.5–1.3)
CREAT SERPL-MCNC: 0.57 MG/DL — SIGNIFICANT CHANGE UP (ref 0.5–1.3)
CREAT SERPL-MCNC: 0.6 MG/DL — SIGNIFICANT CHANGE UP (ref 0.5–1.3)
CREAT SERPL-MCNC: 0.61 MG/DL — SIGNIFICANT CHANGE UP (ref 0.5–1.3)
CREAT SERPL-MCNC: 0.61 MG/DL — SIGNIFICANT CHANGE UP (ref 0.5–1.3)
CREAT SERPL-MCNC: 0.65 MG/DL — SIGNIFICANT CHANGE UP (ref 0.5–1.3)
CREAT SERPL-MCNC: 0.65 MG/DL — SIGNIFICANT CHANGE UP (ref 0.5–1.3)
CULTURE RESULTS: SIGNIFICANT CHANGE UP
D DIMER BLD IA.RAPID-MCNC: 1297 NG/ML DDU — HIGH
D DIMER BLD IA.RAPID-MCNC: 1297 NG/ML DDU — HIGH
DACRYOCYTES BLD QL SMEAR: SLIGHT — SIGNIFICANT CHANGE UP
DIFF PNL FLD: ABNORMAL
DIFF PNL FLD: ABNORMAL
EGFR: 100 ML/MIN/1.73M2 — SIGNIFICANT CHANGE UP
EGFR: 101 ML/MIN/1.73M2 — SIGNIFICANT CHANGE UP
EGFR: 101 ML/MIN/1.73M2 — SIGNIFICANT CHANGE UP
EGFR: 102 ML/MIN/1.73M2 — SIGNIFICANT CHANGE UP
EGFR: 103 ML/MIN/1.73M2 — SIGNIFICANT CHANGE UP
EGFR: 103 ML/MIN/1.73M2 — SIGNIFICANT CHANGE UP
EGFR: 104 ML/MIN/1.73M2
EGFR: 104 ML/MIN/1.73M2 — SIGNIFICANT CHANGE UP
EGFR: 104 ML/MIN/1.73M2 — SIGNIFICANT CHANGE UP
EGFR: 107 ML/MIN/1.73M2 — SIGNIFICANT CHANGE UP
EGFR: 110 ML/MIN/1.73M2 — SIGNIFICANT CHANGE UP
EGFR: 110 ML/MIN/1.73M2 — SIGNIFICANT CHANGE UP
EGFR: 113 ML/MIN/1.73M2 — SIGNIFICANT CHANGE UP
EGFR: 114 ML/MIN/1.73M2 — SIGNIFICANT CHANGE UP
EGFR: 114 ML/MIN/1.73M2 — SIGNIFICANT CHANGE UP
EGFR: 116 ML/MIN/1.73M2 — SIGNIFICANT CHANGE UP
EGFR: 116 ML/MIN/1.73M2 — SIGNIFICANT CHANGE UP
EGFR: 98 ML/MIN/1.73M2 — SIGNIFICANT CHANGE UP
EGFR: 98 ML/MIN/1.73M2 — SIGNIFICANT CHANGE UP
EOSINOPHIL # BLD AUTO: 0.06 K/UL
EOSINOPHIL # BLD AUTO: 0.07 K/UL — SIGNIFICANT CHANGE UP (ref 0–0.5)
EOSINOPHIL # BLD AUTO: 0.1 K/UL — SIGNIFICANT CHANGE UP (ref 0–0.5)
EOSINOPHIL # BLD AUTO: 0.12 K/UL — SIGNIFICANT CHANGE UP (ref 0–0.5)
EOSINOPHIL # BLD AUTO: 0.19 K/UL — SIGNIFICANT CHANGE UP (ref 0–0.5)
EOSINOPHIL # BLD AUTO: 0.2 K/UL — SIGNIFICANT CHANGE UP (ref 0–0.5)
EOSINOPHIL # BLD AUTO: 0.2 K/UL — SIGNIFICANT CHANGE UP (ref 0–0.5)
EOSINOPHIL # BLD AUTO: 0.22 K/UL — SIGNIFICANT CHANGE UP (ref 0–0.5)
EOSINOPHIL # BLD AUTO: 0.23 K/UL — SIGNIFICANT CHANGE UP (ref 0–0.5)
EOSINOPHIL # BLD AUTO: 0.26 K/UL — SIGNIFICANT CHANGE UP (ref 0–0.5)
EOSINOPHIL # BLD AUTO: 0.75 K/UL — HIGH (ref 0–0.5)
EOSINOPHIL NFR BLD AUTO: 0.5 %
EOSINOPHIL NFR BLD AUTO: 0.6 % — SIGNIFICANT CHANGE UP (ref 0–6)
EOSINOPHIL NFR BLD AUTO: 1 % — SIGNIFICANT CHANGE UP (ref 0–6)
EOSINOPHIL NFR BLD AUTO: 2.3 % — SIGNIFICANT CHANGE UP (ref 0–6)
EOSINOPHIL NFR BLD AUTO: 2.4 % — SIGNIFICANT CHANGE UP (ref 0–6)
EOSINOPHIL NFR BLD AUTO: 3 % — SIGNIFICANT CHANGE UP (ref 0–6)
EOSINOPHIL NFR BLD AUTO: 3.2 % — SIGNIFICANT CHANGE UP (ref 0–6)
EOSINOPHIL NFR BLD AUTO: 4.7 % — SIGNIFICANT CHANGE UP (ref 0–6)
ESTIMATED AVERAGE GLUCOSE: 105 — SIGNIFICANT CHANGE UP
FERRITIN SERPL-MCNC: 34 NG/ML — SIGNIFICANT CHANGE UP (ref 15–150)
FERRITIN SERPL-MCNC: 947 NG/ML — HIGH (ref 13–330)
FERRITIN SERPL-MCNC: 947 NG/ML — HIGH (ref 13–330)
FIBRINOGEN AG PPP IA-MCNC: 290 MG/DL — SIGNIFICANT CHANGE UP (ref 233–496)
FIBRINOGEN AG PPP IA-MCNC: 290 MG/DL — SIGNIFICANT CHANGE UP (ref 233–496)
GLUCOSE BLDC GLUCOMTR-MCNC: 102 MG/DL — HIGH (ref 70–99)
GLUCOSE BLDC GLUCOMTR-MCNC: 110 MG/DL — HIGH (ref 70–99)
GLUCOSE BLDC GLUCOMTR-MCNC: 118 MG/DL — HIGH (ref 70–99)
GLUCOSE BLDC GLUCOMTR-MCNC: 120 MG/DL — HIGH (ref 70–99)
GLUCOSE BLDC GLUCOMTR-MCNC: 123 MG/DL — HIGH (ref 70–99)
GLUCOSE BLDC GLUCOMTR-MCNC: 123 MG/DL — HIGH (ref 70–99)
GLUCOSE BLDC GLUCOMTR-MCNC: 126 MG/DL — HIGH (ref 70–99)
GLUCOSE BLDC GLUCOMTR-MCNC: 126 MG/DL — HIGH (ref 70–99)
GLUCOSE BLDC GLUCOMTR-MCNC: 128 MG/DL — HIGH (ref 70–99)
GLUCOSE BLDC GLUCOMTR-MCNC: 132 MG/DL — HIGH (ref 70–99)
GLUCOSE BLDC GLUCOMTR-MCNC: 133 MG/DL — HIGH (ref 70–99)
GLUCOSE BLDC GLUCOMTR-MCNC: 136 MG/DL — HIGH (ref 70–99)
GLUCOSE BLDC GLUCOMTR-MCNC: 142 MG/DL — HIGH (ref 70–99)
GLUCOSE BLDC GLUCOMTR-MCNC: 142 MG/DL — HIGH (ref 70–99)
GLUCOSE BLDC GLUCOMTR-MCNC: 143 MG/DL — HIGH (ref 70–99)
GLUCOSE BLDC GLUCOMTR-MCNC: 145 MG/DL — HIGH (ref 70–99)
GLUCOSE BLDC GLUCOMTR-MCNC: 152 MG/DL — HIGH (ref 70–99)
GLUCOSE BLDC GLUCOMTR-MCNC: 157 MG/DL — HIGH (ref 70–99)
GLUCOSE BLDC GLUCOMTR-MCNC: 93 MG/DL — SIGNIFICANT CHANGE UP (ref 70–99)
GLUCOSE BLDC GLUCOMTR-MCNC: 95 MG/DL — SIGNIFICANT CHANGE UP (ref 70–99)
GLUCOSE BLDC GLUCOMTR-MCNC: 97 MG/DL — SIGNIFICANT CHANGE UP (ref 70–99)
GLUCOSE SERPL-MCNC: 101 MG/DL — HIGH (ref 70–99)
GLUCOSE SERPL-MCNC: 101 MG/DL — HIGH (ref 70–99)
GLUCOSE SERPL-MCNC: 102 MG/DL — HIGH (ref 70–99)
GLUCOSE SERPL-MCNC: 102 MG/DL — HIGH (ref 70–99)
GLUCOSE SERPL-MCNC: 103 MG/DL — HIGH (ref 70–99)
GLUCOSE SERPL-MCNC: 104 MG/DL — HIGH (ref 70–99)
GLUCOSE SERPL-MCNC: 104 MG/DL — HIGH (ref 70–99)
GLUCOSE SERPL-MCNC: 107 MG/DL — HIGH (ref 70–99)
GLUCOSE SERPL-MCNC: 112 MG/DL — HIGH (ref 70–99)
GLUCOSE SERPL-MCNC: 112 MG/DL — HIGH (ref 70–99)
GLUCOSE SERPL-MCNC: 113 MG/DL — HIGH (ref 70–99)
GLUCOSE SERPL-MCNC: 113 MG/DL — HIGH (ref 70–99)
GLUCOSE SERPL-MCNC: 114 MG/DL — HIGH (ref 70–99)
GLUCOSE SERPL-MCNC: 118 MG/DL
GLUCOSE SERPL-MCNC: 122 MG/DL — HIGH (ref 70–99)
GLUCOSE SERPL-MCNC: 122 MG/DL — HIGH (ref 70–99)
GLUCOSE SERPL-MCNC: 130 MG/DL — HIGH (ref 70–99)
GLUCOSE SERPL-MCNC: 133 MG/DL — HIGH (ref 70–99)
GLUCOSE SERPL-MCNC: 133 MG/DL — HIGH (ref 70–99)
GLUCOSE SERPL-MCNC: 152 MG/DL — HIGH (ref 70–99)
GLUCOSE SERPL-MCNC: 156 MG/DL — HIGH (ref 70–99)
GLUCOSE SERPL-MCNC: 159 MG/DL — HIGH (ref 70–99)
GLUCOSE SERPL-MCNC: 195 MG/DL — HIGH (ref 70–99)
GLUCOSE SERPL-MCNC: 72 MG/DL — SIGNIFICANT CHANGE UP (ref 70–99)
GLUCOSE SERPL-MCNC: 81 MG/DL — SIGNIFICANT CHANGE UP (ref 70–99)
GLUCOSE SERPL-MCNC: 94 MG/DL — SIGNIFICANT CHANGE UP (ref 70–99)
GLUCOSE UR QL: NEGATIVE MG/DL — SIGNIFICANT CHANGE UP
GLUCOSE UR QL: NEGATIVE MG/DL — SIGNIFICANT CHANGE UP
GRAM STN FLD: SIGNIFICANT CHANGE UP
HCT VFR BLD CALC: 21.2 % — LOW (ref 34.5–45)
HCT VFR BLD CALC: 21.2 % — LOW (ref 34.5–45)
HCT VFR BLD CALC: 22.4 % — LOW (ref 34.5–45)
HCT VFR BLD CALC: 22.4 % — LOW (ref 34.5–45)
HCT VFR BLD CALC: 22.7 % — LOW (ref 34.5–45)
HCT VFR BLD CALC: 22.7 % — LOW (ref 34.5–45)
HCT VFR BLD CALC: 23.3 % — LOW (ref 34.5–45)
HCT VFR BLD CALC: 23.3 % — LOW (ref 34.5–45)
HCT VFR BLD CALC: 23.4 % — LOW (ref 34.5–45)
HCT VFR BLD CALC: 23.4 % — LOW (ref 34.5–45)
HCT VFR BLD CALC: 23.5 % — LOW (ref 34.5–45)
HCT VFR BLD CALC: 23.5 % — LOW (ref 34.5–45)
HCT VFR BLD CALC: 24.1 % — LOW (ref 34.5–45)
HCT VFR BLD CALC: 24.1 % — LOW (ref 34.5–45)
HCT VFR BLD CALC: 26.5 % — LOW (ref 34.5–45)
HCT VFR BLD CALC: 28.1 % — LOW (ref 34.5–45)
HCT VFR BLD CALC: 29.3 % — LOW (ref 34.5–45)
HCT VFR BLD CALC: 31.2 % — LOW (ref 34.5–45)
HCT VFR BLD CALC: 31.9 % — LOW (ref 34.5–45)
HCT VFR BLD CALC: 31.9 % — LOW (ref 34.5–45)
HCT VFR BLD CALC: 32 %
HCT VFR BLD CALC: 32.1 % — LOW (ref 34.5–45)
HCT VFR BLD CALC: 33.3 % — LOW (ref 34.5–45)
HCT VFR BLD CALC: 33.4 % — LOW (ref 34.5–45)
HCT VFR BLD CALC: 34.4 % — LOW (ref 34.5–45)
HCT VFR BLD CALC: 34.7 % — SIGNIFICANT CHANGE UP (ref 34.5–45)
HCT VFR BLD CALC: 35.1 % — SIGNIFICANT CHANGE UP (ref 34.5–45)
HCT VFR BLD CALC: 36.7 % — SIGNIFICANT CHANGE UP (ref 34.5–45)
HCT VFR BLD CALC: 39.3 % — SIGNIFICANT CHANGE UP (ref 34.5–45)
HCT VFR BLD CALC: 41.9 % — SIGNIFICANT CHANGE UP (ref 34.5–45)
HCT VFR BLD CALC: 42.1 % — SIGNIFICANT CHANGE UP (ref 34.5–45)
HGB BLD-MCNC: 10 G/DL
HGB BLD-MCNC: 10 G/DL — LOW (ref 11.5–15.5)
HGB BLD-MCNC: 10 G/DL — LOW (ref 11.5–15.5)
HGB BLD-MCNC: 10.5 G/DL — LOW (ref 11.5–15.5)
HGB BLD-MCNC: 10.9 G/DL — LOW (ref 11.5–15.5)
HGB BLD-MCNC: 11.3 G/DL — LOW (ref 11.5–15.5)
HGB BLD-MCNC: 11.4 G/DL — LOW (ref 11.5–15.5)
HGB BLD-MCNC: 11.5 G/DL — SIGNIFICANT CHANGE UP (ref 11.5–15.5)
HGB BLD-MCNC: 11.5 G/DL — SIGNIFICANT CHANGE UP (ref 11.5–15.5)
HGB BLD-MCNC: 11.6 G/DL — SIGNIFICANT CHANGE UP (ref 11.5–15.5)
HGB BLD-MCNC: 12.9 G/DL — SIGNIFICANT CHANGE UP (ref 11.5–15.5)
HGB BLD-MCNC: 13.4 G/DL — SIGNIFICANT CHANGE UP (ref 11.5–15.5)
HGB BLD-MCNC: 13.6 G/DL — SIGNIFICANT CHANGE UP (ref 11.5–15.5)
HGB BLD-MCNC: 7.2 G/DL — LOW (ref 11.5–15.5)
HGB BLD-MCNC: 7.2 G/DL — LOW (ref 11.5–15.5)
HGB BLD-MCNC: 7.4 G/DL — LOW (ref 11.5–15.5)
HGB BLD-MCNC: 7.4 G/DL — LOW (ref 11.5–15.5)
HGB BLD-MCNC: 7.6 G/DL — LOW (ref 11.5–15.5)
HGB BLD-MCNC: 7.6 G/DL — LOW (ref 11.5–15.5)
HGB BLD-MCNC: 7.8 G/DL — LOW (ref 11.5–15.5)
HGB BLD-MCNC: 7.8 G/DL — LOW (ref 11.5–15.5)
HGB BLD-MCNC: 7.9 G/DL — LOW (ref 11.5–15.5)
HGB BLD-MCNC: 7.9 G/DL — LOW (ref 11.5–15.5)
HGB BLD-MCNC: 8 G/DL — LOW (ref 11.5–15.5)
HGB BLD-MCNC: 8 G/DL — LOW (ref 11.5–15.5)
HGB BLD-MCNC: 8.1 G/DL — LOW (ref 11.5–15.5)
HGB BLD-MCNC: 8.1 G/DL — LOW (ref 11.5–15.5)
HGB BLD-MCNC: 8.4 G/DL — LOW (ref 11.5–15.5)
HGB BLD-MCNC: 8.9 G/DL — LOW (ref 11.5–15.5)
HGB BLD-MCNC: 9.7 G/DL — LOW (ref 11.5–15.5)
HGB BLD-MCNC: 9.7 G/DL — LOW (ref 11.5–15.5)
IMM GRANULOCYTES NFR BLD AUTO: 0.1 % — SIGNIFICANT CHANGE UP (ref 0–0.9)
IMM GRANULOCYTES NFR BLD AUTO: 0.1 % — SIGNIFICANT CHANGE UP (ref 0–0.9)
IMM GRANULOCYTES NFR BLD AUTO: 0.2 % — SIGNIFICANT CHANGE UP (ref 0–0.9)
IMM GRANULOCYTES NFR BLD AUTO: 0.4 % — SIGNIFICANT CHANGE UP (ref 0–0.9)
IMM GRANULOCYTES NFR BLD AUTO: 0.4 % — SIGNIFICANT CHANGE UP (ref 0–0.9)
IMM GRANULOCYTES NFR BLD AUTO: 0.5 % — SIGNIFICANT CHANGE UP (ref 0–0.9)
IMM GRANULOCYTES NFR BLD AUTO: 0.5 % — SIGNIFICANT CHANGE UP (ref 0–0.9)
IMM GRANULOCYTES NFR BLD AUTO: 0.6 %
IMM GRANULOCYTES NFR BLD AUTO: 1.3 % — HIGH (ref 0–0.9)
INR BLD: 0.92 RATIO — SIGNIFICANT CHANGE UP (ref 0.85–1.18)
INR BLD: 0.92 RATIO — SIGNIFICANT CHANGE UP (ref 0.85–1.18)
INR BLD: 1.1 RATIO — SIGNIFICANT CHANGE UP (ref 0.85–1.18)
INR BLD: 1.1 RATIO — SIGNIFICANT CHANGE UP (ref 0.85–1.18)
INR BLD: <0.9 RATIO — SIGNIFICANT CHANGE UP (ref 0.85–1.18)
INR BLD: <0.9 RATIO — SIGNIFICANT CHANGE UP (ref 0.85–1.18)
KETONES UR-MCNC: NEGATIVE MG/DL — SIGNIFICANT CHANGE UP
KETONES UR-MCNC: NEGATIVE MG/DL — SIGNIFICANT CHANGE UP
LEUKOCYTE ESTERASE UR-ACNC: ABNORMAL
LEUKOCYTE ESTERASE UR-ACNC: ABNORMAL
LYMPHOCYTES # BLD AUTO: 0.91 K/UL — LOW (ref 1–3.3)
LYMPHOCYTES # BLD AUTO: 0.92 K/UL — LOW (ref 1–3.3)
LYMPHOCYTES # BLD AUTO: 1.03 K/UL — SIGNIFICANT CHANGE UP (ref 1–3.3)
LYMPHOCYTES # BLD AUTO: 1.06 K/UL
LYMPHOCYTES # BLD AUTO: 1.18 K/UL — SIGNIFICANT CHANGE UP (ref 1–3.3)
LYMPHOCYTES # BLD AUTO: 1.29 K/UL — SIGNIFICANT CHANGE UP (ref 1–3.3)
LYMPHOCYTES # BLD AUTO: 1.49 K/UL — SIGNIFICANT CHANGE UP (ref 1–3.3)
LYMPHOCYTES # BLD AUTO: 1.5 K/UL — SIGNIFICANT CHANGE UP (ref 1–3.3)
LYMPHOCYTES # BLD AUTO: 1.73 K/UL — SIGNIFICANT CHANGE UP (ref 1–3.3)
LYMPHOCYTES # BLD AUTO: 1.74 K/UL — SIGNIFICANT CHANGE UP (ref 1–3.3)
LYMPHOCYTES # BLD AUTO: 1.8 K/UL — SIGNIFICANT CHANGE UP (ref 1–3.3)
LYMPHOCYTES # BLD AUTO: 18.8 % — SIGNIFICANT CHANGE UP (ref 13–44)
LYMPHOCYTES # BLD AUTO: 21.3 % — SIGNIFICANT CHANGE UP (ref 13–44)
LYMPHOCYTES # BLD AUTO: 21.4 % — SIGNIFICANT CHANGE UP (ref 13–44)
LYMPHOCYTES # BLD AUTO: 22.2 % — SIGNIFICANT CHANGE UP (ref 13–44)
LYMPHOCYTES # BLD AUTO: 22.6 % — SIGNIFICANT CHANGE UP (ref 13–44)
LYMPHOCYTES # BLD AUTO: 25.6 % — SIGNIFICANT CHANGE UP (ref 13–44)
LYMPHOCYTES # BLD AUTO: 25.8 % — SIGNIFICANT CHANGE UP (ref 13–44)
LYMPHOCYTES # BLD AUTO: 6 % — LOW (ref 13–44)
LYMPHOCYTES # BLD AUTO: 7.9 % — LOW (ref 13–44)
LYMPHOCYTES # BLD AUTO: 8 % — LOW (ref 13–44)
LYMPHOCYTES NFR BLD AUTO: 8.3 %
MACROCYTES BLD QL: SLIGHT — SIGNIFICANT CHANGE UP
MACROCYTES BLD QL: SLIGHT — SIGNIFICANT CHANGE UP
MAGNESIUM SERPL-MCNC: 1.6 MG/DL — SIGNIFICANT CHANGE UP (ref 1.6–2.6)
MAGNESIUM SERPL-MCNC: 1.6 MG/DL — SIGNIFICANT CHANGE UP (ref 1.6–2.6)
MAGNESIUM SERPL-MCNC: 1.8 MG/DL — SIGNIFICANT CHANGE UP (ref 1.6–2.6)
MAGNESIUM SERPL-MCNC: 1.9 MG/DL — SIGNIFICANT CHANGE UP (ref 1.6–2.6)
MAGNESIUM SERPL-MCNC: 2 MG/DL — SIGNIFICANT CHANGE UP (ref 1.6–2.6)
MAGNESIUM SERPL-MCNC: 2.1 MG/DL — SIGNIFICANT CHANGE UP (ref 1.6–2.6)
MAGNESIUM SERPL-MCNC: 2.2 MG/DL — SIGNIFICANT CHANGE UP (ref 1.6–2.6)
MAN DIFF?: NORMAL
MCHC RBC-ENTMCNC: 31 PG — SIGNIFICANT CHANGE UP (ref 27–34)
MCHC RBC-ENTMCNC: 31.1 GM/DL — LOW (ref 32–36)
MCHC RBC-ENTMCNC: 31.3 GM/DL
MCHC RBC-ENTMCNC: 31.3 GM/DL — LOW (ref 32–36)
MCHC RBC-ENTMCNC: 31.3 GM/DL — LOW (ref 32–36)
MCHC RBC-ENTMCNC: 31.3 PG — SIGNIFICANT CHANGE UP (ref 27–34)
MCHC RBC-ENTMCNC: 31.4 PG — SIGNIFICANT CHANGE UP (ref 27–34)
MCHC RBC-ENTMCNC: 31.6 GM/DL — LOW (ref 32–36)
MCHC RBC-ENTMCNC: 31.6 PG — SIGNIFICANT CHANGE UP (ref 27–34)
MCHC RBC-ENTMCNC: 31.7 GM/DL — LOW (ref 32–36)
MCHC RBC-ENTMCNC: 31.7 GM/DL — LOW (ref 32–36)
MCHC RBC-ENTMCNC: 31.8 PG — SIGNIFICANT CHANGE UP (ref 27–34)
MCHC RBC-ENTMCNC: 31.8 PG — SIGNIFICANT CHANGE UP (ref 27–34)
MCHC RBC-ENTMCNC: 31.9 PG — SIGNIFICANT CHANGE UP (ref 27–34)
MCHC RBC-ENTMCNC: 31.9 PG — SIGNIFICANT CHANGE UP (ref 27–34)
MCHC RBC-ENTMCNC: 32 GM/DL — SIGNIFICANT CHANGE UP (ref 32–36)
MCHC RBC-ENTMCNC: 32.1 PG — SIGNIFICANT CHANGE UP (ref 27–34)
MCHC RBC-ENTMCNC: 32.1 PG — SIGNIFICANT CHANGE UP (ref 27–34)
MCHC RBC-ENTMCNC: 32.2 PG — SIGNIFICANT CHANGE UP (ref 27–34)
MCHC RBC-ENTMCNC: 32.2 PG — SIGNIFICANT CHANGE UP (ref 27–34)
MCHC RBC-ENTMCNC: 32.3 GM/DL — SIGNIFICANT CHANGE UP (ref 32–36)
MCHC RBC-ENTMCNC: 32.3 PG — SIGNIFICANT CHANGE UP (ref 27–34)
MCHC RBC-ENTMCNC: 32.3 PG — SIGNIFICANT CHANGE UP (ref 27–34)
MCHC RBC-ENTMCNC: 32.4 PG — SIGNIFICANT CHANGE UP (ref 27–34)
MCHC RBC-ENTMCNC: 32.4 PG — SIGNIFICANT CHANGE UP (ref 27–34)
MCHC RBC-ENTMCNC: 32.5 PG — SIGNIFICANT CHANGE UP (ref 27–34)
MCHC RBC-ENTMCNC: 32.6 GM/DL — SIGNIFICANT CHANGE UP (ref 32–36)
MCHC RBC-ENTMCNC: 32.6 PG
MCHC RBC-ENTMCNC: 32.6 PG — SIGNIFICANT CHANGE UP (ref 27–34)
MCHC RBC-ENTMCNC: 32.7 GM/DL — SIGNIFICANT CHANGE UP (ref 32–36)
MCHC RBC-ENTMCNC: 32.7 GM/DL — SIGNIFICANT CHANGE UP (ref 32–36)
MCHC RBC-ENTMCNC: 32.8 GM/DL — SIGNIFICANT CHANGE UP (ref 32–36)
MCHC RBC-ENTMCNC: 32.8 GM/DL — SIGNIFICANT CHANGE UP (ref 32–36)
MCHC RBC-ENTMCNC: 32.8 PG — SIGNIFICANT CHANGE UP (ref 27–34)
MCHC RBC-ENTMCNC: 32.9 PG — SIGNIFICANT CHANGE UP (ref 27–34)
MCHC RBC-ENTMCNC: 32.9 PG — SIGNIFICANT CHANGE UP (ref 27–34)
MCHC RBC-ENTMCNC: 33 GM/DL — SIGNIFICANT CHANGE UP (ref 32–36)
MCHC RBC-ENTMCNC: 33 GM/DL — SIGNIFICANT CHANGE UP (ref 32–36)
MCHC RBC-ENTMCNC: 33 PG — SIGNIFICANT CHANGE UP (ref 27–34)
MCHC RBC-ENTMCNC: 33.1 GM/DL — SIGNIFICANT CHANGE UP (ref 32–36)
MCHC RBC-ENTMCNC: 33.2 GM/DL — SIGNIFICANT CHANGE UP (ref 32–36)
MCHC RBC-ENTMCNC: 33.2 GM/DL — SIGNIFICANT CHANGE UP (ref 32–36)
MCHC RBC-ENTMCNC: 33.2 PG — SIGNIFICANT CHANGE UP (ref 27–34)
MCHC RBC-ENTMCNC: 33.2 PG — SIGNIFICANT CHANGE UP (ref 27–34)
MCHC RBC-ENTMCNC: 33.4 GM/DL — SIGNIFICANT CHANGE UP (ref 32–36)
MCHC RBC-ENTMCNC: 33.6 PG — SIGNIFICANT CHANGE UP (ref 27–34)
MCHC RBC-ENTMCNC: 33.8 GM/DL — SIGNIFICANT CHANGE UP (ref 32–36)
MCHC RBC-ENTMCNC: 33.8 GM/DL — SIGNIFICANT CHANGE UP (ref 32–36)
MCHC RBC-ENTMCNC: 34 GM/DL — SIGNIFICANT CHANGE UP (ref 32–36)
MCHC RBC-ENTMCNC: 34 GM/DL — SIGNIFICANT CHANGE UP (ref 32–36)
MCHC RBC-ENTMCNC: 34.1 GM/DL — SIGNIFICANT CHANGE UP (ref 32–36)
MCHC RBC-ENTMCNC: 34.4 GM/DL — SIGNIFICANT CHANGE UP (ref 32–36)
MCHC RBC-ENTMCNC: 34.4 GM/DL — SIGNIFICANT CHANGE UP (ref 32–36)
MCHC RBC-ENTMCNC: 34.5 GM/DL — SIGNIFICANT CHANGE UP (ref 32–36)
MCHC RBC-ENTMCNC: 34.5 GM/DL — SIGNIFICANT CHANGE UP (ref 32–36)
MCV RBC AUTO: 101.8 FL — HIGH (ref 80–100)
MCV RBC AUTO: 103.5 FL — HIGH (ref 80–100)
MCV RBC AUTO: 103.6 FL — HIGH (ref 80–100)
MCV RBC AUTO: 104.2 FL
MCV RBC AUTO: 105.3 FL — HIGH (ref 80–100)
MCV RBC AUTO: 108 FL — HIGH (ref 80–100)
MCV RBC AUTO: 94.9 FL — SIGNIFICANT CHANGE UP (ref 80–100)
MCV RBC AUTO: 95 FL — SIGNIFICANT CHANGE UP (ref 80–100)
MCV RBC AUTO: 95 FL — SIGNIFICANT CHANGE UP (ref 80–100)
MCV RBC AUTO: 96.3 FL — SIGNIFICANT CHANGE UP (ref 80–100)
MCV RBC AUTO: 96.3 FL — SIGNIFICANT CHANGE UP (ref 80–100)
MCV RBC AUTO: 96.4 FL — SIGNIFICANT CHANGE UP (ref 80–100)
MCV RBC AUTO: 96.7 FL — SIGNIFICANT CHANGE UP (ref 80–100)
MCV RBC AUTO: 96.7 FL — SIGNIFICANT CHANGE UP (ref 80–100)
MCV RBC AUTO: 96.8 FL — SIGNIFICANT CHANGE UP (ref 80–100)
MCV RBC AUTO: 97 FL — SIGNIFICANT CHANGE UP (ref 80–100)
MCV RBC AUTO: 97 FL — SIGNIFICANT CHANGE UP (ref 80–100)
MCV RBC AUTO: 97.6 FL — SIGNIFICANT CHANGE UP (ref 80–100)
MCV RBC AUTO: 97.7 FL — SIGNIFICANT CHANGE UP (ref 80–100)
MCV RBC AUTO: 98.2 FL — SIGNIFICANT CHANGE UP (ref 80–100)
MCV RBC AUTO: 98.2 FL — SIGNIFICANT CHANGE UP (ref 80–100)
MCV RBC AUTO: 99.1 FL — SIGNIFICANT CHANGE UP (ref 80–100)
MCV RBC AUTO: 99.2 FL — SIGNIFICANT CHANGE UP (ref 80–100)
METAMYELOCYTES # FLD: 2 % — HIGH (ref 0–0)
METAMYELOCYTES # FLD: 2 % — HIGH (ref 0–0)
METHOD TYPE: SIGNIFICANT CHANGE UP
MICROCYTES BLD QL: SLIGHT — SIGNIFICANT CHANGE UP
MICROCYTES BLD QL: SLIGHT — SIGNIFICANT CHANGE UP
MONOCYTES # BLD AUTO: 0.28 K/UL — SIGNIFICANT CHANGE UP (ref 0–0.9)
MONOCYTES # BLD AUTO: 0.35 K/UL — SIGNIFICANT CHANGE UP (ref 0–0.9)
MONOCYTES # BLD AUTO: 0.36 K/UL — SIGNIFICANT CHANGE UP (ref 0–0.9)
MONOCYTES # BLD AUTO: 0.38 K/UL — SIGNIFICANT CHANGE UP (ref 0–0.9)
MONOCYTES # BLD AUTO: 0.4 K/UL — SIGNIFICANT CHANGE UP (ref 0–0.9)
MONOCYTES # BLD AUTO: 0.41 K/UL — SIGNIFICANT CHANGE UP (ref 0–0.9)
MONOCYTES # BLD AUTO: 0.44 K/UL — SIGNIFICANT CHANGE UP (ref 0–0.9)
MONOCYTES # BLD AUTO: 0.44 K/UL — SIGNIFICANT CHANGE UP (ref 0–0.9)
MONOCYTES # BLD AUTO: 0.5 K/UL — SIGNIFICANT CHANGE UP (ref 0–0.9)
MONOCYTES # BLD AUTO: 0.84 K/UL
MONOCYTES # BLD AUTO: 1.35 K/UL — HIGH (ref 0–0.9)
MONOCYTES NFR BLD AUTO: 2 % — SIGNIFICANT CHANGE UP (ref 2–14)
MONOCYTES NFR BLD AUTO: 3.8 % — SIGNIFICANT CHANGE UP (ref 2–14)
MONOCYTES NFR BLD AUTO: 5.3 % — SIGNIFICANT CHANGE UP (ref 2–14)
MONOCYTES NFR BLD AUTO: 5.6 % — SIGNIFICANT CHANGE UP (ref 2–14)
MONOCYTES NFR BLD AUTO: 6 % — SIGNIFICANT CHANGE UP (ref 2–14)
MONOCYTES NFR BLD AUTO: 6 % — SIGNIFICANT CHANGE UP (ref 2–14)
MONOCYTES NFR BLD AUTO: 6.1 % — SIGNIFICANT CHANGE UP (ref 2–14)
MONOCYTES NFR BLD AUTO: 6.3 % — SIGNIFICANT CHANGE UP (ref 2–14)
MONOCYTES NFR BLD AUTO: 6.5 % — SIGNIFICANT CHANGE UP (ref 2–14)
MONOCYTES NFR BLD AUTO: 6.6 %
MONOCYTES NFR BLD AUTO: 9.5 % — SIGNIFICANT CHANGE UP (ref 2–14)
MYELOCYTES NFR BLD: 1 % — HIGH (ref 0–0)
NEUTROPHILS # BLD AUTO: 10.7 K/UL
NEUTROPHILS # BLD AUTO: 18.5 K/UL — HIGH (ref 1.8–7.4)
NEUTROPHILS # BLD AUTO: 2.46 K/UL — SIGNIFICANT CHANGE UP (ref 1.8–7.4)
NEUTROPHILS # BLD AUTO: 2.96 K/UL — SIGNIFICANT CHANGE UP (ref 1.8–7.4)
NEUTROPHILS # BLD AUTO: 21.82 K/UL — HIGH (ref 1.8–7.4)
NEUTROPHILS # BLD AUTO: 3.67 K/UL — SIGNIFICANT CHANGE UP (ref 1.8–7.4)
NEUTROPHILS # BLD AUTO: 4.37 K/UL — SIGNIFICANT CHANGE UP (ref 1.8–7.4)
NEUTROPHILS # BLD AUTO: 4.41 K/UL — SIGNIFICANT CHANGE UP (ref 1.8–7.4)
NEUTROPHILS # BLD AUTO: 4.87 K/UL — SIGNIFICANT CHANGE UP (ref 1.8–7.4)
NEUTROPHILS # BLD AUTO: 5.37 K/UL — SIGNIFICANT CHANGE UP (ref 1.8–7.4)
NEUTROPHILS # BLD AUTO: 9.85 K/UL — HIGH (ref 1.8–7.4)
NEUTROPHILS NFR BLD AUTO: 61.2 % — SIGNIFICANT CHANGE UP (ref 43–77)
NEUTROPHILS NFR BLD AUTO: 64.9 % — SIGNIFICANT CHANGE UP (ref 43–77)
NEUTROPHILS NFR BLD AUTO: 66.5 % — SIGNIFICANT CHANGE UP (ref 43–77)
NEUTROPHILS NFR BLD AUTO: 67 % — SIGNIFICANT CHANGE UP (ref 43–77)
NEUTROPHILS NFR BLD AUTO: 68.7 % — SIGNIFICANT CHANGE UP (ref 43–77)
NEUTROPHILS NFR BLD AUTO: 68.8 % — SIGNIFICANT CHANGE UP (ref 43–77)
NEUTROPHILS NFR BLD AUTO: 71.2 % — SIGNIFICANT CHANGE UP (ref 43–77)
NEUTROPHILS NFR BLD AUTO: 80 % — HIGH (ref 43–77)
NEUTROPHILS NFR BLD AUTO: 83.6 %
NEUTROPHILS NFR BLD AUTO: 86.1 % — HIGH (ref 43–77)
NEUTROPHILS NFR BLD AUTO: 87 % — HIGH (ref 43–77)
NEUTS BAND # BLD: 2 % — SIGNIFICANT CHANGE UP (ref 0–8)
NITRITE UR-MCNC: NEGATIVE — SIGNIFICANT CHANGE UP
NITRITE UR-MCNC: NEGATIVE — SIGNIFICANT CHANGE UP
NRBC # BLD: 0 /100 WBCS — SIGNIFICANT CHANGE UP (ref 0–0)
NRBC # BLD: 0 /100 — SIGNIFICANT CHANGE UP (ref 0–0)
NRBC # BLD: 0 /100 — SIGNIFICANT CHANGE UP (ref 0–0)
NRBC # BLD: SIGNIFICANT CHANGE UP /100 WBCS (ref 0–0)
NRBC # BLD: SIGNIFICANT CHANGE UP /100 WBCS (ref 0–0)
NRBC # FLD: 0 K/UL — SIGNIFICANT CHANGE UP (ref 0–0)
ORGANISM # SPEC MICROSCOPIC CNT: SIGNIFICANT CHANGE UP
PH UR: 5.5 — SIGNIFICANT CHANGE UP (ref 5–8)
PH UR: 5.5 — SIGNIFICANT CHANGE UP (ref 5–8)
PHOSPHATE SERPL-MCNC: 1.9 MG/DL — LOW (ref 2.5–4.5)
PHOSPHATE SERPL-MCNC: 2.4 MG/DL — LOW (ref 2.5–4.5)
PHOSPHATE SERPL-MCNC: 2.4 MG/DL — LOW (ref 2.5–4.5)
PHOSPHATE SERPL-MCNC: 2.5 MG/DL — SIGNIFICANT CHANGE UP (ref 2.5–4.5)
PHOSPHATE SERPL-MCNC: 2.6 MG/DL — SIGNIFICANT CHANGE UP (ref 2.5–4.5)
PHOSPHATE SERPL-MCNC: 2.6 MG/DL — SIGNIFICANT CHANGE UP (ref 2.5–4.5)
PHOSPHATE SERPL-MCNC: 2.7 MG/DL — SIGNIFICANT CHANGE UP (ref 2.5–4.5)
PHOSPHATE SERPL-MCNC: 2.9 MG/DL — SIGNIFICANT CHANGE UP (ref 2.5–4.5)
PHOSPHATE SERPL-MCNC: 2.9 MG/DL — SIGNIFICANT CHANGE UP (ref 2.5–4.5)
PHOSPHATE SERPL-MCNC: 3 MG/DL — SIGNIFICANT CHANGE UP (ref 2.5–4.5)
PHOSPHATE SERPL-MCNC: 3 MG/DL — SIGNIFICANT CHANGE UP (ref 2.5–4.5)
PHOSPHATE SERPL-MCNC: 3.4 MG/DL — SIGNIFICANT CHANGE UP (ref 2.5–4.5)
PHOSPHATE SERPL-MCNC: 3.7 MG/DL — SIGNIFICANT CHANGE UP (ref 2.5–4.5)
PHOSPHATE SERPL-MCNC: 3.7 MG/DL — SIGNIFICANT CHANGE UP (ref 2.5–4.5)
PHOSPHATE SERPL-MCNC: 3.9 MG/DL — SIGNIFICANT CHANGE UP (ref 2.5–4.5)
PHOSPHATE SERPL-MCNC: 4 MG/DL — SIGNIFICANT CHANGE UP (ref 2.5–4.5)
PLAT MORPH BLD: NORMAL — SIGNIFICANT CHANGE UP
PLAT MORPH BLD: NORMAL — SIGNIFICANT CHANGE UP
PLATELET # BLD AUTO: 120 K/UL — LOW (ref 150–400)
PLATELET # BLD AUTO: 136 K/UL — LOW (ref 150–400)
PLATELET # BLD AUTO: 14 K/UL — CRITICAL LOW (ref 150–400)
PLATELET # BLD AUTO: 15 K/UL — CRITICAL LOW (ref 150–400)
PLATELET # BLD AUTO: 15 K/UL — CRITICAL LOW (ref 150–400)
PLATELET # BLD AUTO: 152 K/UL — SIGNIFICANT CHANGE UP (ref 150–400)
PLATELET # BLD AUTO: 169 K/UL — SIGNIFICANT CHANGE UP (ref 150–400)
PLATELET # BLD AUTO: 184 K/UL — SIGNIFICANT CHANGE UP (ref 150–400)
PLATELET # BLD AUTO: 186 K/UL — SIGNIFICANT CHANGE UP (ref 150–400)
PLATELET # BLD AUTO: 189 K/UL — SIGNIFICANT CHANGE UP (ref 150–400)
PLATELET # BLD AUTO: 191 K/UL — SIGNIFICANT CHANGE UP (ref 150–400)
PLATELET # BLD AUTO: 195 K/UL — SIGNIFICANT CHANGE UP (ref 150–400)
PLATELET # BLD AUTO: 200 K/UL — SIGNIFICANT CHANGE UP (ref 150–400)
PLATELET # BLD AUTO: 210 K/UL — SIGNIFICANT CHANGE UP (ref 150–400)
PLATELET # BLD AUTO: 212 K/UL — SIGNIFICANT CHANGE UP (ref 150–400)
PLATELET # BLD AUTO: 215 K/UL — SIGNIFICANT CHANGE UP (ref 150–400)
PLATELET # BLD AUTO: 228 K/UL — SIGNIFICANT CHANGE UP (ref 150–400)
PLATELET # BLD AUTO: 238 K/UL — SIGNIFICANT CHANGE UP (ref 150–400)
PLATELET # BLD AUTO: 249 K/UL — SIGNIFICANT CHANGE UP (ref 150–400)
PLATELET # BLD AUTO: 28 K/UL — LOW (ref 150–400)
PLATELET # BLD AUTO: 43 K/UL — LOW (ref 150–400)
PLATELET # BLD AUTO: 43 K/UL — LOW (ref 150–400)
PLATELET # BLD AUTO: 433 K/UL
PLATELET # BLD AUTO: 75 K/UL — LOW (ref 150–400)
PLATELET # BLD AUTO: 75 K/UL — LOW (ref 150–400)
POIKILOCYTOSIS BLD QL AUTO: SLIGHT — SIGNIFICANT CHANGE UP
POIKILOCYTOSIS BLD QL AUTO: SLIGHT — SIGNIFICANT CHANGE UP
POLYCHROMASIA BLD QL SMEAR: SLIGHT — SIGNIFICANT CHANGE UP
POLYCHROMASIA BLD QL SMEAR: SLIGHT — SIGNIFICANT CHANGE UP
POTASSIUM SERPL-MCNC: 2.5 MMOL/L — CRITICAL LOW (ref 3.5–5.3)
POTASSIUM SERPL-MCNC: 2.5 MMOL/L — CRITICAL LOW (ref 3.5–5.3)
POTASSIUM SERPL-MCNC: 2.6 MMOL/L — CRITICAL LOW (ref 3.5–5.3)
POTASSIUM SERPL-MCNC: 2.6 MMOL/L — CRITICAL LOW (ref 3.5–5.3)
POTASSIUM SERPL-MCNC: 3.1 MMOL/L — LOW (ref 3.5–5.3)
POTASSIUM SERPL-MCNC: 3.3 MMOL/L — LOW (ref 3.5–5.3)
POTASSIUM SERPL-MCNC: 3.4 MMOL/L — LOW (ref 3.5–5.3)
POTASSIUM SERPL-MCNC: 3.5 MMOL/L — SIGNIFICANT CHANGE UP (ref 3.5–5.3)
POTASSIUM SERPL-MCNC: 3.5 MMOL/L — SIGNIFICANT CHANGE UP (ref 3.5–5.3)
POTASSIUM SERPL-MCNC: 3.6 MMOL/L — SIGNIFICANT CHANGE UP (ref 3.5–5.3)
POTASSIUM SERPL-MCNC: 3.6 MMOL/L — SIGNIFICANT CHANGE UP (ref 3.5–5.3)
POTASSIUM SERPL-MCNC: 3.7 MMOL/L — SIGNIFICANT CHANGE UP (ref 3.5–5.3)
POTASSIUM SERPL-MCNC: 3.7 MMOL/L — SIGNIFICANT CHANGE UP (ref 3.5–5.3)
POTASSIUM SERPL-MCNC: 3.8 MMOL/L — SIGNIFICANT CHANGE UP (ref 3.5–5.3)
POTASSIUM SERPL-MCNC: 3.9 MMOL/L — SIGNIFICANT CHANGE UP (ref 3.5–5.3)
POTASSIUM SERPL-MCNC: 4.2 MMOL/L — SIGNIFICANT CHANGE UP (ref 3.5–5.3)
POTASSIUM SERPL-MCNC: 4.6 MMOL/L — SIGNIFICANT CHANGE UP (ref 3.5–5.3)
POTASSIUM SERPL-MCNC: 4.7 MMOL/L — SIGNIFICANT CHANGE UP (ref 3.5–5.3)
POTASSIUM SERPL-MCNC: 4.7 MMOL/L — SIGNIFICANT CHANGE UP (ref 3.5–5.3)
POTASSIUM SERPL-SCNC: 2.5 MMOL/L — CRITICAL LOW (ref 3.5–5.3)
POTASSIUM SERPL-SCNC: 2.5 MMOL/L — CRITICAL LOW (ref 3.5–5.3)
POTASSIUM SERPL-SCNC: 2.6 MMOL/L — CRITICAL LOW (ref 3.5–5.3)
POTASSIUM SERPL-SCNC: 2.6 MMOL/L — CRITICAL LOW (ref 3.5–5.3)
POTASSIUM SERPL-SCNC: 3.1 MMOL/L — LOW (ref 3.5–5.3)
POTASSIUM SERPL-SCNC: 3.3 MMOL/L — LOW (ref 3.5–5.3)
POTASSIUM SERPL-SCNC: 3.4 MMOL/L
POTASSIUM SERPL-SCNC: 3.4 MMOL/L — LOW (ref 3.5–5.3)
POTASSIUM SERPL-SCNC: 3.5 MMOL/L — SIGNIFICANT CHANGE UP (ref 3.5–5.3)
POTASSIUM SERPL-SCNC: 3.5 MMOL/L — SIGNIFICANT CHANGE UP (ref 3.5–5.3)
POTASSIUM SERPL-SCNC: 3.6 MMOL/L — SIGNIFICANT CHANGE UP (ref 3.5–5.3)
POTASSIUM SERPL-SCNC: 3.6 MMOL/L — SIGNIFICANT CHANGE UP (ref 3.5–5.3)
POTASSIUM SERPL-SCNC: 3.7 MMOL/L — SIGNIFICANT CHANGE UP (ref 3.5–5.3)
POTASSIUM SERPL-SCNC: 3.7 MMOL/L — SIGNIFICANT CHANGE UP (ref 3.5–5.3)
POTASSIUM SERPL-SCNC: 3.8 MMOL/L — SIGNIFICANT CHANGE UP (ref 3.5–5.3)
POTASSIUM SERPL-SCNC: 3.9 MMOL/L — SIGNIFICANT CHANGE UP (ref 3.5–5.3)
POTASSIUM SERPL-SCNC: 4.2 MMOL/L — SIGNIFICANT CHANGE UP (ref 3.5–5.3)
POTASSIUM SERPL-SCNC: 4.6 MMOL/L — SIGNIFICANT CHANGE UP (ref 3.5–5.3)
POTASSIUM SERPL-SCNC: 4.7 MMOL/L — SIGNIFICANT CHANGE UP (ref 3.5–5.3)
POTASSIUM SERPL-SCNC: 4.7 MMOL/L — SIGNIFICANT CHANGE UP (ref 3.5–5.3)
PROT SERPL-MCNC: 4.2 GM/DL — LOW (ref 6–8.3)
PROT SERPL-MCNC: 4.2 GM/DL — LOW (ref 6–8.3)
PROT SERPL-MCNC: 4.3 GM/DL — LOW (ref 6–8.3)
PROT SERPL-MCNC: 4.3 GM/DL — LOW (ref 6–8.3)
PROT SERPL-MCNC: 4.6 GM/DL — LOW (ref 6–8.3)
PROT SERPL-MCNC: 4.6 GM/DL — LOW (ref 6–8.3)
PROT SERPL-MCNC: 5 GM/DL — LOW (ref 6–8.3)
PROT SERPL-MCNC: 5 GM/DL — LOW (ref 6–8.3)
PROT SERPL-MCNC: 5.8 G/DL — LOW (ref 6–8.3)
PROT SERPL-MCNC: 6 G/DL — SIGNIFICANT CHANGE UP (ref 6–8.3)
PROT SERPL-MCNC: 6.1 G/DL — SIGNIFICANT CHANGE UP (ref 6–8.3)
PROT SERPL-MCNC: 6.3 G/DL — SIGNIFICANT CHANGE UP (ref 6–8.3)
PROT SERPL-MCNC: 6.4 G/DL
PROT SERPL-MCNC: 6.4 G/DL — SIGNIFICANT CHANGE UP (ref 6–8.3)
PROT SERPL-MCNC: 6.4 G/DL — SIGNIFICANT CHANGE UP (ref 6–8.3)
PROT SERPL-MCNC: 6.7 G/DL — SIGNIFICANT CHANGE UP (ref 6–8.3)
PROT SERPL-MCNC: 6.7 G/DL — SIGNIFICANT CHANGE UP (ref 6–8.3)
PROT SERPL-MCNC: 7 G/DL — SIGNIFICANT CHANGE UP (ref 6–8.3)
PROT UR-MCNC: NEGATIVE MG/DL — SIGNIFICANT CHANGE UP
PROT UR-MCNC: NEGATIVE MG/DL — SIGNIFICANT CHANGE UP
PROTHROM AB SERPL-ACNC: 10.4 SEC — SIGNIFICANT CHANGE UP (ref 9.5–13)
PROTHROM AB SERPL-ACNC: 10.4 SEC — SIGNIFICANT CHANGE UP (ref 9.5–13)
PROTHROM AB SERPL-ACNC: 12.4 SEC — SIGNIFICANT CHANGE UP (ref 9.5–13)
PROTHROM AB SERPL-ACNC: 12.4 SEC — SIGNIFICANT CHANGE UP (ref 9.5–13)
PROTHROM AB SERPL-ACNC: 9.6 SEC — SIGNIFICANT CHANGE UP (ref 9.5–13)
PROTHROM AB SERPL-ACNC: 9.9 SEC — SIGNIFICANT CHANGE UP (ref 9.5–13)
RBC # BLD: 2.19 M/UL — LOW (ref 3.8–5.2)
RBC # BLD: 2.19 M/UL — LOW (ref 3.8–5.2)
RBC # BLD: 2.28 M/UL — LOW (ref 3.8–5.2)
RBC # BLD: 2.28 M/UL — LOW (ref 3.8–5.2)
RBC # BLD: 2.35 M/UL — LOW (ref 3.8–5.2)
RBC # BLD: 2.35 M/UL — LOW (ref 3.8–5.2)
RBC # BLD: 2.39 M/UL — LOW (ref 3.8–5.2)
RBC # BLD: 2.39 M/UL — LOW (ref 3.8–5.2)
RBC # BLD: 2.42 M/UL — LOW (ref 3.8–5.2)
RBC # BLD: 2.42 M/UL — LOW (ref 3.8–5.2)
RBC # BLD: 2.44 M/UL — LOW (ref 3.8–5.2)
RBC # BLD: 2.44 M/UL — LOW (ref 3.8–5.2)
RBC # BLD: 2.49 M/UL — LOW (ref 3.8–5.2)
RBC # BLD: 2.49 M/UL — LOW (ref 3.8–5.2)
RBC # BLD: 2.56 M/UL — LOW (ref 3.8–5.2)
RBC # BLD: 2.76 M/UL — LOW (ref 3.8–5.2)
RBC # BLD: 2.89 M/UL — LOW (ref 3.8–5.2)
RBC # BLD: 3.03 M/UL — LOW (ref 3.8–5.2)
RBC # BLD: 3.04 M/UL — LOW (ref 3.8–5.2)
RBC # BLD: 3.07 M/UL
RBC # BLD: 3.08 M/UL — LOW (ref 3.8–5.2)
RBC # BLD: 3.29 M/UL — LOW (ref 3.8–5.2)
RBC # BLD: 3.36 M/UL — LOW (ref 3.8–5.2)
RBC # BLD: 3.52 M/UL — LOW (ref 3.8–5.2)
RBC # BLD: 3.55 M/UL — LOW (ref 3.8–5.2)
RBC # BLD: 3.57 M/UL — LOW (ref 3.8–5.2)
RBC # BLD: 3.64 M/UL — LOW (ref 3.8–5.2)
RBC # BLD: 3.7 M/UL — LOW (ref 3.8–5.2)
RBC # BLD: 4.06 M/UL — SIGNIFICANT CHANGE UP (ref 3.8–5.2)
RBC # BLD: 4.32 M/UL — SIGNIFICANT CHANGE UP (ref 3.8–5.2)
RBC # BLD: 4.34 M/UL — SIGNIFICANT CHANGE UP (ref 3.8–5.2)
RBC # FLD: 12.4 % — SIGNIFICANT CHANGE UP (ref 10.3–14.5)
RBC # FLD: 12.7 % — SIGNIFICANT CHANGE UP (ref 10.3–14.5)
RBC # FLD: 12.7 % — SIGNIFICANT CHANGE UP (ref 10.3–14.5)
RBC # FLD: 12.8 % — SIGNIFICANT CHANGE UP (ref 10.3–14.5)
RBC # FLD: 13 % — SIGNIFICANT CHANGE UP (ref 10.3–14.5)
RBC # FLD: 13.2 % — SIGNIFICANT CHANGE UP (ref 10.3–14.5)
RBC # FLD: 13.4 % — SIGNIFICANT CHANGE UP (ref 10.3–14.5)
RBC # FLD: 13.5 % — SIGNIFICANT CHANGE UP (ref 10.3–14.5)
RBC # FLD: 13.7 % — SIGNIFICANT CHANGE UP (ref 10.3–14.5)
RBC # FLD: 13.8 % — SIGNIFICANT CHANGE UP (ref 10.3–14.5)
RBC # FLD: 13.9 % — SIGNIFICANT CHANGE UP (ref 10.3–14.5)
RBC # FLD: 14 % — SIGNIFICANT CHANGE UP (ref 10.3–14.5)
RBC # FLD: 14.1 % — SIGNIFICANT CHANGE UP (ref 10.3–14.5)
RBC # FLD: 14.1 % — SIGNIFICANT CHANGE UP (ref 10.3–14.5)
RBC # FLD: 14.2 % — SIGNIFICANT CHANGE UP (ref 10.3–14.5)
RBC # FLD: 14.2 % — SIGNIFICANT CHANGE UP (ref 10.3–14.5)
RBC # FLD: 14.3 %
RBC # FLD: 14.3 % — SIGNIFICANT CHANGE UP (ref 10.3–14.5)
RBC # FLD: 14.4 % — SIGNIFICANT CHANGE UP (ref 10.3–14.5)
RBC # FLD: 14.6 % — HIGH (ref 10.3–14.5)
RBC # FLD: 14.6 % — HIGH (ref 10.3–14.5)
RBC # FLD: 17.7 % — HIGH (ref 10.3–14.5)
RBC # FLD: 18.4 % — HIGH (ref 10.3–14.5)
RBC BLD AUTO: SIGNIFICANT CHANGE UP
RBC BLD AUTO: SIGNIFICANT CHANGE UP
RH IG SCN BLD-IMP: POSITIVE — SIGNIFICANT CHANGE UP
SODIUM SERPL-SCNC: 135 MMOL/L — SIGNIFICANT CHANGE UP (ref 135–145)
SODIUM SERPL-SCNC: 135 MMOL/L — SIGNIFICANT CHANGE UP (ref 135–145)
SODIUM SERPL-SCNC: 136 MMOL/L
SODIUM SERPL-SCNC: 136 MMOL/L — SIGNIFICANT CHANGE UP (ref 135–145)
SODIUM SERPL-SCNC: 137 MMOL/L — SIGNIFICANT CHANGE UP (ref 135–145)
SODIUM SERPL-SCNC: 138 MMOL/L — SIGNIFICANT CHANGE UP (ref 135–145)
SODIUM SERPL-SCNC: 139 MMOL/L — SIGNIFICANT CHANGE UP (ref 135–145)
SODIUM SERPL-SCNC: 140 MMOL/L — SIGNIFICANT CHANGE UP (ref 135–145)
SODIUM SERPL-SCNC: 141 MMOL/L — SIGNIFICANT CHANGE UP (ref 135–145)
SODIUM SERPL-SCNC: 142 MMOL/L — SIGNIFICANT CHANGE UP (ref 135–145)
SODIUM SERPL-SCNC: 142 MMOL/L — SIGNIFICANT CHANGE UP (ref 135–145)
SODIUM SERPL-SCNC: 143 MMOL/L — SIGNIFICANT CHANGE UP (ref 135–145)
SODIUM SERPL-SCNC: 144 MMOL/L — SIGNIFICANT CHANGE UP (ref 135–145)
SP GR SPEC: 1.02 — SIGNIFICANT CHANGE UP (ref 1–1.03)
SP GR SPEC: 1.02 — SIGNIFICANT CHANGE UP (ref 1–1.03)
SPECIMEN SOURCE: SIGNIFICANT CHANGE UP
SURGICAL PATHOLOGY STUDY: SIGNIFICANT CHANGE UP
UROBILINOGEN FLD QL: 1 MG/DL — SIGNIFICANT CHANGE UP (ref 0.2–1)
UROBILINOGEN FLD QL: 1 MG/DL — SIGNIFICANT CHANGE UP (ref 0.2–1)
VIT B12 SERPL-MCNC: 391 PG/ML — SIGNIFICANT CHANGE UP (ref 232–1245)
WBC # BLD: 10.88 K/UL — HIGH (ref 3.8–10.5)
WBC # BLD: 10.88 K/UL — HIGH (ref 3.8–10.5)
WBC # BLD: 11.24 K/UL — HIGH (ref 3.8–10.5)
WBC # BLD: 11.45 K/UL — HIGH (ref 3.8–10.5)
WBC # BLD: 22.56 K/UL — HIGH (ref 3.8–10.5)
WBC # BLD: 25.08 K/UL — HIGH (ref 3.8–10.5)
WBC # BLD: 4.02 K/UL — SIGNIFICANT CHANGE UP (ref 3.8–10.5)
WBC # BLD: 4.31 K/UL — SIGNIFICANT CHANGE UP (ref 3.8–10.5)
WBC # BLD: 5.12 K/UL — SIGNIFICANT CHANGE UP (ref 3.8–10.5)
WBC # BLD: 5.12 K/UL — SIGNIFICANT CHANGE UP (ref 3.8–10.5)
WBC # BLD: 5.52 K/UL — SIGNIFICANT CHANGE UP (ref 3.8–10.5)
WBC # BLD: 5.73 K/UL — SIGNIFICANT CHANGE UP (ref 3.8–10.5)
WBC # BLD: 5.73 K/UL — SIGNIFICANT CHANGE UP (ref 3.8–10.5)
WBC # BLD: 6.2 K/UL — SIGNIFICANT CHANGE UP (ref 3.8–10.5)
WBC # BLD: 6.58 K/UL — SIGNIFICANT CHANGE UP (ref 3.8–10.5)
WBC # BLD: 6.7 K/UL — SIGNIFICANT CHANGE UP (ref 3.8–10.5)
WBC # BLD: 6.74 K/UL — SIGNIFICANT CHANGE UP (ref 3.8–10.5)
WBC # BLD: 6.85 K/UL — SIGNIFICANT CHANGE UP (ref 3.8–10.5)
WBC # BLD: 7.36 K/UL — SIGNIFICANT CHANGE UP (ref 3.8–10.5)
WBC # BLD: 7.36 K/UL — SIGNIFICANT CHANGE UP (ref 3.8–10.5)
WBC # BLD: 7.47 K/UL — SIGNIFICANT CHANGE UP (ref 3.8–10.5)
WBC # BLD: 7.47 K/UL — SIGNIFICANT CHANGE UP (ref 3.8–10.5)
WBC # BLD: 7.78 K/UL — SIGNIFICANT CHANGE UP (ref 3.8–10.5)
WBC # BLD: 7.78 K/UL — SIGNIFICANT CHANGE UP (ref 3.8–10.5)
WBC # BLD: 7.81 K/UL — SIGNIFICANT CHANGE UP (ref 3.8–10.5)
WBC # BLD: 7.83 K/UL — SIGNIFICANT CHANGE UP (ref 3.8–10.5)
WBC # BLD: 7.83 K/UL — SIGNIFICANT CHANGE UP (ref 3.8–10.5)
WBC # BLD: 8.02 K/UL — SIGNIFICANT CHANGE UP (ref 3.8–10.5)
WBC # BLD: 8.61 K/UL — SIGNIFICANT CHANGE UP (ref 3.8–10.5)
WBC # BLD: 9.01 K/UL — SIGNIFICANT CHANGE UP (ref 3.8–10.5)
WBC # FLD AUTO: 10.88 K/UL — HIGH (ref 3.8–10.5)
WBC # FLD AUTO: 10.88 K/UL — HIGH (ref 3.8–10.5)
WBC # FLD AUTO: 11.24 K/UL — HIGH (ref 3.8–10.5)
WBC # FLD AUTO: 11.45 K/UL — HIGH (ref 3.8–10.5)
WBC # FLD AUTO: 12.79 K/UL
WBC # FLD AUTO: 22.56 K/UL — HIGH (ref 3.8–10.5)
WBC # FLD AUTO: 25.08 K/UL — HIGH (ref 3.8–10.5)
WBC # FLD AUTO: 4.02 K/UL — SIGNIFICANT CHANGE UP (ref 3.8–10.5)
WBC # FLD AUTO: 4.31 K/UL — SIGNIFICANT CHANGE UP (ref 3.8–10.5)
WBC # FLD AUTO: 5.12 K/UL — SIGNIFICANT CHANGE UP (ref 3.8–10.5)
WBC # FLD AUTO: 5.12 K/UL — SIGNIFICANT CHANGE UP (ref 3.8–10.5)
WBC # FLD AUTO: 5.52 K/UL — SIGNIFICANT CHANGE UP (ref 3.8–10.5)
WBC # FLD AUTO: 5.73 K/UL — SIGNIFICANT CHANGE UP (ref 3.8–10.5)
WBC # FLD AUTO: 5.73 K/UL — SIGNIFICANT CHANGE UP (ref 3.8–10.5)
WBC # FLD AUTO: 6.2 K/UL — SIGNIFICANT CHANGE UP (ref 3.8–10.5)
WBC # FLD AUTO: 6.58 K/UL — SIGNIFICANT CHANGE UP (ref 3.8–10.5)
WBC # FLD AUTO: 6.7 K/UL — SIGNIFICANT CHANGE UP (ref 3.8–10.5)
WBC # FLD AUTO: 6.74 K/UL — SIGNIFICANT CHANGE UP (ref 3.8–10.5)
WBC # FLD AUTO: 6.85 K/UL — SIGNIFICANT CHANGE UP (ref 3.8–10.5)
WBC # FLD AUTO: 7.36 K/UL — SIGNIFICANT CHANGE UP (ref 3.8–10.5)
WBC # FLD AUTO: 7.36 K/UL — SIGNIFICANT CHANGE UP (ref 3.8–10.5)
WBC # FLD AUTO: 7.47 K/UL — SIGNIFICANT CHANGE UP (ref 3.8–10.5)
WBC # FLD AUTO: 7.47 K/UL — SIGNIFICANT CHANGE UP (ref 3.8–10.5)
WBC # FLD AUTO: 7.78 K/UL — SIGNIFICANT CHANGE UP (ref 3.8–10.5)
WBC # FLD AUTO: 7.78 K/UL — SIGNIFICANT CHANGE UP (ref 3.8–10.5)
WBC # FLD AUTO: 7.81 K/UL — SIGNIFICANT CHANGE UP (ref 3.8–10.5)
WBC # FLD AUTO: 7.83 K/UL — SIGNIFICANT CHANGE UP (ref 3.8–10.5)
WBC # FLD AUTO: 7.83 K/UL — SIGNIFICANT CHANGE UP (ref 3.8–10.5)
WBC # FLD AUTO: 8.02 K/UL — SIGNIFICANT CHANGE UP (ref 3.8–10.5)
WBC # FLD AUTO: 8.61 K/UL — SIGNIFICANT CHANGE UP (ref 3.8–10.5)
WBC # FLD AUTO: 9.01 K/UL — SIGNIFICANT CHANGE UP (ref 3.8–10.5)

## 2023-01-01 PROCEDURE — P9100: CPT

## 2023-01-01 PROCEDURE — 97162 PT EVAL MOD COMPLEX 30 MIN: CPT | Mod: GP

## 2023-01-01 PROCEDURE — 85385 FIBRINOGEN ANTIGEN: CPT

## 2023-01-01 PROCEDURE — 96367 TX/PROPH/DG ADDL SEQ IV INF: CPT

## 2023-01-01 PROCEDURE — 96365 THER/PROPH/DIAG IV INF INIT: CPT

## 2023-01-01 PROCEDURE — 86301 IMMUNOASSAY TUMOR CA 19-9: CPT

## 2023-01-01 PROCEDURE — 96416 CHEMO PROLONG INFUSE W/PUMP: CPT

## 2023-01-01 PROCEDURE — 74177 CT ABD & PELVIS W/CONTRAST: CPT

## 2023-01-01 PROCEDURE — 96417 CHEMO IV INFUS EACH ADDL SEQ: CPT

## 2023-01-01 PROCEDURE — 93005 ELECTROCARDIOGRAM TRACING: CPT

## 2023-01-01 PROCEDURE — 85027 COMPLETE CBC AUTOMATED: CPT

## 2023-01-01 PROCEDURE — 82728 ASSAY OF FERRITIN: CPT

## 2023-01-01 PROCEDURE — 96377 APPLICATON ON-BODY INJECTOR: CPT

## 2023-01-01 PROCEDURE — 82378 CARCINOEMBRYONIC ANTIGEN: CPT

## 2023-01-01 PROCEDURE — 96413 CHEMO IV INFUSION 1 HR: CPT

## 2023-01-01 PROCEDURE — 80053 COMPREHEN METABOLIC PANEL: CPT

## 2023-01-01 PROCEDURE — 99024 POSTOP FOLLOW-UP VISIT: CPT

## 2023-01-01 PROCEDURE — 99233 SBSQ HOSP IP/OBS HIGH 50: CPT

## 2023-01-01 PROCEDURE — 80048 BASIC METABOLIC PNL TOTAL CA: CPT

## 2023-01-01 PROCEDURE — 99213 OFFICE O/P EST LOW 20 MIN: CPT

## 2023-01-01 PROCEDURE — 96415 CHEMO IV INFUSION ADDL HR: CPT

## 2023-01-01 PROCEDURE — 96523 IRRIG DRUG DELIVERY DEVICE: CPT

## 2023-01-01 PROCEDURE — 99222 1ST HOSP IP/OBS MODERATE 55: CPT

## 2023-01-01 PROCEDURE — 49905 OMENTAL FLAP INTRA-ABDOM: CPT | Mod: 82

## 2023-01-01 PROCEDURE — 90686 IIV4 VACC NO PRSV 0.5 ML IM: CPT

## 2023-01-01 PROCEDURE — 93010 ELECTROCARDIOGRAM REPORT: CPT

## 2023-01-01 PROCEDURE — 86850 RBC ANTIBODY SCREEN: CPT

## 2023-01-01 PROCEDURE — 99214 OFFICE O/P EST MOD 30 MIN: CPT

## 2023-01-01 PROCEDURE — 96375 TX/PRO/DX INJ NEW DRUG ADDON: CPT

## 2023-01-01 PROCEDURE — 49905 OMENTAL FLAP INTRA-ABDOM: CPT

## 2023-01-01 PROCEDURE — 36591 DRAW BLOOD OFF VENOUS DEVICE: CPT

## 2023-01-01 PROCEDURE — 84100 ASSAY OF PHOSPHORUS: CPT

## 2023-01-01 PROCEDURE — 48153 PANCREATECTOMY: CPT

## 2023-01-01 PROCEDURE — 88309 TISSUE EXAM BY PATHOLOGIST: CPT | Mod: 26

## 2023-01-01 PROCEDURE — 87040 BLOOD CULTURE FOR BACTERIA: CPT

## 2023-01-01 PROCEDURE — 90471 IMMUNIZATION ADMIN: CPT

## 2023-01-01 PROCEDURE — 93306 TTE W/DOPPLER COMPLETE: CPT

## 2023-01-01 PROCEDURE — 71260 CT THORAX DX C+: CPT

## 2023-01-01 PROCEDURE — 83735 ASSAY OF MAGNESIUM: CPT

## 2023-01-01 PROCEDURE — 74176 CT ABD & PELVIS W/O CONTRAST: CPT

## 2023-01-01 PROCEDURE — C1751: CPT

## 2023-01-01 PROCEDURE — 48153 PANCREATECTOMY: CPT | Mod: 82

## 2023-01-01 PROCEDURE — 96368 THER/DIAG CONCURRENT INF: CPT

## 2023-01-01 PROCEDURE — 96411 CHEMO IV PUSH ADDL DRUG: CPT

## 2023-01-01 PROCEDURE — 93320 DOPPLER ECHO COMPLETE: CPT

## 2023-01-01 PROCEDURE — C9113: CPT

## 2023-01-01 PROCEDURE — ZZZZZ: CPT

## 2023-01-01 PROCEDURE — 86901 BLOOD TYPING SEROLOGIC RH(D): CPT

## 2023-01-01 PROCEDURE — 85379 FIBRIN DEGRADATION QUANT: CPT

## 2023-01-01 PROCEDURE — 87077 CULTURE AEROBIC IDENTIFY: CPT

## 2023-01-01 PROCEDURE — 97530 THERAPEUTIC ACTIVITIES: CPT | Mod: GP

## 2023-01-01 PROCEDURE — 96374 THER/PROPH/DIAG INJ IV PUSH: CPT

## 2023-01-01 PROCEDURE — 93351 STRESS TTE COMPLETE: CPT

## 2023-01-01 PROCEDURE — 99232 SBSQ HOSP IP/OBS MODERATE 35: CPT

## 2023-01-01 PROCEDURE — P9037: CPT

## 2023-01-01 PROCEDURE — 99231 SBSQ HOSP IP/OBS SF/LOW 25: CPT

## 2023-01-01 PROCEDURE — 81001 URINALYSIS AUTO W/SCOPE: CPT

## 2023-01-01 PROCEDURE — 82607 VITAMIN B-12: CPT

## 2023-01-01 PROCEDURE — 71045 X-RAY EXAM CHEST 1 VIEW: CPT | Mod: 26

## 2023-01-01 PROCEDURE — 74176 CT ABD & PELVIS W/O CONTRAST: CPT | Mod: 26

## 2023-01-01 PROCEDURE — 74177 CT ABD & PELVIS W/CONTRAST: CPT | Mod: 26

## 2023-01-01 PROCEDURE — 85610 PROTHROMBIN TIME: CPT

## 2023-01-01 PROCEDURE — 94640 AIRWAY INHALATION TREATMENT: CPT

## 2023-01-01 PROCEDURE — 87081 CULTURE SCREEN ONLY: CPT

## 2023-01-01 PROCEDURE — 36415 COLL VENOUS BLD VENIPUNCTURE: CPT

## 2023-01-01 PROCEDURE — 93325 DOPPLER ECHO COLOR FLOW MAPG: CPT

## 2023-01-01 PROCEDURE — 93350 STRESS TTE ONLY: CPT | Mod: 26

## 2023-01-01 PROCEDURE — 36430 TRANSFUSION BLD/BLD COMPNT: CPT

## 2023-01-01 PROCEDURE — 71045 X-RAY EXAM CHEST 1 VIEW: CPT

## 2023-01-01 PROCEDURE — 87186 SC STD MICRODIL/AGAR DIL: CPT

## 2023-01-01 PROCEDURE — 88304 TISSUE EXAM BY PATHOLOGIST: CPT | Mod: 26

## 2023-01-01 PROCEDURE — 85025 COMPLETE CBC W/AUTO DIFF WBC: CPT

## 2023-01-01 PROCEDURE — 85730 THROMBOPLASTIN TIME PARTIAL: CPT

## 2023-01-01 PROCEDURE — 86900 BLOOD TYPING SEROLOGIC ABO: CPT

## 2023-01-01 PROCEDURE — 99222 1ST HOSP IP/OBS MODERATE 55: CPT | Mod: GC,24

## 2023-01-01 DEVICE — LIGATING CLIPS WECK HORIZON LARGE (ORANGE) 24: Type: IMPLANTABLE DEVICE | Status: FUNCTIONAL

## 2023-01-01 DEVICE — SURGICEL POWDER 3 GRAMS: Type: IMPLANTABLE DEVICE | Status: FUNCTIONAL

## 2023-01-01 DEVICE — SURGICEL FIBRILLAR 2 X 4": Type: IMPLANTABLE DEVICE | Status: FUNCTIONAL

## 2023-01-01 DEVICE — STAPLER ETHICON ECHELON ENDOPATH VASCULAR 35MM WHITE RELOAD: Type: IMPLANTABLE DEVICE | Status: FUNCTIONAL

## 2023-01-01 DEVICE — LIGATING CLIPS WECK HORIZON MEDIUM (BLUE) 24: Type: IMPLANTABLE DEVICE | Status: FUNCTIONAL

## 2023-01-01 DEVICE — SURGICEL 4 X 8": Type: IMPLANTABLE DEVICE | Status: FUNCTIONAL

## 2023-01-01 DEVICE — STAPLER COVIDIEN TRI-STAPLE 60MM PURPLE RELOAD: Type: IMPLANTABLE DEVICE | Status: FUNCTIONAL

## 2023-01-01 DEVICE — STAPLER ETHICON ECHELON ENDOPATH GRIP SURFACE 60MM WHITE RELOAD: Type: IMPLANTABLE DEVICE | Status: FUNCTIONAL

## 2023-01-01 RX ORDER — LIPASE/PROTEASE/AMYLASE 16-48-48K
1 CAPSULE,DELAYED RELEASE (ENTERIC COATED) ORAL
Refills: 0 | DISCHARGE

## 2023-01-01 RX ORDER — RIVAROXABAN 15 MG/1
15 TABLET, FILM COATED ORAL
Qty: 30 | Refills: 2 | Status: ACTIVE | COMMUNITY
Start: 2023-01-01 | End: 1900-01-01

## 2023-01-01 RX ORDER — ACETAMINOPHEN 500 MG
650 TABLET ORAL EVERY 6 HOURS
Refills: 0 | Status: COMPLETED | OUTPATIENT
Start: 2023-01-01 | End: 2023-01-01

## 2023-01-01 RX ORDER — ACETAMINOPHEN 500 MG
3 TABLET ORAL
Qty: 0 | Refills: 0 | DISCHARGE
Start: 2023-01-01

## 2023-01-01 RX ORDER — LABETALOL HCL 100 MG
200 TABLET ORAL DAILY
Refills: 0 | Status: DISCONTINUED | OUTPATIENT
Start: 2023-01-01 | End: 2023-01-01

## 2023-01-01 RX ORDER — PIPERACILLIN AND TAZOBACTAM 4; .5 G/20ML; G/20ML
3.38 INJECTION, POWDER, LYOPHILIZED, FOR SOLUTION INTRAVENOUS ONCE
Refills: 0 | Status: COMPLETED | OUTPATIENT
Start: 2023-01-01 | End: 2023-01-01

## 2023-01-01 RX ORDER — GABAPENTIN 100 MG/1
100 CAPSULE ORAL 3 TIMES DAILY
Qty: 30 | Refills: 0 | Status: ACTIVE | COMMUNITY
Start: 2023-01-01 | End: 1900-01-01

## 2023-01-01 RX ORDER — ACETAMINOPHEN 500 MG
1000 TABLET ORAL EVERY 6 HOURS
Refills: 0 | Status: COMPLETED | OUTPATIENT
Start: 2023-01-01 | End: 2023-01-01

## 2023-01-01 RX ORDER — LIPASE/PROTEASE/AMYLASE 16-48-48K
2 CAPSULE,DELAYED RELEASE (ENTERIC COATED) ORAL
Qty: 360 | Refills: 0
Start: 2023-01-01 | End: 2023-01-01

## 2023-01-01 RX ORDER — SODIUM CHLORIDE 9 MG/ML
1000 INJECTION, SOLUTION INTRAVENOUS
Refills: 0 | Status: DISCONTINUED | OUTPATIENT
Start: 2023-01-01 | End: 2023-01-01

## 2023-01-01 RX ORDER — HYDRALAZINE HCL 50 MG
10 TABLET ORAL EVERY 4 HOURS
Refills: 0 | Status: DISCONTINUED | OUTPATIENT
Start: 2023-01-01 | End: 2023-01-01

## 2023-01-01 RX ORDER — POTASSIUM CHLORIDE 20 MEQ
10 PACKET (EA) ORAL
Refills: 0 | Status: COMPLETED | OUTPATIENT
Start: 2023-01-01 | End: 2023-01-01

## 2023-01-01 RX ORDER — SODIUM CHLORIDE 9 MG/ML
1000 INJECTION, SOLUTION INTRAVENOUS ONCE
Refills: 0 | Status: COMPLETED | OUTPATIENT
Start: 2023-01-01 | End: 2023-01-01

## 2023-01-01 RX ORDER — BUDESONIDE AND FORMOTEROL FUMARATE DIHYDRATE 160; 4.5 UG/1; UG/1
2 AEROSOL RESPIRATORY (INHALATION)
Refills: 0 | Status: DISCONTINUED | OUTPATIENT
Start: 2023-01-01 | End: 2023-01-01

## 2023-01-01 RX ORDER — FUROSEMIDE 40 MG
20 TABLET ORAL ONCE
Refills: 0 | Status: COMPLETED | OUTPATIENT
Start: 2023-01-01 | End: 2023-01-01

## 2023-01-01 RX ORDER — AMLODIPINE BESYLATE 2.5 MG/1
10 TABLET ORAL DAILY
Refills: 0 | Status: DISCONTINUED | OUTPATIENT
Start: 2023-01-01 | End: 2023-01-01

## 2023-01-01 RX ORDER — HYDROMORPHONE HYDROCHLORIDE 2 MG/ML
0.5 INJECTION INTRAMUSCULAR; INTRAVENOUS; SUBCUTANEOUS
Refills: 0 | Status: DISCONTINUED | OUTPATIENT
Start: 2023-01-01 | End: 2023-01-01

## 2023-01-01 RX ORDER — PANTOPRAZOLE 40 MG/1
40 TABLET, DELAYED RELEASE ORAL
Refills: 0 | Status: DISCONTINUED | COMMUNITY
End: 2023-01-01

## 2023-01-01 RX ORDER — MAGNESIUM SULFATE 500 MG/ML
1 VIAL (ML) INJECTION ONCE
Refills: 0 | Status: COMPLETED | OUTPATIENT
Start: 2023-01-01 | End: 2023-01-01

## 2023-01-01 RX ORDER — FUROSEMIDE 40 MG
1 TABLET ORAL
Qty: 5 | Refills: 0
Start: 2023-01-01 | End: 2023-01-01

## 2023-01-01 RX ORDER — POTASSIUM PHOSPHATE, MONOBASIC POTASSIUM PHOSPHATE, DIBASIC 236; 224 MG/ML; MG/ML
15 INJECTION, SOLUTION INTRAVENOUS ONCE
Refills: 0 | Status: COMPLETED | OUTPATIENT
Start: 2023-01-01 | End: 2023-01-01

## 2023-01-01 RX ORDER — ERGOCALCIFEROL 1.25 MG/1
0 CAPSULE ORAL
Refills: 0 | DISCHARGE

## 2023-01-01 RX ORDER — LIPASE/PROTEASE/AMYLASE 16-48-48K
2 CAPSULE,DELAYED RELEASE (ENTERIC COATED) ORAL
Refills: 0 | Status: DISCONTINUED | OUTPATIENT
Start: 2023-01-01 | End: 2023-01-01

## 2023-01-01 RX ORDER — INFLUENZA VIRUS VACCINE 15; 15; 15; 15 UG/.5ML; UG/.5ML; UG/.5ML; UG/.5ML
0.5 SUSPENSION INTRAMUSCULAR ONCE
Refills: 0 | Status: DISCONTINUED | OUTPATIENT
Start: 2023-01-01 | End: 2023-01-01

## 2023-01-01 RX ORDER — SODIUM,POTASSIUM PHOSPHATES 278-250MG
1 POWDER IN PACKET (EA) ORAL EVERY 4 HOURS
Refills: 0 | Status: COMPLETED | OUTPATIENT
Start: 2023-01-01 | End: 2023-01-01

## 2023-01-01 RX ORDER — POTASSIUM CHLORIDE 20 MEQ
40 PACKET (EA) ORAL DAILY
Refills: 0 | Status: DISCONTINUED | OUTPATIENT
Start: 2023-01-01 | End: 2023-01-01

## 2023-01-01 RX ORDER — DEXTROSE MONOHYDRATE, SODIUM CHLORIDE, AND POTASSIUM CHLORIDE 50; .745; 4.5 G/1000ML; G/1000ML; G/1000ML
1000 INJECTION, SOLUTION INTRAVENOUS
Refills: 0 | Status: DISCONTINUED | OUTPATIENT
Start: 2023-01-01 | End: 2023-01-01

## 2023-01-01 RX ORDER — HYDRALAZINE HCL 50 MG
10 TABLET ORAL ONCE
Refills: 0 | Status: COMPLETED | OUTPATIENT
Start: 2023-01-01 | End: 2023-01-01

## 2023-01-01 RX ORDER — ALBUTEROL 90 UG/1
2 AEROSOL, METERED ORAL
Refills: 0 | DISCHARGE

## 2023-01-01 RX ORDER — LIPASE/PROTEASE/AMYLASE 16-48-48K
2 CAPSULE,DELAYED RELEASE (ENTERIC COATED) ORAL
Qty: 360 | Refills: 0 | DISCHARGE
Start: 2023-01-01 | End: 2023-01-01

## 2023-01-01 RX ORDER — NALOXONE HYDROCHLORIDE 4 MG/.1ML
0.1 SPRAY NASAL
Refills: 0 | Status: DISCONTINUED | OUTPATIENT
Start: 2023-01-01 | End: 2023-01-01

## 2023-01-01 RX ORDER — SODIUM,POTASSIUM PHOSPHATES 278-250MG
1 POWDER IN PACKET (EA) ORAL ONCE
Refills: 0 | Status: COMPLETED | OUTPATIENT
Start: 2023-01-01 | End: 2023-01-01

## 2023-01-01 RX ORDER — POTASSIUM CHLORIDE 1500 MG/1
20 TABLET, FILM COATED, EXTENDED RELEASE ORAL
Qty: 6 | Refills: 0 | Status: DISCONTINUED | COMMUNITY
Start: 2022-11-08 | End: 2023-01-01

## 2023-01-01 RX ORDER — PROCHLORPERAZINE MALEATE 10 MG/1
10 TABLET ORAL
Qty: 30 | Refills: 2 | Status: COMPLETED | COMMUNITY
Start: 2022-10-21 | End: 2023-01-01

## 2023-01-01 RX ORDER — HYDROMORPHONE HYDROCHLORIDE 2 MG/ML
30 INJECTION INTRAMUSCULAR; INTRAVENOUS; SUBCUTANEOUS
Refills: 0 | Status: DISCONTINUED | OUTPATIENT
Start: 2023-01-01 | End: 2023-01-01

## 2023-01-01 RX ORDER — SODIUM,POTASSIUM PHOSPHATES 278-250MG
1 POWDER IN PACKET (EA) ORAL
Refills: 0 | Status: COMPLETED | OUTPATIENT
Start: 2023-01-01 | End: 2023-01-01

## 2023-01-01 RX ORDER — DEXTROSE 50 % IN WATER 50 %
25 SYRINGE (ML) INTRAVENOUS ONCE
Refills: 0 | Status: DISCONTINUED | OUTPATIENT
Start: 2023-01-01 | End: 2023-01-01

## 2023-01-01 RX ORDER — ONDANSETRON 8 MG/1
4 TABLET, FILM COATED ORAL ONCE
Refills: 0 | Status: DISCONTINUED | OUTPATIENT
Start: 2023-01-01 | End: 2023-01-01

## 2023-01-01 RX ORDER — SENNA PLUS 8.6 MG/1
1 TABLET ORAL
Refills: 0 | DISCHARGE

## 2023-01-01 RX ORDER — PSEUDOEPHEDRINE HCL 30 MG
0 TABLET ORAL
Refills: 0 | DISCHARGE

## 2023-01-01 RX ORDER — POTASSIUM CHLORIDE 1500 MG/1
20 TABLET, FILM COATED, EXTENDED RELEASE ORAL TWICE DAILY
Qty: 10 | Refills: 0 | Status: DISCONTINUED | COMMUNITY
Start: 2022-11-29 | End: 2023-01-01

## 2023-01-01 RX ORDER — AMLODIPINE BESYLATE 2.5 MG/1
5 TABLET ORAL ONCE
Refills: 0 | Status: COMPLETED | OUTPATIENT
Start: 2023-01-01 | End: 2023-01-01

## 2023-01-01 RX ORDER — ACETAMINOPHEN 500 MG
650 TABLET ORAL EVERY 6 HOURS
Refills: 0 | Status: DISCONTINUED | OUTPATIENT
Start: 2023-01-01 | End: 2023-01-01

## 2023-01-01 RX ORDER — IBUPROFEN 200 MG
600 TABLET ORAL EVERY 6 HOURS
Refills: 0 | Status: DISCONTINUED | OUTPATIENT
Start: 2023-01-01 | End: 2023-01-01

## 2023-01-01 RX ORDER — GABAPENTIN 400 MG/1
1 CAPSULE ORAL
Qty: 21 | Refills: 0
Start: 2023-01-01 | End: 2023-01-01

## 2023-01-01 RX ORDER — MEROPENEM 1 G/30ML
1000 INJECTION INTRAVENOUS EVERY 8 HOURS
Refills: 0 | Status: DISCONTINUED | OUTPATIENT
Start: 2023-01-01 | End: 2023-01-01

## 2023-01-01 RX ORDER — POTASSIUM CHLORIDE 20 MEQ
20 PACKET (EA) ORAL
Refills: 0 | Status: COMPLETED | OUTPATIENT
Start: 2023-01-01 | End: 2023-01-01

## 2023-01-01 RX ORDER — PANTOPRAZOLE SODIUM 20 MG/1
40 TABLET, DELAYED RELEASE ORAL DAILY
Refills: 0 | Status: DISCONTINUED | OUTPATIENT
Start: 2023-01-01 | End: 2023-01-01

## 2023-01-01 RX ORDER — AMLODIPINE BESYLATE 2.5 MG/1
1 TABLET ORAL
Qty: 14 | Refills: 0
Start: 2023-01-01 | End: 2023-01-01

## 2023-01-01 RX ORDER — SENNA PLUS 8.6 MG/1
2 TABLET ORAL
Qty: 14 | Refills: 0
Start: 2023-01-01 | End: 2023-01-01

## 2023-01-01 RX ORDER — SENNA PLUS 8.6 MG/1
2 TABLET ORAL AT BEDTIME
Refills: 0 | Status: DISCONTINUED | OUTPATIENT
Start: 2023-01-01 | End: 2023-01-01

## 2023-01-01 RX ORDER — POTASSIUM CHLORIDE 20 MEQ
10 PACKET (EA) ORAL
Refills: 0 | Status: DISCONTINUED | OUTPATIENT
Start: 2023-01-01 | End: 2023-01-01

## 2023-01-01 RX ORDER — OXYCODONE HYDROCHLORIDE 5 MG/1
5 TABLET ORAL EVERY 4 HOURS
Refills: 0 | Status: DISCONTINUED | OUTPATIENT
Start: 2023-01-01 | End: 2023-01-01

## 2023-01-01 RX ORDER — AMLODIPINE BESYLATE 2.5 MG/1
5 TABLET ORAL DAILY
Refills: 0 | Status: DISCONTINUED | OUTPATIENT
Start: 2023-01-01 | End: 2023-01-01

## 2023-01-01 RX ORDER — ONDANSETRON 8 MG/1
8 TABLET ORAL EVERY 8 HOURS
Qty: 30 | Refills: 2 | Status: COMPLETED | COMMUNITY
Start: 2022-10-21 | End: 2023-01-01

## 2023-01-01 RX ORDER — AMLODIPINE BESYLATE 10 MG/1
10 TABLET ORAL DAILY
Qty: 30 | Refills: 0 | Status: ACTIVE | COMMUNITY
Start: 2023-01-01 | End: 1900-01-01

## 2023-01-01 RX ORDER — LABETALOL HCL 100 MG
200 TABLET ORAL EVERY 12 HOURS
Refills: 0 | Status: DISCONTINUED | OUTPATIENT
Start: 2023-01-01 | End: 2023-01-01

## 2023-01-01 RX ORDER — PROCHLORPERAZINE MALEATE 5 MG
1 TABLET ORAL
Refills: 0 | DISCHARGE

## 2023-01-01 RX ORDER — INSULIN LISPRO 100/ML
VIAL (ML) SUBCUTANEOUS EVERY 6 HOURS
Refills: 0 | Status: DISCONTINUED | OUTPATIENT
Start: 2023-01-01 | End: 2023-01-01

## 2023-01-01 RX ORDER — IBUPROFEN 200 MG
600 TABLET ORAL EVERY 6 HOURS
Refills: 0 | Status: COMPLETED | OUTPATIENT
Start: 2023-01-01 | End: 2024-08-23

## 2023-01-01 RX ORDER — METRONIDAZOLE 500 MG
500 TABLET ORAL EVERY 8 HOURS
Refills: 0 | Status: DISCONTINUED | OUTPATIENT
Start: 2023-01-01 | End: 2023-01-01

## 2023-01-01 RX ORDER — ONDANSETRON 8 MG/1
8 TABLET ORAL 3 TIMES DAILY
Qty: 30 | Refills: 3 | Status: ACTIVE | COMMUNITY
Start: 2022-10-21 | End: 1900-01-01

## 2023-01-01 RX ORDER — MORPHINE SULFATE 50 MG/1
2 CAPSULE, EXTENDED RELEASE ORAL EVERY 4 HOURS
Refills: 0 | Status: DISCONTINUED | OUTPATIENT
Start: 2023-01-01 | End: 2023-01-01

## 2023-01-01 RX ORDER — LIPASE/PROTEASE/AMYLASE 16-48-48K
1 CAPSULE,DELAYED RELEASE (ENTERIC COATED) ORAL
Refills: 0 | Status: DISCONTINUED | OUTPATIENT
Start: 2023-01-01 | End: 2023-01-01

## 2023-01-01 RX ORDER — HYDROMORPHONE HYDROCHLORIDE 2 MG/ML
0.25 INJECTION INTRAMUSCULAR; INTRAVENOUS; SUBCUTANEOUS
Refills: 0 | Status: DISCONTINUED | OUTPATIENT
Start: 2023-01-01 | End: 2023-01-01

## 2023-01-01 RX ORDER — GABAPENTIN 400 MG/1
100 CAPSULE ORAL THREE TIMES A DAY
Refills: 0 | Status: DISCONTINUED | OUTPATIENT
Start: 2023-01-01 | End: 2023-01-01

## 2023-01-01 RX ORDER — LABETALOL HCL 100 MG
10 TABLET ORAL ONCE
Refills: 0 | Status: COMPLETED | OUTPATIENT
Start: 2023-01-01 | End: 2023-01-01

## 2023-01-01 RX ORDER — POTASSIUM CHLORIDE 20 MEQ
40 PACKET (EA) ORAL ONCE
Refills: 0 | Status: COMPLETED | OUTPATIENT
Start: 2023-01-01 | End: 2023-01-01

## 2023-01-01 RX ORDER — ERTAPENEM SODIUM 1 G/1
1000 INJECTION, POWDER, LYOPHILIZED, FOR SOLUTION INTRAMUSCULAR; INTRAVENOUS ONCE
Refills: 0 | Status: COMPLETED | OUTPATIENT
Start: 2023-01-01 | End: 2023-01-01

## 2023-01-01 RX ORDER — KETOROLAC TROMETHAMINE 30 MG/ML
15 SYRINGE (ML) INJECTION ONCE
Refills: 0 | Status: DISCONTINUED | OUTPATIENT
Start: 2023-01-01 | End: 2023-01-01

## 2023-01-01 RX ORDER — LIPASE/PROTEASE/AMYLASE 16-48-48K
2 CAPSULE,DELAYED RELEASE (ENTERIC COATED) ORAL
Qty: 180 | Refills: 0
Start: 2023-01-01 | End: 2023-01-01

## 2023-01-01 RX ORDER — ONDANSETRON 8 MG/1
4 TABLET, FILM COATED ORAL EVERY 6 HOURS
Refills: 0 | Status: DISCONTINUED | OUTPATIENT
Start: 2023-01-01 | End: 2023-01-01

## 2023-01-01 RX ORDER — PANTOPRAZOLE 40 MG/1
40 TABLET, DELAYED RELEASE ORAL
Qty: 30 | Refills: 0 | Status: ACTIVE | COMMUNITY
Start: 2023-01-01 | End: 1900-01-01

## 2023-01-01 RX ORDER — POTASSIUM CHLORIDE 20 MEQ
20 PACKET (EA) ORAL
Refills: 0 | Status: DISCONTINUED | OUTPATIENT
Start: 2023-01-01 | End: 2023-01-01

## 2023-01-01 RX ORDER — INSULIN LISPRO 100/ML
VIAL (ML) SUBCUTANEOUS
Refills: 0 | Status: DISCONTINUED | OUTPATIENT
Start: 2023-01-01 | End: 2023-01-01

## 2023-01-01 RX ORDER — PANTOPRAZOLE SODIUM 20 MG/1
1 TABLET, DELAYED RELEASE ORAL
Qty: 30 | Refills: 0
Start: 2023-01-01 | End: 2023-01-01

## 2023-01-01 RX ORDER — ASPIRIN 325 MG/1
325 TABLET, FILM COATED ORAL
Qty: 30 | Refills: 0 | Status: DISCONTINUED | COMMUNITY
Start: 2023-01-01 | End: 2023-01-01

## 2023-01-01 RX ORDER — GABAPENTIN 400 MG/1
1 CAPSULE ORAL
Qty: 21 | Refills: 0 | DISCHARGE
Start: 2023-01-01 | End: 2023-01-01

## 2023-01-01 RX ORDER — ONDANSETRON 8 MG/1
1 TABLET, FILM COATED ORAL
Refills: 0 | DISCHARGE

## 2023-01-01 RX ORDER — MAGNESIUM SULFATE 500 MG/ML
2 VIAL (ML) INJECTION ONCE
Refills: 0 | Status: COMPLETED | OUTPATIENT
Start: 2023-01-01 | End: 2023-01-01

## 2023-01-01 RX ORDER — PANTOPRAZOLE SODIUM 20 MG/1
40 TABLET, DELAYED RELEASE ORAL
Refills: 0 | Status: DISCONTINUED | OUTPATIENT
Start: 2023-01-01 | End: 2023-01-01

## 2023-01-01 RX ORDER — ALBUTEROL 90 UG/1
2 AEROSOL, METERED ORAL EVERY 6 HOURS
Refills: 0 | Status: DISCONTINUED | OUTPATIENT
Start: 2023-01-01 | End: 2023-01-01

## 2023-01-01 RX ORDER — GLUCAGON INJECTION, SOLUTION 0.5 MG/.1ML
1 INJECTION, SOLUTION SUBCUTANEOUS ONCE
Refills: 0 | Status: DISCONTINUED | OUTPATIENT
Start: 2023-01-01 | End: 2023-01-01

## 2023-01-01 RX ORDER — ACETAMINOPHEN 500 MG
975 TABLET ORAL EVERY 6 HOURS
Refills: 0 | Status: DISCONTINUED | OUTPATIENT
Start: 2023-01-01 | End: 2023-01-01

## 2023-01-01 RX ORDER — ONDANSETRON 8 MG/1
4 TABLET, FILM COATED ORAL ONCE
Refills: 0 | Status: COMPLETED | OUTPATIENT
Start: 2023-01-01 | End: 2023-01-01

## 2023-01-01 RX ORDER — HEPARIN SODIUM 5000 [USP'U]/ML
5000 INJECTION INTRAVENOUS; SUBCUTANEOUS EVERY 8 HOURS
Refills: 0 | Status: DISCONTINUED | OUTPATIENT
Start: 2023-01-01 | End: 2023-01-01

## 2023-01-01 RX ORDER — PIPERACILLIN AND TAZOBACTAM 4; .5 G/20ML; G/20ML
3.38 INJECTION, POWDER, LYOPHILIZED, FOR SOLUTION INTRAVENOUS ONCE
Refills: 0 | Status: DISCONTINUED | OUTPATIENT
Start: 2023-01-01 | End: 2023-01-01

## 2023-01-01 RX ORDER — DEXTROSE 50 % IN WATER 50 %
15 SYRINGE (ML) INTRAVENOUS ONCE
Refills: 0 | Status: DISCONTINUED | OUTPATIENT
Start: 2023-01-01 | End: 2023-01-01

## 2023-01-01 RX ORDER — DEXTROSE 50 % IN WATER 50 %
12.5 SYRINGE (ML) INTRAVENOUS ONCE
Refills: 0 | Status: DISCONTINUED | OUTPATIENT
Start: 2023-01-01 | End: 2023-01-01

## 2023-01-01 RX ORDER — TIOTROPIUM BROMIDE 18 UG/1
2 CAPSULE ORAL; RESPIRATORY (INHALATION) DAILY
Refills: 0 | Status: DISCONTINUED | OUTPATIENT
Start: 2023-01-01 | End: 2023-01-01

## 2023-01-01 RX ORDER — PANCRELIPASE 120000; 24000; 76000 [USP'U]/1; [USP'U]/1; [USP'U]/1
24000-76000 CAPSULE, DELAYED RELEASE PELLETS ORAL
Qty: 150 | Refills: 5 | Status: DISCONTINUED | COMMUNITY
Start: 2022-11-04 | End: 2023-01-01

## 2023-01-01 RX ORDER — SODIUM,POTASSIUM PHOSPHATES 278-250MG
1 POWDER IN PACKET (EA) ORAL ONCE
Refills: 0 | Status: DISCONTINUED | OUTPATIENT
Start: 2023-01-01 | End: 2023-01-01

## 2023-01-01 RX ORDER — MULTIVIT-MIN/FERROUS GLUCONATE 9 MG/15 ML
1 LIQUID (ML) ORAL DAILY
Refills: 0 | Status: DISCONTINUED | OUTPATIENT
Start: 2023-01-01 | End: 2023-01-01

## 2023-01-01 RX ORDER — ERTAPENEM SODIUM 1 G/1
1 INJECTION, POWDER, LYOPHILIZED, FOR SOLUTION INTRAMUSCULAR; INTRAVENOUS
Qty: 10 | Refills: 0
Start: 2023-01-01 | End: 2023-01-01

## 2023-01-01 RX ORDER — PIPERACILLIN AND TAZOBACTAM 4; .5 G/20ML; G/20ML
3.38 INJECTION, POWDER, LYOPHILIZED, FOR SOLUTION INTRAVENOUS EVERY 8 HOURS
Refills: 0 | Status: DISCONTINUED | OUTPATIENT
Start: 2023-01-01 | End: 2023-01-01

## 2023-01-01 RX ORDER — HYDROMORPHONE HYDROCHLORIDE 2 MG/ML
250 INJECTION INTRAMUSCULAR; INTRAVENOUS; SUBCUTANEOUS
Refills: 0 | Status: DISCONTINUED | OUTPATIENT
Start: 2023-01-01 | End: 2023-01-01

## 2023-01-01 RX ORDER — ACETAMINOPHEN 500 MG
750 TABLET ORAL EVERY 6 HOURS
Refills: 0 | Status: COMPLETED | OUTPATIENT
Start: 2023-01-01 | End: 2023-01-01

## 2023-01-01 RX ORDER — INFLUENZA VIRUS VACCINE 15; 15; 15; 15 UG/.5ML; UG/.5ML; UG/.5ML; UG/.5ML
0.5 SUSPENSION INTRAMUSCULAR ONCE
Refills: 0 | Status: COMPLETED | OUTPATIENT
Start: 2023-01-01 | End: 2023-01-01

## 2023-01-01 RX ORDER — PROCHLORPERAZINE MALEATE 10 MG/1
10 TABLET ORAL EVERY 8 HOURS
Qty: 30 | Refills: 3 | Status: ACTIVE | COMMUNITY
Start: 2022-10-21 | End: 1900-01-01

## 2023-01-01 RX ORDER — POTASSIUM CHLORIDE 20 MEQ
1 PACKET (EA) ORAL
Qty: 30 | Refills: 0
Start: 2023-01-01 | End: 2023-01-01

## 2023-01-01 RX ORDER — RIVAROXABAN 15 MG/1
15 TABLET, FILM COATED ORAL
Qty: 14 | Refills: 0 | Status: ACTIVE | COMMUNITY
Start: 2023-01-01 | End: 1900-01-01

## 2023-01-01 RX ORDER — ACETAMINOPHEN 500 MG
1000 TABLET ORAL EVERY 6 HOURS
Refills: 0 | Status: DISCONTINUED | OUTPATIENT
Start: 2023-01-01 | End: 2023-01-01

## 2023-01-01 RX ORDER — OXYCODONE HYDROCHLORIDE 5 MG/1
2.5 TABLET ORAL EVERY 4 HOURS
Refills: 0 | Status: DISCONTINUED | OUTPATIENT
Start: 2023-01-01 | End: 2023-01-01

## 2023-01-01 RX ORDER — PANCRELIPASE 60000; 12000; 38000 [USP'U]/1; [USP'U]/1; [USP'U]/1
12000-38000 CAPSULE, DELAYED RELEASE PELLETS ORAL
Qty: 180 | Refills: 0 | Status: ACTIVE | COMMUNITY
Start: 2023-01-01 | End: 1900-01-01

## 2023-01-01 RX ORDER — ACETAMINOPHEN 500 MG
750 TABLET ORAL ONCE
Refills: 0 | Status: COMPLETED | OUTPATIENT
Start: 2023-01-01 | End: 2023-01-01

## 2023-01-01 RX ORDER — THIAMINE MONONITRATE (VIT B1) 100 MG
100 TABLET ORAL DAILY
Refills: 0 | Status: DISCONTINUED | OUTPATIENT
Start: 2023-01-01 | End: 2023-01-01

## 2023-01-01 RX ADMIN — HYDROMORPHONE HYDROCHLORIDE 0.5 MILLIGRAM(S): 2 INJECTION INTRAMUSCULAR; INTRAVENOUS; SUBCUTANEOUS at 16:26

## 2023-01-01 RX ADMIN — SODIUM CHLORIDE 75 MILLILITER(S): 9 INJECTION, SOLUTION INTRAVENOUS at 21:24

## 2023-01-01 RX ADMIN — GABAPENTIN 100 MILLIGRAM(S): 400 CAPSULE ORAL at 14:25

## 2023-01-01 RX ADMIN — Medication 2 CAPSULE(S): at 17:37

## 2023-01-01 RX ADMIN — GABAPENTIN 100 MILLIGRAM(S): 400 CAPSULE ORAL at 05:59

## 2023-01-01 RX ADMIN — PANTOPRAZOLE SODIUM 40 MILLIGRAM(S): 20 TABLET, DELAYED RELEASE ORAL at 09:44

## 2023-01-01 RX ADMIN — Medication 100 MILLIEQUIVALENT(S): at 12:34

## 2023-01-01 RX ADMIN — Medication 10 MILLIGRAM(S): at 18:26

## 2023-01-01 RX ADMIN — HYDROMORPHONE HYDROCHLORIDE 30 MILLILITER(S): 2 INJECTION INTRAMUSCULAR; INTRAVENOUS; SUBCUTANEOUS at 09:28

## 2023-01-01 RX ADMIN — GABAPENTIN 100 MILLIGRAM(S): 400 CAPSULE ORAL at 23:58

## 2023-01-01 RX ADMIN — AMLODIPINE BESYLATE 5 MILLIGRAM(S): 2.5 TABLET ORAL at 12:43

## 2023-01-01 RX ADMIN — Medication 100 MILLIEQUIVALENT(S): at 11:05

## 2023-01-01 RX ADMIN — AMLODIPINE BESYLATE 10 MILLIGRAM(S): 2.5 TABLET ORAL at 09:10

## 2023-01-01 RX ADMIN — MEROPENEM 1000 MILLIGRAM(S): 1 INJECTION INTRAVENOUS at 14:49

## 2023-01-01 RX ADMIN — Medication 1 TABLET(S): at 10:31

## 2023-01-01 RX ADMIN — Medication 1 TABLET(S): at 08:35

## 2023-01-01 RX ADMIN — Medication 20 MILLIEQUIVALENT(S): at 12:31

## 2023-01-01 RX ADMIN — PANTOPRAZOLE SODIUM 40 MILLIGRAM(S): 20 TABLET, DELAYED RELEASE ORAL at 10:31

## 2023-01-01 RX ADMIN — Medication 1 PACKET(S): at 12:18

## 2023-01-01 RX ADMIN — BUDESONIDE AND FORMOTEROL FUMARATE DIHYDRATE 2 PUFF(S): 160; 4.5 AEROSOL RESPIRATORY (INHALATION) at 20:34

## 2023-01-01 RX ADMIN — HEPARIN SODIUM 5000 UNIT(S): 5000 INJECTION INTRAVENOUS; SUBCUTANEOUS at 21:50

## 2023-01-01 RX ADMIN — PANTOPRAZOLE SODIUM 40 MILLIGRAM(S): 20 TABLET, DELAYED RELEASE ORAL at 09:10

## 2023-01-01 RX ADMIN — HEPARIN SODIUM 5000 UNIT(S): 5000 INJECTION INTRAVENOUS; SUBCUTANEOUS at 22:55

## 2023-01-01 RX ADMIN — TIOTROPIUM BROMIDE 2 PUFF(S): 18 CAPSULE ORAL; RESPIRATORY (INHALATION) at 08:50

## 2023-01-01 RX ADMIN — Medication 1000 MILLIGRAM(S): at 00:35

## 2023-01-01 RX ADMIN — Medication 400 MILLIGRAM(S): at 17:40

## 2023-01-01 RX ADMIN — Medication 975 MILLIGRAM(S): at 17:10

## 2023-01-01 RX ADMIN — GABAPENTIN 100 MILLIGRAM(S): 400 CAPSULE ORAL at 21:49

## 2023-01-01 RX ADMIN — Medication 975 MILLIGRAM(S): at 23:29

## 2023-01-01 RX ADMIN — HYDROMORPHONE HYDROCHLORIDE 30 MILLILITER(S): 2 INJECTION INTRAMUSCULAR; INTRAVENOUS; SUBCUTANEOUS at 07:20

## 2023-01-01 RX ADMIN — HYDROMORPHONE HYDROCHLORIDE 250 MILLILITER(S): 2 INJECTION INTRAMUSCULAR; INTRAVENOUS; SUBCUTANEOUS at 15:32

## 2023-01-01 RX ADMIN — Medication 2 CAPSULE(S): at 12:43

## 2023-01-01 RX ADMIN — Medication 975 MILLIGRAM(S): at 06:12

## 2023-01-01 RX ADMIN — PANTOPRAZOLE SODIUM 40 MILLIGRAM(S): 20 TABLET, DELAYED RELEASE ORAL at 05:53

## 2023-01-01 RX ADMIN — GABAPENTIN 100 MILLIGRAM(S): 400 CAPSULE ORAL at 21:26

## 2023-01-01 RX ADMIN — GABAPENTIN 100 MILLIGRAM(S): 400 CAPSULE ORAL at 05:52

## 2023-01-01 RX ADMIN — Medication 650 MILLIGRAM(S): at 21:38

## 2023-01-01 RX ADMIN — HEPARIN SODIUM 5000 UNIT(S): 5000 INJECTION INTRAVENOUS; SUBCUTANEOUS at 05:30

## 2023-01-01 RX ADMIN — BUDESONIDE AND FORMOTEROL FUMARATE DIHYDRATE 2 PUFF(S): 160; 4.5 AEROSOL RESPIRATORY (INHALATION) at 20:37

## 2023-01-01 RX ADMIN — Medication 1 PACKET(S): at 14:46

## 2023-01-01 RX ADMIN — MEROPENEM 1000 MILLIGRAM(S): 1 INJECTION INTRAVENOUS at 05:14

## 2023-01-01 RX ADMIN — Medication 2 CAPSULE(S): at 17:16

## 2023-01-01 RX ADMIN — HEPARIN SODIUM 5000 UNIT(S): 5000 INJECTION INTRAVENOUS; SUBCUTANEOUS at 14:12

## 2023-01-01 RX ADMIN — Medication 25 GRAM(S): at 17:20

## 2023-01-01 RX ADMIN — Medication 300 MILLIGRAM(S): at 18:58

## 2023-01-01 RX ADMIN — Medication 100 MILLIEQUIVALENT(S): at 08:51

## 2023-01-01 RX ADMIN — TIOTROPIUM BROMIDE 2 PUFF(S): 18 CAPSULE ORAL; RESPIRATORY (INHALATION) at 09:01

## 2023-01-01 RX ADMIN — AMLODIPINE BESYLATE 10 MILLIGRAM(S): 2.5 TABLET ORAL at 10:31

## 2023-01-01 RX ADMIN — PANTOPRAZOLE SODIUM 40 MILLIGRAM(S): 20 TABLET, DELAYED RELEASE ORAL at 08:37

## 2023-01-01 RX ADMIN — Medication 400 MILLIGRAM(S): at 11:30

## 2023-01-01 RX ADMIN — DEXTROSE MONOHYDRATE, SODIUM CHLORIDE, AND POTASSIUM CHLORIDE 84 MILLILITER(S): 50; .745; 4.5 INJECTION, SOLUTION INTRAVENOUS at 05:25

## 2023-01-01 RX ADMIN — GABAPENTIN 100 MILLIGRAM(S): 400 CAPSULE ORAL at 21:17

## 2023-01-01 RX ADMIN — ONDANSETRON 4 MILLIGRAM(S): 8 TABLET, FILM COATED ORAL at 16:50

## 2023-01-01 RX ADMIN — Medication 300 MILLIGRAM(S): at 12:15

## 2023-01-01 RX ADMIN — Medication 1 CAPSULE(S): at 11:36

## 2023-01-01 RX ADMIN — GABAPENTIN 100 MILLIGRAM(S): 400 CAPSULE ORAL at 17:15

## 2023-01-01 RX ADMIN — GABAPENTIN 100 MILLIGRAM(S): 400 CAPSULE ORAL at 05:32

## 2023-01-01 RX ADMIN — Medication 300 MILLIGRAM(S): at 05:09

## 2023-01-01 RX ADMIN — Medication 2 CAPSULE(S): at 17:28

## 2023-01-01 RX ADMIN — MEROPENEM 1000 MILLIGRAM(S): 1 INJECTION INTRAVENOUS at 05:32

## 2023-01-01 RX ADMIN — Medication 40 MILLIEQUIVALENT(S): at 12:18

## 2023-01-01 RX ADMIN — Medication 40 MILLIEQUIVALENT(S): at 09:40

## 2023-01-01 RX ADMIN — DEXTROSE MONOHYDRATE, SODIUM CHLORIDE, AND POTASSIUM CHLORIDE 42 MILLILITER(S): 50; .745; 4.5 INJECTION, SOLUTION INTRAVENOUS at 15:16

## 2023-01-01 RX ADMIN — Medication 1 CAPSULE(S): at 17:00

## 2023-01-01 RX ADMIN — BUDESONIDE AND FORMOTEROL FUMARATE DIHYDRATE 2 PUFF(S): 160; 4.5 AEROSOL RESPIRATORY (INHALATION) at 19:47

## 2023-01-01 RX ADMIN — Medication 85 MILLIMOLE(S): at 09:43

## 2023-01-01 RX ADMIN — Medication 2 CAPSULE(S): at 09:44

## 2023-01-01 RX ADMIN — Medication 100 MILLIEQUIVALENT(S): at 09:40

## 2023-01-01 RX ADMIN — MEROPENEM 1000 MILLIGRAM(S): 1 INJECTION INTRAVENOUS at 06:04

## 2023-01-01 RX ADMIN — Medication 2 CAPSULE(S): at 17:23

## 2023-01-01 RX ADMIN — MEROPENEM 1000 MILLIGRAM(S): 1 INJECTION INTRAVENOUS at 21:17

## 2023-01-01 RX ADMIN — Medication 2 CAPSULE(S): at 08:48

## 2023-01-01 RX ADMIN — MEROPENEM 1000 MILLIGRAM(S): 1 INJECTION INTRAVENOUS at 13:40

## 2023-01-01 RX ADMIN — HEPARIN SODIUM 5000 UNIT(S): 5000 INJECTION INTRAVENOUS; SUBCUTANEOUS at 21:46

## 2023-01-01 RX ADMIN — GABAPENTIN 100 MILLIGRAM(S): 400 CAPSULE ORAL at 05:14

## 2023-01-01 RX ADMIN — TIOTROPIUM BROMIDE 2 PUFF(S): 18 CAPSULE ORAL; RESPIRATORY (INHALATION) at 08:46

## 2023-01-01 RX ADMIN — GABAPENTIN 100 MILLIGRAM(S): 400 CAPSULE ORAL at 14:13

## 2023-01-01 RX ADMIN — HYDROMORPHONE HYDROCHLORIDE 0.5 MILLIGRAM(S): 2 INJECTION INTRAMUSCULAR; INTRAVENOUS; SUBCUTANEOUS at 16:50

## 2023-01-01 RX ADMIN — HYDROMORPHONE HYDROCHLORIDE 30 MILLILITER(S): 2 INJECTION INTRAMUSCULAR; INTRAVENOUS; SUBCUTANEOUS at 05:53

## 2023-01-01 RX ADMIN — PANTOPRAZOLE SODIUM 40 MILLIGRAM(S): 20 TABLET, DELAYED RELEASE ORAL at 12:47

## 2023-01-01 RX ADMIN — Medication 100 MILLIEQUIVALENT(S): at 14:56

## 2023-01-01 RX ADMIN — HYDROMORPHONE HYDROCHLORIDE 0.5 MILLIGRAM(S): 2 INJECTION INTRAMUSCULAR; INTRAVENOUS; SUBCUTANEOUS at 17:05

## 2023-01-01 RX ADMIN — Medication 20 MILLIEQUIVALENT(S): at 14:13

## 2023-01-01 RX ADMIN — SODIUM CHLORIDE 100 MILLILITER(S): 9 INJECTION, SOLUTION INTRAVENOUS at 18:30

## 2023-01-01 RX ADMIN — GABAPENTIN 100 MILLIGRAM(S): 400 CAPSULE ORAL at 05:36

## 2023-01-01 RX ADMIN — Medication 750 MILLIGRAM(S): at 22:56

## 2023-01-01 RX ADMIN — Medication 2 CAPSULE(S): at 12:00

## 2023-01-01 RX ADMIN — GABAPENTIN 100 MILLIGRAM(S): 400 CAPSULE ORAL at 07:20

## 2023-01-01 RX ADMIN — BUDESONIDE AND FORMOTEROL FUMARATE DIHYDRATE 2 PUFF(S): 160; 4.5 AEROSOL RESPIRATORY (INHALATION) at 11:56

## 2023-01-01 RX ADMIN — HEPARIN SODIUM 5000 UNIT(S): 5000 INJECTION INTRAVENOUS; SUBCUTANEOUS at 15:14

## 2023-01-01 RX ADMIN — HEPARIN SODIUM 5000 UNIT(S): 5000 INJECTION INTRAVENOUS; SUBCUTANEOUS at 05:08

## 2023-01-01 RX ADMIN — HYDROMORPHONE HYDROCHLORIDE 30 MILLILITER(S): 2 INJECTION INTRAMUSCULAR; INTRAVENOUS; SUBCUTANEOUS at 04:41

## 2023-01-01 RX ADMIN — ERTAPENEM SODIUM 120 MILLIGRAM(S): 1 INJECTION, POWDER, LYOPHILIZED, FOR SOLUTION INTRAMUSCULAR; INTRAVENOUS at 13:39

## 2023-01-01 RX ADMIN — Medication 20 MILLIEQUIVALENT(S): at 14:45

## 2023-01-01 RX ADMIN — TIOTROPIUM BROMIDE 2 PUFF(S): 18 CAPSULE ORAL; RESPIRATORY (INHALATION) at 08:28

## 2023-01-01 RX ADMIN — PANTOPRAZOLE SODIUM 40 MILLIGRAM(S): 20 TABLET, DELAYED RELEASE ORAL at 14:13

## 2023-01-01 RX ADMIN — Medication 2 CAPSULE(S): at 12:47

## 2023-01-01 RX ADMIN — HEPARIN SODIUM 5000 UNIT(S): 5000 INJECTION INTRAVENOUS; SUBCUTANEOUS at 06:12

## 2023-01-01 RX ADMIN — Medication 750 MILLIGRAM(S): at 05:09

## 2023-01-01 RX ADMIN — BUDESONIDE AND FORMOTEROL FUMARATE DIHYDRATE 2 PUFF(S): 160; 4.5 AEROSOL RESPIRATORY (INHALATION) at 08:49

## 2023-01-01 RX ADMIN — Medication 20 MILLIEQUIVALENT(S): at 17:10

## 2023-01-01 RX ADMIN — HYDROMORPHONE HYDROCHLORIDE 30 MILLILITER(S): 2 INJECTION INTRAMUSCULAR; INTRAVENOUS; SUBCUTANEOUS at 07:22

## 2023-01-01 RX ADMIN — Medication 200 MILLIGRAM(S): at 18:13

## 2023-01-01 RX ADMIN — SODIUM CHLORIDE 75 MILLILITER(S): 9 INJECTION, SOLUTION INTRAVENOUS at 09:11

## 2023-01-01 RX ADMIN — Medication 1 PACKET(S): at 12:17

## 2023-01-01 RX ADMIN — HEPARIN SODIUM 5000 UNIT(S): 5000 INJECTION INTRAVENOUS; SUBCUTANEOUS at 05:51

## 2023-01-01 RX ADMIN — PANTOPRAZOLE SODIUM 40 MILLIGRAM(S): 20 TABLET, DELAYED RELEASE ORAL at 12:34

## 2023-01-01 RX ADMIN — SODIUM CHLORIDE 1000 MILLILITER(S): 9 INJECTION, SOLUTION INTRAVENOUS at 21:45

## 2023-01-01 RX ADMIN — AMLODIPINE BESYLATE 10 MILLIGRAM(S): 2.5 TABLET ORAL at 06:12

## 2023-01-01 RX ADMIN — DEXTROSE MONOHYDRATE, SODIUM CHLORIDE, AND POTASSIUM CHLORIDE 42 MILLILITER(S): 50; .745; 4.5 INJECTION, SOLUTION INTRAVENOUS at 23:28

## 2023-01-01 RX ADMIN — GABAPENTIN 100 MILLIGRAM(S): 400 CAPSULE ORAL at 13:39

## 2023-01-01 RX ADMIN — Medication 100 GRAM(S): at 02:45

## 2023-01-01 RX ADMIN — Medication 100 GRAM(S): at 13:43

## 2023-01-01 RX ADMIN — MEROPENEM 1000 MILLIGRAM(S): 1 INJECTION INTRAVENOUS at 14:00

## 2023-01-01 RX ADMIN — PIPERACILLIN AND TAZOBACTAM 200 GRAM(S): 4; .5 INJECTION, POWDER, LYOPHILIZED, FOR SOLUTION INTRAVENOUS at 07:20

## 2023-01-01 RX ADMIN — Medication 300 MILLIGRAM(S): at 22:56

## 2023-01-01 RX ADMIN — HEPARIN SODIUM 5000 UNIT(S): 5000 INJECTION INTRAVENOUS; SUBCUTANEOUS at 13:43

## 2023-01-01 RX ADMIN — Medication 200 MILLIGRAM(S): at 05:08

## 2023-01-01 RX ADMIN — MEROPENEM 1000 MILLIGRAM(S): 1 INJECTION INTRAVENOUS at 17:15

## 2023-01-01 RX ADMIN — ONDANSETRON 4 MILLIGRAM(S): 8 TABLET, FILM COATED ORAL at 21:32

## 2023-01-01 RX ADMIN — HYDROMORPHONE HYDROCHLORIDE 30 MILLILITER(S): 2 INJECTION INTRAMUSCULAR; INTRAVENOUS; SUBCUTANEOUS at 19:10

## 2023-01-01 RX ADMIN — PANTOPRAZOLE SODIUM 40 MILLIGRAM(S): 20 TABLET, DELAYED RELEASE ORAL at 09:42

## 2023-01-01 RX ADMIN — MEROPENEM 1000 MILLIGRAM(S): 1 INJECTION INTRAVENOUS at 14:25

## 2023-01-01 RX ADMIN — DEXTROSE MONOHYDRATE, SODIUM CHLORIDE, AND POTASSIUM CHLORIDE 84 MILLILITER(S): 50; .745; 4.5 INJECTION, SOLUTION INTRAVENOUS at 09:53

## 2023-01-01 RX ADMIN — PANTOPRAZOLE SODIUM 40 MILLIGRAM(S): 20 TABLET, DELAYED RELEASE ORAL at 09:40

## 2023-01-01 RX ADMIN — HYDROMORPHONE HYDROCHLORIDE 30 MILLILITER(S): 2 INJECTION INTRAMUSCULAR; INTRAVENOUS; SUBCUTANEOUS at 19:11

## 2023-01-01 RX ADMIN — Medication 20 MILLIEQUIVALENT(S): at 17:00

## 2023-01-01 RX ADMIN — Medication 260 MILLIGRAM(S): at 21:05

## 2023-01-01 RX ADMIN — Medication 400 MILLIGRAM(S): at 05:32

## 2023-01-01 RX ADMIN — AMLODIPINE BESYLATE 10 MILLIGRAM(S): 2.5 TABLET ORAL at 12:47

## 2023-01-01 RX ADMIN — AMLODIPINE BESYLATE 5 MILLIGRAM(S): 2.5 TABLET ORAL at 05:08

## 2023-01-01 RX ADMIN — HYDROMORPHONE HYDROCHLORIDE 0.5 MILLIGRAM(S): 2 INJECTION INTRAMUSCULAR; INTRAVENOUS; SUBCUTANEOUS at 16:41

## 2023-01-01 RX ADMIN — ALBUTEROL 2 PUFF(S): 90 AEROSOL, METERED ORAL at 12:00

## 2023-01-01 RX ADMIN — HYDROMORPHONE HYDROCHLORIDE 30 MILLILITER(S): 2 INJECTION INTRAMUSCULAR; INTRAVENOUS; SUBCUTANEOUS at 07:25

## 2023-01-01 RX ADMIN — HEPARIN SODIUM 5000 UNIT(S): 5000 INJECTION INTRAVENOUS; SUBCUTANEOUS at 14:02

## 2023-01-01 RX ADMIN — GABAPENTIN 100 MILLIGRAM(S): 400 CAPSULE ORAL at 21:28

## 2023-01-01 RX ADMIN — Medication 10 MILLIGRAM(S): at 19:30

## 2023-01-01 RX ADMIN — INFLUENZA VIRUS VACCINE 0.5 MILLILITER(S): 15; 15; 15; 15 SUSPENSION INTRAMUSCULAR at 16:35

## 2023-01-01 RX ADMIN — Medication 10 MILLIGRAM(S): at 04:09

## 2023-01-01 RX ADMIN — Medication 10 MILLIGRAM(S): at 17:28

## 2023-01-01 RX ADMIN — Medication 650 MILLIGRAM(S): at 22:09

## 2023-01-01 RX ADMIN — Medication 1 PACKET(S): at 16:33

## 2023-01-01 RX ADMIN — Medication 400 MILLIGRAM(S): at 05:25

## 2023-01-01 RX ADMIN — Medication 10 MILLIGRAM(S): at 06:23

## 2023-01-01 RX ADMIN — BUDESONIDE AND FORMOTEROL FUMARATE DIHYDRATE 2 PUFF(S): 160; 4.5 AEROSOL RESPIRATORY (INHALATION) at 20:15

## 2023-01-01 RX ADMIN — Medication 1000 MILLIGRAM(S): at 05:25

## 2023-01-01 RX ADMIN — Medication 400 MILLIGRAM(S): at 23:35

## 2023-01-01 RX ADMIN — GABAPENTIN 100 MILLIGRAM(S): 400 CAPSULE ORAL at 14:01

## 2023-01-01 RX ADMIN — SODIUM CHLORIDE 500 MILLILITER(S): 9 INJECTION, SOLUTION INTRAVENOUS at 21:09

## 2023-01-01 RX ADMIN — POTASSIUM PHOSPHATE, MONOBASIC POTASSIUM PHOSPHATE, DIBASIC 62.5 MILLIMOLE(S): 236; 224 INJECTION, SOLUTION INTRAVENOUS at 03:15

## 2023-01-01 RX ADMIN — Medication 1: at 11:28

## 2023-01-01 RX ADMIN — Medication 20 MILLIEQUIVALENT(S): at 11:36

## 2023-01-01 RX ADMIN — BUDESONIDE AND FORMOTEROL FUMARATE DIHYDRATE 2 PUFF(S): 160; 4.5 AEROSOL RESPIRATORY (INHALATION) at 09:00

## 2023-01-01 RX ADMIN — BUDESONIDE AND FORMOTEROL FUMARATE DIHYDRATE 2 PUFF(S): 160; 4.5 AEROSOL RESPIRATORY (INHALATION) at 08:28

## 2023-01-01 RX ADMIN — SODIUM CHLORIDE 75 MILLILITER(S): 9 INJECTION, SOLUTION INTRAVENOUS at 23:04

## 2023-01-01 RX ADMIN — Medication 2 CAPSULE(S): at 09:10

## 2023-01-01 RX ADMIN — HYDROMORPHONE HYDROCHLORIDE 30 MILLILITER(S): 2 INJECTION INTRAMUSCULAR; INTRAVENOUS; SUBCUTANEOUS at 17:36

## 2023-01-01 RX ADMIN — Medication 10 MILLIGRAM(S): at 09:42

## 2023-01-01 RX ADMIN — HEPARIN SODIUM 5000 UNIT(S): 5000 INJECTION INTRAVENOUS; SUBCUTANEOUS at 05:25

## 2023-01-01 RX ADMIN — MEROPENEM 1000 MILLIGRAM(S): 1 INJECTION INTRAVENOUS at 05:36

## 2023-01-01 RX ADMIN — GABAPENTIN 100 MILLIGRAM(S): 400 CAPSULE ORAL at 05:38

## 2023-01-01 RX ADMIN — HEPARIN SODIUM 5000 UNIT(S): 5000 INJECTION INTRAVENOUS; SUBCUTANEOUS at 23:29

## 2023-01-01 RX ADMIN — Medication 2 CAPSULE(S): at 12:18

## 2023-01-01 RX ADMIN — Medication 100 MILLIEQUIVALENT(S): at 14:02

## 2023-01-01 RX ADMIN — BUDESONIDE AND FORMOTEROL FUMARATE DIHYDRATE 2 PUFF(S): 160; 4.5 AEROSOL RESPIRATORY (INHALATION) at 20:31

## 2023-01-01 RX ADMIN — MEROPENEM 1000 MILLIGRAM(S): 1 INJECTION INTRAVENOUS at 21:23

## 2023-01-01 RX ADMIN — Medication 2 CAPSULE(S): at 08:27

## 2023-01-01 RX ADMIN — Medication 2 CAPSULE(S): at 12:53

## 2023-01-01 RX ADMIN — MEROPENEM 1000 MILLIGRAM(S): 1 INJECTION INTRAVENOUS at 05:37

## 2023-01-01 RX ADMIN — MEROPENEM 1000 MILLIGRAM(S): 1 INJECTION INTRAVENOUS at 23:59

## 2023-01-01 RX ADMIN — Medication 2 CAPSULE(S): at 07:57

## 2023-01-01 RX ADMIN — Medication 260 MILLIGRAM(S): at 21:24

## 2023-01-01 RX ADMIN — AMLODIPINE BESYLATE 10 MILLIGRAM(S): 2.5 TABLET ORAL at 09:40

## 2023-01-01 RX ADMIN — AMLODIPINE BESYLATE 10 MILLIGRAM(S): 2.5 TABLET ORAL at 05:52

## 2023-01-01 RX ADMIN — PANTOPRAZOLE SODIUM 40 MILLIGRAM(S): 20 TABLET, DELAYED RELEASE ORAL at 12:13

## 2023-01-01 RX ADMIN — HYDROMORPHONE HYDROCHLORIDE 30 MILLILITER(S): 2 INJECTION INTRAMUSCULAR; INTRAVENOUS; SUBCUTANEOUS at 19:13

## 2023-01-01 RX ADMIN — MEROPENEM 1000 MILLIGRAM(S): 1 INJECTION INTRAVENOUS at 21:25

## 2023-01-01 RX ADMIN — AMLODIPINE BESYLATE 10 MILLIGRAM(S): 2.5 TABLET ORAL at 09:44

## 2023-01-01 RX ADMIN — Medication 100 MILLIGRAM(S): at 10:31

## 2023-01-01 RX ADMIN — GABAPENTIN 100 MILLIGRAM(S): 400 CAPSULE ORAL at 14:49

## 2023-01-01 RX ADMIN — HEPARIN SODIUM 5000 UNIT(S): 5000 INJECTION INTRAVENOUS; SUBCUTANEOUS at 14:14

## 2023-01-01 RX ADMIN — AMLODIPINE BESYLATE 5 MILLIGRAM(S): 2.5 TABLET ORAL at 16:28

## 2023-01-01 RX ADMIN — TIOTROPIUM BROMIDE 2 PUFF(S): 18 CAPSULE ORAL; RESPIRATORY (INHALATION) at 11:56

## 2023-01-01 RX ADMIN — Medication 975 MILLIGRAM(S): at 17:18

## 2023-01-01 RX ADMIN — MEROPENEM 1000 MILLIGRAM(S): 1 INJECTION INTRAVENOUS at 23:51

## 2023-01-01 RX ADMIN — GABAPENTIN 100 MILLIGRAM(S): 400 CAPSULE ORAL at 23:10

## 2023-01-01 RX ADMIN — Medication 400 MILLIGRAM(S): at 00:16

## 2023-01-01 RX ADMIN — Medication 1: at 00:19

## 2023-01-01 RX ADMIN — DEXTROSE MONOHYDRATE, SODIUM CHLORIDE, AND POTASSIUM CHLORIDE 84 MILLILITER(S): 50; .745; 4.5 INJECTION, SOLUTION INTRAVENOUS at 19:11

## 2023-01-01 RX ADMIN — AMLODIPINE BESYLATE 10 MILLIGRAM(S): 2.5 TABLET ORAL at 09:42

## 2023-01-01 RX ADMIN — DEXTROSE MONOHYDRATE, SODIUM CHLORIDE, AND POTASSIUM CHLORIDE 84 MILLILITER(S): 50; .745; 4.5 INJECTION, SOLUTION INTRAVENOUS at 21:46

## 2023-01-01 RX ADMIN — BUDESONIDE AND FORMOTEROL FUMARATE DIHYDRATE 2 PUFF(S): 160; 4.5 AEROSOL RESPIRATORY (INHALATION) at 08:46

## 2023-01-01 RX ADMIN — Medication 100 MILLIEQUIVALENT(S): at 10:56

## 2023-01-01 RX ADMIN — DEXTROSE MONOHYDRATE, SODIUM CHLORIDE, AND POTASSIUM CHLORIDE 84 MILLILITER(S): 50; .745; 4.5 INJECTION, SOLUTION INTRAVENOUS at 19:15

## 2023-01-01 RX ADMIN — Medication 2 CAPSULE(S): at 16:31

## 2023-01-01 RX ADMIN — Medication 20 MILLIEQUIVALENT(S): at 05:52

## 2023-01-01 RX ADMIN — Medication 20 MILLIGRAM(S): at 15:46

## 2023-01-01 RX ADMIN — GABAPENTIN 100 MILLIGRAM(S): 400 CAPSULE ORAL at 14:02

## 2023-01-01 RX ADMIN — Medication 1000 MILLIGRAM(S): at 05:40

## 2023-01-01 NOTE — HISTORY OF PRESENT ILLNESS
[de-identified] : Ms. Nagel presented to [facility] with jaundice at the end of September, 2022.  Total bilirubin was 17.  Abdominal ultrasound showed a pancreatic lesion and she was transferred to [facility].\par \par CT imaging revealed an infiltrative mass involving the inferior pancreatic head and third portion of the duodenum, concerning for pancreatic adenocarcinoma.  Tumor encased (360 degrees) the superior mesenteric artery.  There was also distal CBD obstruction by tumor.  This necessitated an ERCP for diagnosis, as well as stent placement.\par \par Pathology obtained with Dr. Frost from the pancreatic head revealed adenocarcinoma, proficient mismatch repair.  Morphologically, the tissue resembled that of intestinal origin, with focal presence of goblet cells.  Felt to be an intestinal subtype of pancreatic ductal adenocarcinoma.  She was seen by Dr. Tolentino, who deemed her disease unresectable.\par \par Before our first consultation, she was readmitted to [facility] for strokelike symptoms and melena.  She is found to have a hemoglobin of 7g due to intestinal bleeding that was likely due to the sphincterotomy performed at ERCP.  CT imaging and of the brain and associated vessels was not concerning for an acute ischemic event.  She was transfused red blood cells, placed on octreotide and PPI, with resolution of the bleeding. Hemoglobin at discharge was 8.9g. Bilirubin 3.7.\par \par Ludivina is a daily smoker, and decreasing her intake from over one pack to about half a pack per day as of our first outpatient visit.  She has history of moderate alcohol intake, has stopped drinking since diagnosis.\par  [de-identified] : Tolerated C1 FOLFIRINOX with minimal and expected toxicities. She continues to lose weight, however. States she is trying to eat better. Creon has been started.

## 2023-01-01 NOTE — PHYSICAL EXAM
[Restricted in physically strenuous activity but ambulatory and able to carry out work of a light or sedentary nature] : Status 1- Restricted in physically strenuous activity but ambulatory and able to carry out work of a light or sedentary nature, e.g., light house work, office work [Thin] : thin [Normal] : affect appropriate [de-identified] : right chest wall mediport c/d/i

## 2023-01-10 ENCOUNTER — RESULT REVIEW (OUTPATIENT)
Age: 63
End: 2023-01-10

## 2023-01-10 ENCOUNTER — APPOINTMENT (OUTPATIENT)
Age: 63
End: 2023-01-10

## 2023-01-10 ENCOUNTER — NON-APPOINTMENT (OUTPATIENT)
Age: 63
End: 2023-01-10

## 2023-01-10 LAB
ALBUMIN SERPL ELPH-MCNC: 3.7 G/DL — SIGNIFICANT CHANGE UP (ref 3.3–5)
ALP SERPL-CCNC: 212 U/L — HIGH (ref 40–120)
ALT FLD-CCNC: 14 U/L — SIGNIFICANT CHANGE UP (ref 10–45)
ANION GAP SERPL CALC-SCNC: 11 MMOL/L — SIGNIFICANT CHANGE UP (ref 5–17)
ANISOCYTOSIS BLD QL: SLIGHT — SIGNIFICANT CHANGE UP
AST SERPL-CCNC: 13 U/L — SIGNIFICANT CHANGE UP (ref 10–40)
BASOPHILS # BLD AUTO: 0 K/UL — SIGNIFICANT CHANGE UP (ref 0–0.2)
BASOPHILS NFR BLD AUTO: 0 % — SIGNIFICANT CHANGE UP (ref 0–2)
BILIRUB SERPL-MCNC: 0.2 MG/DL — SIGNIFICANT CHANGE UP (ref 0.2–1.2)
BUN SERPL-MCNC: 8 MG/DL — SIGNIFICANT CHANGE UP (ref 7–23)
CALCIUM SERPL-MCNC: 9.2 MG/DL — SIGNIFICANT CHANGE UP (ref 8.4–10.5)
CHLORIDE SERPL-SCNC: 101 MMOL/L — SIGNIFICANT CHANGE UP (ref 96–108)
CO2 SERPL-SCNC: 30 MMOL/L — SIGNIFICANT CHANGE UP (ref 22–31)
CREAT SERPL-MCNC: 0.51 MG/DL — SIGNIFICANT CHANGE UP (ref 0.5–1.3)
EGFR: 104 ML/MIN/1.73M2 — SIGNIFICANT CHANGE UP
EOSINOPHIL # BLD AUTO: 0.46 K/UL — SIGNIFICANT CHANGE UP (ref 0–0.5)
EOSINOPHIL NFR BLD AUTO: 2 % — SIGNIFICANT CHANGE UP (ref 0–6)
GLUCOSE SERPL-MCNC: 94 MG/DL — SIGNIFICANT CHANGE UP (ref 70–99)
HCT VFR BLD CALC: 29.2 % — LOW (ref 34.5–45)
HGB BLD-MCNC: 9.5 G/DL — LOW (ref 11.5–15.5)
LYMPHOCYTES # BLD AUTO: 11 % — LOW (ref 13–44)
LYMPHOCYTES # BLD AUTO: 2.51 K/UL — SIGNIFICANT CHANGE UP (ref 1–3.3)
MCHC RBC-ENTMCNC: 31.6 PG — SIGNIFICANT CHANGE UP (ref 27–34)
MCHC RBC-ENTMCNC: 32.5 GM/DL — SIGNIFICANT CHANGE UP (ref 32–36)
MCV RBC AUTO: 97 FL — SIGNIFICANT CHANGE UP (ref 80–100)
METAMYELOCYTES # FLD: 2 % — HIGH (ref 0–0)
MONOCYTES # BLD AUTO: 0.46 K/UL — SIGNIFICANT CHANGE UP (ref 0–0.9)
MONOCYTES NFR BLD AUTO: 2 % — SIGNIFICANT CHANGE UP (ref 2–14)
MYELOCYTES NFR BLD: 5 % — HIGH (ref 0–0)
NEUTROPHILS # BLD AUTO: 17.77 K/UL — HIGH (ref 1.8–7.4)
NEUTROPHILS NFR BLD AUTO: 78 % — HIGH (ref 43–77)
NRBC # BLD: 0 /100 — SIGNIFICANT CHANGE UP (ref 0–0)
NRBC # BLD: SIGNIFICANT CHANGE UP /100 WBCS (ref 0–0)
PLAT MORPH BLD: NORMAL — SIGNIFICANT CHANGE UP
PLATELET # BLD AUTO: 146 K/UL — LOW (ref 150–400)
POIKILOCYTOSIS BLD QL AUTO: SLIGHT — SIGNIFICANT CHANGE UP
POLYCHROMASIA BLD QL SMEAR: SLIGHT — SIGNIFICANT CHANGE UP
POTASSIUM SERPL-MCNC: 2.9 MMOL/L — CRITICAL LOW (ref 3.5–5.3)
POTASSIUM SERPL-SCNC: 2.9 MMOL/L — CRITICAL LOW (ref 3.5–5.3)
PROT SERPL-MCNC: 6.3 G/DL — SIGNIFICANT CHANGE UP (ref 6–8.3)
RBC # BLD: 3.01 M/UL — LOW (ref 3.8–5.2)
RBC # FLD: 18.6 % — HIGH (ref 10.3–14.5)
RBC BLD AUTO: SIGNIFICANT CHANGE UP
SODIUM SERPL-SCNC: 141 MMOL/L — SIGNIFICANT CHANGE UP (ref 135–145)
WBC # BLD: 22.78 K/UL — HIGH (ref 3.8–10.5)
WBC # FLD AUTO: 22.78 K/UL — HIGH (ref 3.8–10.5)

## 2023-01-11 ENCOUNTER — NON-APPOINTMENT (OUTPATIENT)
Age: 63
End: 2023-01-11

## 2023-01-12 ENCOUNTER — APPOINTMENT (OUTPATIENT)
Age: 63
End: 2023-01-12

## 2023-01-12 ENCOUNTER — APPOINTMENT (OUTPATIENT)
Dept: HEMATOLOGY ONCOLOGY | Facility: CLINIC | Age: 63
End: 2023-01-12
Payer: COMMERCIAL

## 2023-01-12 ENCOUNTER — NON-APPOINTMENT (OUTPATIENT)
Age: 63
End: 2023-01-12

## 2023-01-12 VITALS
WEIGHT: 93.6 LBS | HEART RATE: 93 BPM | OXYGEN SATURATION: 95 % | DIASTOLIC BLOOD PRESSURE: 82 MMHG | SYSTOLIC BLOOD PRESSURE: 140 MMHG | TEMPERATURE: 97.9 F | HEIGHT: 64 IN | BODY MASS INDEX: 15.98 KG/M2

## 2023-01-12 PROCEDURE — 99213 OFFICE O/P EST LOW 20 MIN: CPT

## 2023-01-12 NOTE — PHYSICAL EXAM
[Restricted in physically strenuous activity but ambulatory and able to carry out work of a light or sedentary nature] : Status 1- Restricted in physically strenuous activity but ambulatory and able to carry out work of a light or sedentary nature, e.g., light house work, office work [Thin] : thin [Normal] : affect appropriate [de-identified] : anicteric [de-identified] : right chest wall mediport c/d/i

## 2023-01-12 NOTE — REVIEW OF SYSTEMS
[Fatigue] : fatigue [Fever] : no fever [Chills] : no chills [Recent Change In Weight] : ~T recent weight change [Dysphagia] : no dysphagia [Odynophagia] : no odynophagia [Chest Pain] : no chest pain [Palpitations] : no palpitations [Lower Ext Edema] : no lower extremity edema [Shortness Of Breath] : no shortness of breath [Wheezing] : no wheezing [Cough] : no cough [SOB on Exertion] : no shortness of breath during exertion [Abdominal Pain] : no abdominal pain [Vomiting] : no vomiting [Constipation] : no constipation [Diarrhea] : no diarrhea [Joint Pain] : no joint pain [Joint Stiffness] : no joint stiffness [Easy Bleeding] : no tendency for easy bleeding [Easy Bruising] : no tendency for easy bruising [Swollen Glands] : no swollen glands [FreeTextEntry2] : up 4 lbs in 2 weeks

## 2023-01-12 NOTE — HISTORY OF PRESENT ILLNESS
[de-identified] : Ms. Nagel presented to [facility] with jaundice at the end of September, 2022.  Total bilirubin was 17.  Abdominal ultrasound showed a pancreatic lesion and she was transferred to [facility].\par \par CT imaging revealed an infiltrative mass involving the inferior pancreatic head and third portion of the duodenum, concerning for pancreatic adenocarcinoma.  Tumor encased (360 degrees) the superior mesenteric artery.  There was also distal CBD obstruction by tumor.  This necessitated an ERCP for diagnosis, as well as stent placement.\par \par Pathology obtained with Dr. Frost from the pancreatic head revealed adenocarcinoma, proficient mismatch repair.  Morphologically, the tissue resembled that of intestinal origin, with focal presence of goblet cells.  Felt to be an intestinal subtype of pancreatic ductal adenocarcinoma.  She was seen by Dr. Tolentino, who deemed her disease unresectable.\par \par Before our first consultation, she was readmitted to [facility] for strokelike symptoms and melena.  She is found to have a hemoglobin of 7g due to intestinal bleeding that was likely due to the sphincterotomy performed at ERCP.  CT imaging and of the brain and associated vessels was not concerning for an acute ischemic event.  She was transfused red blood cells, placed on octreotide and PPI, with resolution of the bleeding. Hemoglobin at discharge was 8.9g. Bilirubin 3.7.\par \par Ludivina is a daily smoker, and decreasing her intake from over one pack to about half a pack per day as of our first outpatient visit.  She has history of moderate alcohol intake, has stopped drinking since diagnosis.\par  [de-identified] : Finishing C6 of FOLFIRINOX today. No further diarrhea, however did have diarrhea with last cycle, resolved w imodium. Fatigue is present, but not dose-limiting. Weight is up 4 lbs since last cycle. Has discussed with nutrition team and is taking Ensure as well as Creon. Some cold dysesthesias

## 2023-01-12 NOTE — RESULTS/DATA
[FreeTextEntry1] : Ms. Nagel is a pleasant 62 year-old woman with unresectable pancreatic cancer (at least T2), on FOLFIRINOX. Patient being seen as per physician's primary plan of care.\par

## 2023-01-24 ENCOUNTER — RESULT REVIEW (OUTPATIENT)
Age: 63
End: 2023-01-24

## 2023-01-24 ENCOUNTER — APPOINTMENT (OUTPATIENT)
Age: 63
End: 2023-01-24
Payer: COMMERCIAL

## 2023-01-24 ENCOUNTER — APPOINTMENT (OUTPATIENT)
Age: 63
End: 2023-01-24

## 2023-01-24 VITALS
OXYGEN SATURATION: 96 % | DIASTOLIC BLOOD PRESSURE: 81 MMHG | RESPIRATION RATE: 18 BRPM | HEART RATE: 74 BPM | SYSTOLIC BLOOD PRESSURE: 159 MMHG | BODY MASS INDEX: 15.63 KG/M2 | TEMPERATURE: 98.7 F | WEIGHT: 91.05 LBS

## 2023-01-24 DIAGNOSIS — E87.6 HYPOKALEMIA: ICD-10-CM

## 2023-01-24 LAB
ALBUMIN SERPL ELPH-MCNC: 3.8 G/DL — SIGNIFICANT CHANGE UP (ref 3.3–5)
ALP SERPL-CCNC: 207 U/L — HIGH (ref 40–120)
ALT FLD-CCNC: 13 U/L — SIGNIFICANT CHANGE UP (ref 10–45)
ANION GAP SERPL CALC-SCNC: 9 MMOL/L — SIGNIFICANT CHANGE UP (ref 5–17)
ANISOCYTOSIS BLD QL: SLIGHT — SIGNIFICANT CHANGE UP
AST SERPL-CCNC: 12 U/L — SIGNIFICANT CHANGE UP (ref 10–40)
BASOPHILS # BLD AUTO: 0.23 K/UL — HIGH (ref 0–0.2)
BASOPHILS NFR BLD AUTO: 1 % — SIGNIFICANT CHANGE UP (ref 0–2)
BILIRUB SERPL-MCNC: 0.2 MG/DL — SIGNIFICANT CHANGE UP (ref 0.2–1.2)
BUN SERPL-MCNC: 10 MG/DL — SIGNIFICANT CHANGE UP (ref 7–23)
CALCIUM SERPL-MCNC: 9.2 MG/DL — SIGNIFICANT CHANGE UP (ref 8.4–10.5)
CHLORIDE SERPL-SCNC: 105 MMOL/L — SIGNIFICANT CHANGE UP (ref 96–108)
CO2 SERPL-SCNC: 25 MMOL/L — SIGNIFICANT CHANGE UP (ref 22–31)
CREAT SERPL-MCNC: 0.51 MG/DL — SIGNIFICANT CHANGE UP (ref 0.5–1.3)
EGFR: 104 ML/MIN/1.73M2 — SIGNIFICANT CHANGE UP
EOSINOPHIL # BLD AUTO: 0.68 K/UL — HIGH (ref 0–0.5)
EOSINOPHIL NFR BLD AUTO: 3 % — SIGNIFICANT CHANGE UP (ref 0–6)
GLUCOSE SERPL-MCNC: 92 MG/DL — SIGNIFICANT CHANGE UP (ref 70–99)
HCT VFR BLD CALC: 28.3 % — LOW (ref 34.5–45)
HGB BLD-MCNC: 9.1 G/DL — LOW (ref 11.5–15.5)
LYMPHOCYTES # BLD AUTO: 1.59 K/UL — SIGNIFICANT CHANGE UP (ref 1–3.3)
LYMPHOCYTES # BLD AUTO: 7 % — LOW (ref 13–44)
MCHC RBC-ENTMCNC: 31.8 PG — SIGNIFICANT CHANGE UP (ref 27–34)
MCHC RBC-ENTMCNC: 32.2 GM/DL — SIGNIFICANT CHANGE UP (ref 32–36)
MCV RBC AUTO: 99 FL — SIGNIFICANT CHANGE UP (ref 80–100)
METAMYELOCYTES # FLD: 3 % — HIGH (ref 0–0)
MONOCYTES # BLD AUTO: 1.36 K/UL — HIGH (ref 0–0.9)
MONOCYTES NFR BLD AUTO: 6 % — SIGNIFICANT CHANGE UP (ref 2–14)
NEUTROPHILS # BLD AUTO: 18.16 K/UL — HIGH (ref 1.8–7.4)
NEUTROPHILS NFR BLD AUTO: 80 % — HIGH (ref 43–77)
NRBC # BLD: 0 /100 — SIGNIFICANT CHANGE UP (ref 0–0)
NRBC # BLD: SIGNIFICANT CHANGE UP /100 WBCS (ref 0–0)
PLAT MORPH BLD: NORMAL — SIGNIFICANT CHANGE UP
PLATELET # BLD AUTO: 208 K/UL — SIGNIFICANT CHANGE UP (ref 150–400)
POIKILOCYTOSIS BLD QL AUTO: SLIGHT — SIGNIFICANT CHANGE UP
POLYCHROMASIA BLD QL SMEAR: SLIGHT — SIGNIFICANT CHANGE UP
POTASSIUM SERPL-MCNC: 4 MMOL/L — SIGNIFICANT CHANGE UP (ref 3.5–5.3)
POTASSIUM SERPL-SCNC: 4 MMOL/L — SIGNIFICANT CHANGE UP (ref 3.5–5.3)
PROT SERPL-MCNC: 6.4 G/DL — SIGNIFICANT CHANGE UP (ref 6–8.3)
RBC # BLD: 2.86 M/UL — LOW (ref 3.8–5.2)
RBC # FLD: 18.8 % — HIGH (ref 10.3–14.5)
RBC BLD AUTO: SIGNIFICANT CHANGE UP
SODIUM SERPL-SCNC: 139 MMOL/L — SIGNIFICANT CHANGE UP (ref 135–145)
WBC # BLD: 22.7 K/UL — HIGH (ref 3.8–10.5)
WBC # FLD AUTO: 22.7 K/UL — HIGH (ref 3.8–10.5)

## 2023-01-24 PROCEDURE — 99213 OFFICE O/P EST LOW 20 MIN: CPT

## 2023-01-24 NOTE — REVIEW OF SYSTEMS
[Fatigue] : fatigue [Recent Change In Weight] : ~T recent weight change [Fever] : no fever [Chills] : no chills [Dysphagia] : no dysphagia [Odynophagia] : no odynophagia [Chest Pain] : no chest pain [Palpitations] : no palpitations [Lower Ext Edema] : no lower extremity edema [Shortness Of Breath] : no shortness of breath [Wheezing] : no wheezing [Cough] : no cough [SOB on Exertion] : no shortness of breath during exertion [Abdominal Pain] : no abdominal pain [Vomiting] : no vomiting [Constipation] : no constipation [Diarrhea] : no diarrhea [Joint Pain] : no joint pain [Joint Stiffness] : no joint stiffness [Easy Bleeding] : no tendency for easy bleeding [Easy Bruising] : no tendency for easy bruising [Swollen Glands] : no swollen glands

## 2023-01-24 NOTE — PHYSICAL EXAM
[Restricted in physically strenuous activity but ambulatory and able to carry out work of a light or sedentary nature] : Status 1- Restricted in physically strenuous activity but ambulatory and able to carry out work of a light or sedentary nature, e.g., light house work, office work [Thin] : thin [Normal] : affect appropriate [de-identified] : anicteric [de-identified] : right chest wall mediport c/d/i

## 2023-01-24 NOTE — HISTORY OF PRESENT ILLNESS
[de-identified] : Ms. Nagel presented to [facility] with jaundice at the end of September, 2022.  Total bilirubin was 17.  Abdominal ultrasound showed a pancreatic lesion and she was transferred to [facility].\par \par CT imaging revealed an infiltrative mass involving the inferior pancreatic head and third portion of the duodenum, concerning for pancreatic adenocarcinoma.  Tumor encased (360 degrees) the superior mesenteric artery.  There was also distal CBD obstruction by tumor.  This necessitated an ERCP for diagnosis, as well as stent placement.\par \par Pathology obtained with Dr. Frost from the pancreatic head revealed adenocarcinoma, proficient mismatch repair.  Morphologically, the tissue resembled that of intestinal origin, with focal presence of goblet cells.  Felt to be an intestinal subtype of pancreatic ductal adenocarcinoma.  She was seen by Dr. Tolentino, who deemed her disease unresectable.\par \par Before our first consultation, she was readmitted to [facility] for strokelike symptoms and melena.  She is found to have a hemoglobin of 7g due to intestinal bleeding that was likely due to the sphincterotomy performed at ERCP.  CT imaging and of the brain and associated vessels was not concerning for an acute ischemic event.  She was transfused red blood cells, placed on octreotide and PPI, with resolution of the bleeding. Hemoglobin at discharge was 8.9g. Bilirubin 3.7.\par \par Ludivina is a daily smoker, and decreasing her intake from over one pack to about half a pack per day as of our first outpatient visit.  She has history of moderate alcohol intake, has stopped drinking since diagnosis.\par  [de-identified] : Today is C7D1 of FOLFIRINOX. CT C/A/P to be completed later this week. Continues to eat as much as she can, losing weight nonetheless. Fatigue is present, but not dose-limiting. Has discussed with nutrition team and is taking Ensure as well as Creon. Some cold dysesthesias

## 2023-01-24 NOTE — RESULTS/DATA
[FreeTextEntry1] : Ms. Nagle is a pleasant 62 year-old woman with unresectable pancreatic cancer (at least T2), on FOLFIRINOX.\par

## 2023-01-26 ENCOUNTER — APPOINTMENT (OUTPATIENT)
Age: 63
End: 2023-01-26

## 2023-01-27 ENCOUNTER — RESULT REVIEW (OUTPATIENT)
Age: 63
End: 2023-01-27

## 2023-01-27 ENCOUNTER — APPOINTMENT (OUTPATIENT)
Dept: CT IMAGING | Facility: CLINIC | Age: 63
End: 2023-01-27
Payer: COMMERCIAL

## 2023-01-27 PROCEDURE — 74177 CT ABD & PELVIS W/CONTRAST: CPT

## 2023-01-27 PROCEDURE — 71260 CT THORAX DX C+: CPT

## 2023-02-06 NOTE — PATIENT PROFILE ADULT - PUBLIC BENEFITS
Occupational Therapy    Patient not seen in therapy.     Discontinue therapy: physician request      Reviewed chart and discussed with care team. Pt to transition to hospice care in LTC setting. No further acute OT needs at this time. Will sign off. Please re-consult if goals of care change.                                  Documented in the chart in the following areas: Assessment. Plan.      Therapy procedure time and total treatment time can be found documented on the Time Entry flowsheet   no

## 2023-02-09 PROBLEM — R63.0 DECREASE IN APPETITE: Status: ACTIVE | Noted: 2022-11-01

## 2023-02-09 NOTE — RESULTS/DATA
[FreeTextEntry1] : Ms. Nagel is a pleasant 62 year-old woman with unresectable pancreatic cancer (at least T2), on FOLFIRINOX. Patient being seen as per physician's primary plan of care.\par \par

## 2023-02-09 NOTE — PHYSICAL EXAM
[Restricted in physically strenuous activity but ambulatory and able to carry out work of a light or sedentary nature] : Status 1- Restricted in physically strenuous activity but ambulatory and able to carry out work of a light or sedentary nature, e.g., light house work, office work [Thin] : thin [Normal] : affect appropriate [de-identified] : anicteric [de-identified] : right chest wall mediport c/d/i

## 2023-02-09 NOTE — REVIEW OF SYSTEMS
[Fatigue] : fatigue [Recent Change In Weight] : ~T recent weight change [Fever] : no fever [Chills] : no chills [Dysphagia] : no dysphagia [Odynophagia] : no odynophagia [Chest Pain] : no chest pain [Palpitations] : no palpitations [Lower Ext Edema] : no lower extremity edema [Shortness Of Breath] : no shortness of breath [Wheezing] : no wheezing [Cough] : no cough [SOB on Exertion] : no shortness of breath during exertion [Abdominal Pain] : no abdominal pain [Vomiting] : no vomiting [Constipation] : no constipation [Diarrhea] : no diarrhea [Joint Pain] : no joint pain [Joint Stiffness] : no joint stiffness [Muscle Weakness] : no muscle weakness [Skin Rash] : no skin rash [Fainting] : no fainting [Difficulty Walking] : no difficulty walking [Easy Bleeding] : no tendency for easy bleeding [Easy Bruising] : no tendency for easy bruising [Swollen Glands] : no swollen glands [FreeTextEntry2] : up to 93 lbs

## 2023-02-09 NOTE — HISTORY OF PRESENT ILLNESS
[de-identified] : Ms. Nagel presented to [facility] with jaundice at the end of September, 2022.  Total bilirubin was 17.  Abdominal ultrasound showed a pancreatic lesion and she was transferred to [facility].\par \par CT imaging revealed an infiltrative mass involving the inferior pancreatic head and third portion of the duodenum, concerning for pancreatic adenocarcinoma.  Tumor encased (360 degrees) the superior mesenteric artery.  There was also distal CBD obstruction by tumor.  This necessitated an ERCP for diagnosis, as well as stent placement.\par \par Pathology obtained with Dr. Frost from the pancreatic head revealed adenocarcinoma, proficient mismatch repair.  Morphologically, the tissue resembled that of intestinal origin, with focal presence of goblet cells.  Felt to be an intestinal subtype of pancreatic ductal adenocarcinoma.  She was seen by Dr. Tolentino, who deemed her disease unresectable.\par \par Before our first consultation, she was readmitted to [facility] for strokelike symptoms and melena.  She is found to have a hemoglobin of 7g due to intestinal bleeding that was likely due to the sphincterotomy performed at ERCP.  CT imaging and of the brain and associated vessels was not concerning for an acute ischemic event.  She was transfused red blood cells, placed on octreotide and PPI, with resolution of the bleeding. Hemoglobin at discharge was 8.9g. Bilirubin 3.7.\par \par Ludivina is a daily smoker, and decreasing her intake from over one pack to about half a pack per day as of our first outpatient visit.  She has history of moderate alcohol intake, has stopped drinking since diagnosis.\par  [de-identified] : Today is C8D3 of FOLFIRINOX. CT shows excellent response to treatment thus far. Continues to eat as much as she can, weight up to 93 lbs. She is taking Ensure as well as Creon. Some cold dysesthesias

## 2023-02-22 NOTE — HISTORY OF PRESENT ILLNESS
[de-identified] : Ms. Nagel presented to [facility] with jaundice at the end of September, 2022.  Total bilirubin was 17.  Abdominal ultrasound showed a pancreatic lesion and she was transferred to [facility].\par \par CT imaging revealed an infiltrative mass involving the inferior pancreatic head and third portion of the duodenum, concerning for pancreatic adenocarcinoma.  Tumor encased (360 degrees) the superior mesenteric artery.  There was also distal CBD obstruction by tumor.  This necessitated an ERCP for diagnosis, as well as stent placement.\par \par Pathology obtained with Dr. Frost from the pancreatic head revealed adenocarcinoma, proficient mismatch repair.  Morphologically, the tissue resembled that of intestinal origin, with focal presence of goblet cells.  Felt to be an intestinal subtype of pancreatic ductal adenocarcinoma.  She was seen by Dr. Tolentino, who deemed her disease unresectable.\par \par Before our first consultation, she was readmitted to [facility] for strokelike symptoms and melena.  She is found to have a hemoglobin of 7g due to intestinal bleeding that was likely due to the sphincterotomy performed at ERCP.  CT imaging and of the brain and associated vessels was not concerning for an acute ischemic event.  She was transfused red blood cells, placed on octreotide and PPI, with resolution of the bleeding. Hemoglobin at discharge was 8.9g. Bilirubin 3.7.\par \par Ludivina is a daily smoker, and decreasing her intake from over one pack to about half a pack per day as of our first outpatient visit.  She has history of moderate alcohol intake, has stopped drinking since diagnosis.\par  [de-identified] : Feeling well overall. Counts are declining. Weight stable. Going to [place] after this cycle, delaying tx x2 weeks. CT C/A/P January, 2023 reveals regression of disease, no longer encasing SMA, <180 deg abutment of SMV.

## 2023-02-22 NOTE — REVIEW OF SYSTEMS
[Fatigue] : fatigue [Fever] : no fever [Chills] : no chills [Dysphagia] : no dysphagia [Odynophagia] : no odynophagia [Chest Pain] : no chest pain [Palpitations] : no palpitations [Lower Ext Edema] : no lower extremity edema [Shortness Of Breath] : no shortness of breath [Wheezing] : no wheezing [Cough] : no cough [SOB on Exertion] : no shortness of breath during exertion [Abdominal Pain] : no abdominal pain [Vomiting] : no vomiting [Constipation] : no constipation [Diarrhea] : no diarrhea [Joint Pain] : no joint pain [Joint Stiffness] : no joint stiffness [Muscle Weakness] : no muscle weakness [Skin Rash] : no skin rash [Fainting] : no fainting [Difficulty Walking] : no difficulty walking [Easy Bleeding] : no tendency for easy bleeding [Easy Bruising] : no tendency for easy bruising [Swollen Glands] : no swollen glands

## 2023-02-22 NOTE — PHYSICAL EXAM
[Restricted in physically strenuous activity but ambulatory and able to carry out work of a light or sedentary nature] : Status 1- Restricted in physically strenuous activity but ambulatory and able to carry out work of a light or sedentary nature, e.g., light house work, office work [Thin] : thin [Normal] : affect appropriate [de-identified] : anicteric [de-identified] : right chest wall mediport c/d/i

## 2023-02-22 NOTE — RESULTS/DATA
[FreeTextEntry1] : Ms. Nagel is a pleasant 62 year-old woman with unresectable pancreatic cancer (at least T2), on FOLFIRINOX.\par \par

## 2023-03-28 NOTE — RESULTS/DATA
[FreeTextEntry1] : Ms. Nagel is a pleasant 62 year-old woman with locally advanced pancreatic cancer (at least T2), on FOLFIRINOX.\par \par

## 2023-03-28 NOTE — HISTORY OF PRESENT ILLNESS
[de-identified] : Ms. Nagel presented to [facility] with jaundice at the end of September, 2022.  Total bilirubin was 17.  Abdominal ultrasound showed a pancreatic lesion and she was transferred to [facility].\par \par CT imaging revealed an infiltrative mass involving the inferior pancreatic head and third portion of the duodenum, concerning for pancreatic adenocarcinoma.  Tumor encased (360 degrees) the superior mesenteric artery.  There was also distal CBD obstruction by tumor.  This necessitated an ERCP for diagnosis, as well as stent placement.\par \par Pathology obtained with Dr. Frost from the pancreatic head revealed adenocarcinoma, proficient mismatch repair.  Morphologically, the tissue resembled that of intestinal origin, with focal presence of goblet cells.  Felt to be an intestinal subtype of pancreatic ductal adenocarcinoma.  She was seen by Dr. Tolentino, who deemed her disease unresectable.\par \par Before our first consultation, she was readmitted to [facility] for strokelike symptoms and melena.  She is found to have a hemoglobin of 7g due to intestinal bleeding that was likely due to the sphincterotomy performed at ERCP.  CT imaging and of the brain and associated vessels was not concerning for an acute ischemic event.  She was transfused red blood cells, placed on octreotide and PPI, with resolution of the bleeding. Hemoglobin at discharge was 8.9g. Bilirubin 3.7.\par \par Ludivina is a daily smoker, and decreasing her intake from over one pack to about half a pack per day as of our first outpatient visit.  She has history of moderate alcohol intake, has stopped drinking since diagnosis.\par \par Started on FOLFIRINOX 10/25/22 -\par \par CT C/A/P January, 2023 reveals regression of disease, no longer encasing SMA, <180 deg abutment of SMV. [de-identified] : Feels well. Returns from [place]. Gained weight. Continuing with C10 of FOLFIRINOX today.

## 2023-03-28 NOTE — PHYSICAL EXAM
[Restricted in physically strenuous activity but ambulatory and able to carry out work of a light or sedentary nature] : Status 1- Restricted in physically strenuous activity but ambulatory and able to carry out work of a light or sedentary nature, e.g., light house work, office work [Thin] : thin [Normal] : affect appropriate [de-identified] : anicteric [de-identified] : right chest wall mediport c/d/i

## 2023-03-28 NOTE — REVIEW OF SYSTEMS
[Fever] : no fever [Chills] : no chills [Dysphagia] : no dysphagia [Odynophagia] : no odynophagia [Chest Pain] : no chest pain [Palpitations] : no palpitations [Lower Ext Edema] : no lower extremity edema [Shortness Of Breath] : no shortness of breath [Wheezing] : no wheezing [Cough] : no cough [SOB on Exertion] : no shortness of breath during exertion [Abdominal Pain] : no abdominal pain [Vomiting] : no vomiting [Constipation] : no constipation [Diarrhea] : no diarrhea [Joint Pain] : no joint pain [Joint Stiffness] : no joint stiffness [Muscle Weakness] : no muscle weakness [Skin Rash] : no skin rash [Fainting] : no fainting [Difficulty Walking] : no difficulty walking [Easy Bleeding] : no tendency for easy bleeding [Easy Bruising] : no tendency for easy bruising [Swollen Glands] : no swollen glands

## 2023-04-10 NOTE — HISTORY OF PRESENT ILLNESS
[de-identified] : 62 yo F with hx of COPD (no inhaler), 1 ppd cigarette x 30+ years, social ETOH use presented [place] [facility] [place]ing of skin noticed on 9/30/22 and mild right sided abdominal pain graded 2/10. Patient was seen at an urgent care initially and had labs and US showing hyponatremia 129, T Bili: 20, and Abdominal US showing pancreatic lesion. She was sent to the [place] ER for further work up then transferred to . She had CT scan done that showed  Infiltrative mass involving the inferior pancreatic head and third portion of the duodenum. Differential includes primary pancreatic adenocarcinoma and duodenal carcinoma. Tumor encasement of the SMA. Distal CBD obstruction by tumor with moderate upstream biliary ductal dilatation. Based on these findings she was felt not to be a surgical candidate given the locally advanced nature. No finding to suggest metastases.Patient underwent biopsy and stenting with GI on 10/11. She was discharged and referred to med./onc Dr. Candelaria. She started on FOLFIRINOX 10/25/22 plan is to complete 12 cycles. She was also seen by Dr. Christian (radiation oncologist) for consideration of neoadjuvant RT. . She has tolerated chemo well and had follow up scan done that showed\par Interval regression in size of pancreatic tumor. Interval resolution of tumor encasement of the SMA.\par \par She reports she feels well. Denies any recent SOB, CP, fever, chills, n/v/d. Reports her appetite is good, denies having any abdominal pain. \par \par She presents to the office for follow up visit and to discuss surgical options.

## 2023-04-10 NOTE — ASSESSMENT
[FreeTextEntry1] : PANCREATIC CANCER\par \par 60 yo F with hx of COPD (no inhaler), 1 ppd cigarette x 30+ years, social ETOH use presented with an Infiltrative mass involving the inferior pancreatic head and third portion of the duodenum.  Tumor encasement of the SMA. Distal CBD obstruction by tumor with moderate upstream biliary ductal dilatation. Based on these findings she was felt not to be a surgical candidate given the locally advanced nature. No finding to suggest metastases.Patient underwent biopsy and stenting with GI on 10/11. She was discharged and referred to Dr. Candelaria and started on FOLFIRINOX 10/25/22. She will complete 12 cycles at the end of this month. \par \par CT in January showed significant response to chemotherapy with no evidence of SMA encasement..I have recommended she complete chemo this month and repeat the CT in May. If she continues to respond without distant disease I would recommend taking her to surgery without RT.\par \par She will see me in one month.

## 2023-06-01 PROBLEM — D64.9 ANEMIA: Status: ACTIVE | Noted: 2022-10-21

## 2023-06-01 NOTE — HISTORY OF PRESENT ILLNESS
[de-identified] : Ms. Nagel presented to [facility] with jaundice at the end of September, 2022.  Total bilirubin was 17.  Abdominal ultrasound showed a pancreatic lesion and she was transferred to [facility].\par \par CT imaging revealed an infiltrative mass involving the inferior pancreatic head and third portion of the duodenum, concerning for pancreatic adenocarcinoma.  Tumor encased (360 degrees) the superior mesenteric artery.  There was also distal CBD obstruction by tumor.  This necessitated an ERCP for diagnosis, as well as stent placement.\par \par Pathology obtained with Dr. Frost from the pancreatic head revealed adenocarcinoma, proficient mismatch repair.  Morphologically, the tissue resembled that of intestinal origin, with focal presence of goblet cells.  Felt to be an intestinal subtype of pancreatic ductal adenocarcinoma.  She was seen by Dr. Tolentino, who deemed her disease unresectable.\par \par Before our first consultation, she was readmitted to [facility] for strokelike symptoms and melena.  She is found to have a hemoglobin of 7g due to intestinal bleeding that was likely due to the sphincterotomy performed at ERCP.  CT imaging and of the brain and associated vessels was not concerning for an acute ischemic event.  She was transfused red blood cells, placed on octreotide and PPI, with resolution of the bleeding. Hemoglobin at discharge was 8.9g. Bilirubin 3.7.\par \par Ludivina is a daily smoker, and decreasing her intake from over one pack to about half a pack per day as of our first outpatient visit.  She has history of moderate alcohol intake, has stopped drinking since diagnosis.\par \par Started on FOLFIRINOX 10/25/22 - 4/25/23\par \par CT C/A/P January, 2023 reveals regression of disease, no longer encasing SMA, <180 deg abutment of SMV. [de-identified] : C12 FOLFIRINOX today. Ludivina has seen Dr. Tolentino who is planning taking her to Whipple resection without preoperative radiation therapy.  She will have a repeat CT first.

## 2023-06-01 NOTE — PHYSICAL EXAM
[Restricted in physically strenuous activity but ambulatory and able to carry out work of a light or sedentary nature] : Status 1- Restricted in physically strenuous activity but ambulatory and able to carry out work of a light or sedentary nature, e.g., light house work, office work [Thin] : thin [Normal] : affect appropriate [de-identified] : anicteric [de-identified] : right chest wall mediport c/d/i

## 2023-06-21 NOTE — PHYSICAL EXAM
[Restricted in physically strenuous activity but ambulatory and able to carry out work of a light or sedentary nature] : Status 1- Restricted in physically strenuous activity but ambulatory and able to carry out work of a light or sedentary nature, e.g., light house work, office work [Thin] : thin [Normal] : no peripheral adenopathy appreciated [de-identified] : anicteric [de-identified] : right chest wall mediport c/d/i

## 2023-06-21 NOTE — RESULTS/DATA
[FreeTextEntry1] : Ms. Nagel is a pleasant 62 year-old woman with locally advanced pancreatic cancer (at least T2), s/p 6 months of neoadjuvant FOLFIRINOX.\par \par

## 2023-06-21 NOTE — REVIEW OF SYSTEMS
[Fever] : no fever [Chills] : no chills [Fatigue] : no fatigue [Dysphagia] : no dysphagia [Odynophagia] : no odynophagia [Chest Pain] : no chest pain [Palpitations] : no palpitations [Lower Ext Edema] : no lower extremity edema [Shortness Of Breath] : no shortness of breath [Wheezing] : no wheezing [Cough] : no cough [SOB on Exertion] : no shortness of breath during exertion [Abdominal Pain] : no abdominal pain [Vomiting] : no vomiting [Constipation] : no constipation [Joint Pain] : no joint pain [Joint Stiffness] : no joint stiffness [Muscle Weakness] : no muscle weakness [Skin Rash] : no skin rash [Fainting] : no fainting [Difficulty Walking] : no difficulty walking [Easy Bleeding] : no tendency for easy bleeding [Easy Bruising] : no tendency for easy bruising [Swollen Glands] : no swollen glands [FreeTextEntry2] : gaining weight

## 2023-06-21 NOTE — HISTORY OF PRESENT ILLNESS
[de-identified] : Ms. Nagel presented to [facility] with jaundice at the end of September, 2022.  Total bilirubin was 17.  Abdominal ultrasound showed a pancreatic lesion and she was transferred to [facility].\par \par CT imaging revealed an infiltrative mass involving the inferior pancreatic head and third portion of the duodenum, concerning for pancreatic adenocarcinoma.  Tumor encased (360 degrees) the superior mesenteric artery.  There was also distal CBD obstruction by tumor.  This necessitated an ERCP for diagnosis, as well as stent placement.\par \par Pathology obtained with Dr. Frost from the pancreatic head revealed adenocarcinoma, proficient mismatch repair.  Morphologically, the tissue resembled that of intestinal origin, with focal presence of goblet cells.  Felt to be an intestinal subtype of pancreatic ductal adenocarcinoma.  She was seen by Dr. Tolentino, who deemed her disease unresectable.\par \par Before our first consultation, she was readmitted to [facility] for strokelike symptoms and melena.  She is found to have a hemoglobin of 7g due to intestinal bleeding that was likely due to the sphincterotomy performed at ERCP.  CT imaging and of the brain and associated vessels was not concerning for an acute ischemic event.  She was transfused red blood cells, placed on octreotide and PPI, with resolution of the bleeding. Hemoglobin at discharge was 8.9g. Bilirubin 3.7.\par \par Ludivina is a daily smoker, and decreasing her intake from over one pack to about half a pack per day as of our first outpatient visit.  She has history of moderate alcohol intake, has stopped drinking since diagnosis.\par \par Started on FOLFIRINOX 10/25/22 - 4/25/23\par \par CT C/A/P January, 2023 reveals regression of disease, no longer encasing SMA, <180 deg abutment of SMV. [de-identified] : Doing very well overall. Seeing Dr. Tolentino next week to discuss Whipple. BPs elevated today, states it is white coat syndrome.\par \par CT C/A/P 6/6/23:\par \par IMPRESSION:\par Pancreatic tumor is no longer visible as discrete mass.\par \par No CT evidence of metastatic disease in the chest, abdomen and pelvis.

## 2023-06-26 NOTE — PHYSICAL EXAM
[Normal] : supple, no neck mass and thyroid not enlarged [Normal Neck Lymph Nodes] : normal neck lymph nodes  [Normal Supraclavicular Lymph Nodes] : normal supraclavicular lymph nodes [Normal] : oriented to person, place and time, with appropriate affect [de-identified] : Decreased BS bilaterally

## 2023-06-26 NOTE — HISTORY OF PRESENT ILLNESS
[de-identified] : Ms. Araujo comes in for a follow-up visit following chemotherapy for treatment of a locally advanced pancreatic cancer involving the SMA.  She was diagnosed in September 2022 when she presented to [facility] with a bilirubin of 17 and an abdominal ultrasound showing a pancreatic lesion.  She was transferred to [facility] where CT imaging revealed an infiltrative mass involving the inferior pancreatic head and third portion of the duodenum.  There was complete encasement of the superior mesenteric artery and she was deemed locally advanced.  There was no sign of metastatic disease.  Her bile duct was stented and her bilirubin normalized.  She was started on full [place] on October 25, 2022 and completed it in April 25, 2023.  She had a follow-up scan on June 6, 2023.  The scan showed that the pancreatic tumor was no longer visible as a discrete mass and there was no CT evidence of metastatic disease in the chest abdomen or pelvis.  She comes in today asymptomatic.  She denies any abdominal pain she has had no fever or chills.  She denies any changes in the color of her stool or urine.  She is no evidence of scleral icterus or pruritus.  She is having regular formed bowel movements at least daily if not every other day.  She continues to smoke about a half a pack per day.  Her CA 19–9 is now 13 down from the original 48 in October 2022. The patient and her daughter come in today for follow-up as to next steps. [Recurrence of disease] : no recurrence of disease [No] : no [TextBox_4] : 6/26/23 [TextBox_12] : FOLFIRINOX

## 2023-06-26 NOTE — ASSESSMENT
[FreeTextEntry1] : PANCREATIC CANCER\par \par 60 yo F with hx of COPD (no inhaler), 1 ppd cigarette x 30+ years, social ETOH use presented with an Infiltrative mass involving the inferior pancreatic head and third portion of the duodenum.  Tumor encasement of the SMA. Distal CBD obstruction by tumor with moderate upstream biliary ductal dilatation. Based on these findings she was felt not to be a surgical candidate given the locally advanced nature. No finding to suggest metastases.Patient underwent biopsy and stenting with GI on 10/11. She was discharged and referred to Dr. Candelaria and started on FOLFIRINOX 10/25/22. She will complete 12 cycles at the end of this month. \par \par CT in January showed significant response to chemotherapy with no evidence of SMA encasement..I have recommended she complete chemo this month and repeat the CT in May. If she continues to respond without distant disease I would recommend taking her to surgery without RT.\par \par She will see me in one month.\par \par 6/26/23\par Mrs. Mrs. Nagel has had an excellent response to chemotherapy.  I have reviewed the CT scan and clearly there is a regression of the previously seen soft tissue around the SMA and its branches.  She is doing quite well so far.  She still smokes quite a bit and is at high risk for pulmonary complications.  I would reconsider sending her for radiation therapy however we will discuss her case at tumor board and I will get back to the patient.  I will have her see the radiation oncologist and we will see her back in 1 month to decide on the final plan.

## 2023-08-01 NOTE — PHYSICAL EXAM
[Restricted in physically strenuous activity but ambulatory and able to carry out work of a light or sedentary nature] : Status 1- Restricted in physically strenuous activity but ambulatory and able to carry out work of a light or sedentary nature, e.g., light house work, office work [Thin] : thin [Normal] : affect appropriate [de-identified] : anicteric [de-identified] : right chest wall mediport c/d/i

## 2023-08-01 NOTE — HISTORY OF PRESENT ILLNESS
[de-identified] : Ms. Nagel presented to [facility] with jaundice at the end of September, 2022.  Total bilirubin was 17.  Abdominal ultrasound showed a pancreatic lesion and she was transferred to [facility].  CT imaging revealed an infiltrative mass involving the inferior pancreatic head and third portion of the duodenum, concerning for pancreatic adenocarcinoma.  Tumor encased (360 degrees) the superior mesenteric artery.  There was also distal CBD obstruction by tumor.  This necessitated an ERCP for diagnosis, as well as stent placement.  Pathology obtained with Dr. Frost from the pancreatic head revealed adenocarcinoma, proficient mismatch repair.  Morphologically, the tissue resembled that of intestinal origin, with focal presence of goblet cells.  Felt to be an intestinal subtype of pancreatic ductal adenocarcinoma.  She was seen by Dr. Tolentino, who deemed her disease unresectable.  Before our first consultation, she was readmitted to [facility] for strokelike symptoms and melena.  She is found to have a hemoglobin of 7g due to intestinal bleeding that was likely due to the sphincterotomy performed at ERCP.  CT imaging and of the brain and associated vessels was not concerning for an acute ischemic event.  She was transfused red blood cells, placed on octreotide and PPI, with resolution of the bleeding. Hemoglobin at discharge was 8.9g. Bilirubin 3.7.  Ludivina is a daily smoker, and decreasing her intake from over one pack to about half a pack per day as of our first outpatient visit.  She has history of moderate alcohol intake, has stopped drinking since diagnosis.  Started on FOLFIRINOX 10/25/22 - 4/25/23  CT C/A/P January, 2023 reveals regression of disease, no longer encasing SMA, <180 deg abutment of SMV.  CT C/A/P 6/6/23: IMPRESSION: Pancreatic tumor is no longer visible as discrete mass. No CT evidence of metastatic disease in the chest, abdomen and pelvis. [de-identified] : Dr. Christian to complete a short course of SBRT to pancreas, then to proceed to Whipple.  Feels well overall, gaining weight.  No long-term complications of chemotherapy.  BP remains somewhat elevated.

## 2023-08-28 NOTE — HISTORY OF PRESENT ILLNESS
[de-identified] : Ms. Nagel comes in for a follow-up visit following chemotherapy for treatment of a locally advanced pancreatic cancer involving the SMA. She was diagnosed in September 2022 when she presented to [facility] with a bilirubin of 17 and an abdominal ultrasound showing a pancreatic lesion. She was transferred to [facility] where CT imaging revealed an infiltrative mass involving the inferior pancreatic head and third portion of the duodenum. There was complete encasement of the superior mesenteric artery and she was deemed locally advanced. There was no sign of metastatic disease. Her bile duct was stented and her bilirubin normalized. She was started on full ferry [place] on October 25, 2022 and completed it in April 25, 2023. She had a follow-up scan on June 6, 2023. The scan showed that the pancreatic tumor was no longer visible as a discrete mass and there was no CT evidence of metastatic disease in the chest abdomen or pelvis. She comes in today asymptomatic. She denies any abdominal pain she has had no fever or chills. She denies any changes in the color of her stool or urine. She is no evidence of scleral icterus or pruritus. She is having regular formed bowel movements at least daily if not every other day. She continues to smoke about a half a pack per day. Her CA 19-9 is now 13 down from the original 48 in October 2022.   INTERVAL HISOTRY 8/28/23  Ms. Nagel presents to the office for follow up visit. She has been following up with Dr. Candelaria Started on FOLFIRINOX 10/25/22 - 4/25/23. She was referred to Dr. Christian to complete short course of SBRT to pancreas prior to surgery. She had last scan done in June  [No] : no [TextBox_4] : 8/28/23 [5-FU based] : Type of 1st Neoadjuvant chemotherapy: 5-FU based [FreeTextEntry2] : Robel Lennon Principal Discharge DX:	Fall  Secondary Diagnosis:	UTI (urinary tract infection)

## 2023-08-28 NOTE — ASSESSMENT
[FreeTextEntry1] : PANCREATIC CANCER  62 yo F with hx of COPD (no inhaler), 1 ppd cigarette x 30+ years, social ETOH use presented with an Infiltrative mass involving the inferior pancreatic head and third portion of the duodenum.  Tumor encasement of the SMA. Distal CBD obstruction by tumor with moderate upstream biliary ductal dilatation. Based on these findings she was felt not to be a surgical candidate given the locally advanced nature. No finding to suggest metastases.Patient underwent biopsy and stenting with GI on 10/11. She was discharged and referred to Dr. Candelaria and started on FOLFIRINOX 10/25/22. She will complete 12 cycles at the end of this month.   CT in January showed significant response to chemotherapy with no evidence of SMA encasement..I have recommended she complete chemo this month and repeat the CT in May. If she continues to respond without distant disease I would recommend taking her to surgery without RT.  She will see me in one month.  6/26/23 Mrs. Nagel has had an excellent response to chemotherapy.  I have reviewed the CT scan and clearly there is a regression of the previously seen soft tissue around the SMA and its branches.  She is doing quite well so far.  She still smokes quite a bit and is at high risk for pulmonary complications.  I would reconsider sending her for radiation therapy however we will discuss her case at tumor board, and I will get back to the patient.  I will have her see the radiation oncologist and we will see her back in 1 month to decide on the final plan.  8/28/23 Ms. Omalley has completed a short course of RT as per Dr Christian and is her today to plan for surgery. She denies any abdominal pain or changes in her stool. She denies any skin changes and has maintained her appetite and weight. We discussed the details of her planned surgery, and I answered her questions. She understands that if metastatic disease is found at surgery that we would not proceed with the Whipple operation. She also understands that the Whipple operation will also include surgery on important arteries to the intestine which we will work her to preserve providing there is no active cancer remaining surrunding those vessles.

## 2023-08-28 NOTE — PHYSICAL EXAM
[Normal] : supple, no neck mass and thyroid not enlarged [Normal Neck Lymph Nodes] : normal neck lymph nodes  [Normal] : oriented to person, place and time, with appropriate affect

## 2023-09-10 NOTE — HISTORY OF PRESENT ILLNESS
[de-identified] : Ms. Nagel presented to [facility] with jaundice at the end of September, 2022.  Total bilirubin was 17.  Abdominal ultrasound showed a pancreatic lesion and she was transferred to [facility].  CT imaging revealed an infiltrative mass involving the inferior pancreatic head and third portion of the duodenum, concerning for pancreatic adenocarcinoma.  Tumor encased (360 degrees) the superior mesenteric artery.  There was also distal CBD obstruction by tumor.  This necessitated an ERCP for diagnosis, as well as stent placement.  Pathology obtained with Dr. Frost from the pancreatic head revealed adenocarcinoma, proficient mismatch repair.  Morphologically, the tissue resembled that of intestinal origin, with focal presence of goblet cells.  Felt to be an intestinal subtype of pancreatic ductal adenocarcinoma.  She was seen by Dr. Tolentino, who deemed her disease unresectable.  Before our first consultation, she was readmitted to [facility] for strokelike symptoms and melena.  She is found to have a hemoglobin of 7g due to intestinal bleeding that was likely due to the sphincterotomy performed at ERCP.  CT imaging and of the brain and associated vessels was not concerning for an acute ischemic event.  She was transfused red blood cells, placed on octreotide and PPI, with resolution of the bleeding. Hemoglobin at discharge was 8.9g. Bilirubin 3.7.  Ludivina is a daily smoker, and decreasing her intake from over one pack to about half a pack per day as of our first outpatient visit.  She has history of moderate alcohol intake, has stopped drinking since diagnosis.  Started on FOLFIRINOX 10/25/22 - 4/25/23  CT C/A/P January, 2023 reveals regression of disease, no longer encasing SMA, <180 deg abutment of SMV.  CT C/A/P 6/6/23: IMPRESSION: Pancreatic tumor is no longer visible as discrete mass. No CT evidence of metastatic disease in the chest, abdomen and pelvis. [de-identified] : Dr. Christian to complete a short course of SBRT to pancreas, then to proceed to Whipple.  Feels well overall, gaining weight.  No long-term complications of chemotherapy.  BP remains somewhat elevated.

## 2023-09-10 NOTE — PHYSICAL EXAM
[Restricted in physically strenuous activity but ambulatory and able to carry out work of a light or sedentary nature] : Status 1- Restricted in physically strenuous activity but ambulatory and able to carry out work of a light or sedentary nature, e.g., light house work, office work [Thin] : thin [Normal] : affect appropriate [de-identified] : anicteric [de-identified] : right chest wall mediport c/d/i

## 2023-09-10 NOTE — REVIEW OF SYSTEMS
[Fever] : no fever [Chills] : no chills [Fatigue] : no fatigue [Dysphagia] : no dysphagia [Odynophagia] : no odynophagia [Chest Pain] : no chest pain [Palpitations] : no palpitations [Lower Ext Edema] : no lower extremity edema [Wheezing] : no wheezing [Shortness Of Breath] : no shortness of breath [Cough] : no cough [SOB on Exertion] : no shortness of breath during exertion [Abdominal Pain] : no abdominal pain [Constipation] : no constipation [Vomiting] : no vomiting [Joint Pain] : no joint pain [Joint Stiffness] : no joint stiffness [Muscle Weakness] : no muscle weakness [Skin Rash] : no skin rash [Fainting] : no fainting [Easy Bleeding] : no tendency for easy bleeding [Difficulty Walking] : no difficulty walking [Easy Bruising] : no tendency for easy bruising [Swollen Glands] : no swollen glands [FreeTextEntry2] : gaining weight

## 2023-09-12 NOTE — H&P PST ADULT - NSICDXPASTSURGICALHX_GEN_ALL_CORE_FT
PAST SURGICAL HISTORY:  History of infusaport central venous catheter insertion     S/p left hip fracture      PAST SURGICAL HISTORY:  History of infusaport central venous catheter insertion     S/P ERCP     S/p left hip fracture

## 2023-09-12 NOTE — H&P PST ADULT - PROBLEM SELECTOR PLAN 1
Pt scheduled for staging laparoscopy, exploratory laparotomy with whipple procedure possible arterial reconstruction on 9/21/2023.  cbc, cmp results in chart, labs done results pending, ekg done Chlorhexidine provided-  written and verbal instructions given, pt able to verbalize understanding.  Preop teaching done pt able to verbalize understanding.    medication day of procedure-  albuterol, pepcid

## 2023-09-12 NOTE — H&P PST ADULT - HISTORY OF PRESENT ILLNESS
63y/o female scheduled for staging laparoscopy, exploratory laparotomy with whipple procedure possible arterial reconstruction on 9/21/2023.  Pt states, "dx 9/2022 with pancreatic lesion, s/p chemotherapy via right mediport last dose 4/2023 followed by radiation last dose 2 weeks ago."

## 2023-09-12 NOTE — H&P PST ADULT - GASTROINTESTINAL
details… normal/soft/nontender/nondistended/normal active bowel sounds/no guarding/no rigidity/no organomegaly/no palpable kwan/no masses palpable

## 2023-09-20 NOTE — ASU PATIENT PROFILE, ADULT - TRANSFUSION HX COMMENT, PROFILE
Patient presents with:  Pain: x 2 months, pain in groin area closer to pubic area       HPI:  Presents with approx 2 month history of right groin pain which is now migrating to pubic area.  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 90 tablet 0   • Aluminum Chloride 20 % External Solution Apply 1 Application topically nightly. 1 Bottle 0   • GLUCOSAMINE SULFATE OR Take 2 capsules by mouth daily.      • alprazolam (XANAX) 0.5 MG Oral encounter. Meds & Refills for this Visit:  Requested Prescriptions      No prescriptions requested or ordered in this encounter       Imaging & Consults:  None    No follow-ups on file. There are no Patient Instructions on file for this visit.     All 10/2022 [facility]

## 2023-09-20 NOTE — ASU PATIENT PROFILE, ADULT - FALL HARM RISK - UNIVERSAL INTERVENTIONS
Bed in lowest position, wheels locked, appropriate side rails in place/Call bell, personal items and telephone in reach/Instruct patient to call for assistance before getting out of bed or chair/Non-slip footwear when patient is out of bed/[place] to call system/Physically safe environment - no spills, clutter or unnecessary equipment/Purposeful Proactive Rounding/Room/bathroom lighting operational, light cord in reach

## 2023-09-20 NOTE — ASU PATIENT PROFILE, ADULT - NSICDXPASTSURGICALHX_GEN_ALL_CORE_FT
PAST SURGICAL HISTORY:  History of infusaport central venous catheter insertion     S/P ERCP     S/p left hip fracture

## 2023-09-21 NOTE — CONSULT NOTE ADULT - ASSESSMENT
Assessment: 63y/o female s/p pyloric preserving whipple for pancreatic mass on 9/21. SICU consulted for hemodynamic monitoring.    PLAN:  Neuro:  - Multimodal pain control w/ tylenol and oxycodone    Resp:  - Out of bed to chair, ambulate as tolerated, and incentive spirometry to prevent atelectasis    CVS:   -Not on pressors    GI:   - NPO  - Bowel regimen with senna & Miralax  - Protonix 40 IV   - Protonix for stress ulcer prophylaxis while intubated/ npo    Renal:  - Monitor I&Os and electrolytes  - replete electrolytes as needed   -     Heme:  - Monitor CBC and coags  - VTE prophylaxis: heparin SQ TID  - SCD's    ID:   - Monitor WBC, temperature, and procalcitonin    Endo:   - SSI

## 2023-09-21 NOTE — ASU PREOP CHECKLIST - BP NONINVASIVE SYSTOLIC (MM HG)
Detail Level: Detailed Depth Of Biopsy: dermis Was A Bandage Applied: Yes Size Of Lesion In Cm: 0.3 X Size Of Lesion In Cm: 0 Biopsy Type: H and E Biopsy Method: Dermablade Anesthesia Type: 1% lidocaine with epinephrine Anesthesia Volume In Cc (Will Not Render If 0): 0.5 Hemostasis: Drysol Wound Care: Petrolatum Dressing: bandage Destruction After The Procedure: No Type Of Destruction Used: Curettage Curettage Text: The wound bed was treated with curettage after the biopsy was performed. Cryotherapy Text: The wound bed was treated with cryotherapy after the biopsy was performed. Electrodesiccation Text: The wound bed was treated with electrodesiccation after the biopsy was performed. Electrodesiccation And Curettage Text: The wound bed was treated with electrodesiccation and curettage after the biopsy was performed. Silver Nitrate Text: The wound bed was treated with silver nitrate after the biopsy was performed. Lab: 6 Lab Facility: 3 Consent: Written consent was obtained and risks were reviewed including but not limited to scarring, infection, bleeding, scabbing, incomplete removal, nerve damage and allergy to anesthesia. Post-Care Instructions: I reviewed with the patient in detail post-care instructions. Patient is to keep the biopsy site dry overnight, and then apply bacitracin twice daily until healed. Patient may apply hydrogen peroxide soaks to remove any crusting. Notification Instructions: Patient will be notified of biopsy results. However, patient instructed to call the office if not contacted within 2 weeks. 188 Billing Type: Third-Party Bill Information: Selecting Yes will display possible errors in your note based on the variables you have selected. This validation is only offered as a suggestion for you. PLEASE NOTE THAT THE VALIDATION TEXT WILL BE REMOVED WHEN YOU FINALIZE YOUR NOTE. IF YOU WANT TO FAX A PRELIMINARY NOTE YOU WILL NEED TO TOGGLE THIS TO 'NO' IF YOU DO NOT WANT IT IN YOUR FAXED NOTE.

## 2023-09-21 NOTE — CONSULT NOTE ADULT - SUBJECTIVE AND OBJECTIVE BOX
HPI: 63y/o female scheduled for staging laparoscopy, exploratory laparotomy with whipple procedure possible arterial reconstruction on 9/21/2023.  Pt states, "dx 9/2022 with pancreatic lesion, s/p chemotherapy via right mediport last dose 4/2023 followed by radiation last dose 2 weeks ago." (12 Sep 2023 09:51)      PAST MEDICAL & SURGICAL HISTORY:  Malignant neoplasm of pancreas      Seasonal allergies      Asthma      S/p left hip fracture      History of infusaport central venous catheter insertion      S/P ERCP          REVIEW OF SYSTEMS      General:No Weight change/ Fatigue/ HA/Dizzy	    Skin/Breast: No Rashes/ Lesions/ Masses  	  Ophthalmologic: No Blurry vision/ Glaucoma/ Blindness  	  ENMT: No Hearing loss/ Drainage/ Lesions	    Respiratory and Thorax: No Cough/ Wheezing/ SOB/ Hemoptysis/ Sputum production  	  Cardiovascular: No Chest pain/ Palpitations/ Diaphoresis	    Gastrointestinal: No Nausea/ Vomiting/ Constipation/ Appetite Change	    Genitourinary: No Heamturia/ Dysuria/ Frequency change/ Impotence	    Musculoskeletal: No Pain/ Weakness/ Claudication	    Neurological: No Seizures/ TIA/CVA/ Parastesias	    Psychiatric: No Dementia/ Depression/ SI/HI	    Hematology/Lymphatics: No hx of bleeding/ Edema	    Endocrine:	No Hyperglycemia/ Hypoglycemia    Allergic/Immunologic:	 No Anaphylaxis/ Intolerance/ Recent illnesses    MEDICATIONS  (STANDING):  acetaminophen   IVPB .. 1000 milliGRAM(s) IV Intermittent every 6 hours  heparin   Injectable 5000 Unit(s) SubCutaneous every 8 hours  hydromorphone (10 MICROgram(s)/mL) + bupivacaine 0.0625% in 0.9% Sodium Chloride PCEA 250 milliLiter(s) Epidural PCA Continuous  lactated ringers. 1000 milliLiter(s) (100 mL/Hr) IV Continuous <Continuous>    MEDICATIONS  (PRN):  HYDROmorphone  Injectable 0.25 milliGRAM(s) IV Push every 10 minutes PRN Moderate Pain (4 - 6)  HYDROmorphone  Injectable 0.5 milliGRAM(s) IV Push every 10 minutes PRN Severe Pain (7 - 10)  hydromorphone (10 MICROgram(s)/mL) + bupivacaine 0.0625% in 0.9% Sodium Chloride PCEA Rescue Clinician  Bolus 5 milliLiter(s) Epidural every 15 minutes PRN for Pain Score greater than 6  naloxone Injectable 0.1 milliGRAM(s) IV Push every 3 minutes PRN For ANY of the following changes in patient status:  A. RR LESS THAN 10 breaths per minute, B. Oxygen saturation LESS THAN 90%, C. Sedation score of 6  ondansetron Injectable 4 milliGRAM(s) IV Push once PRN Nausea and/or Vomiting  ondansetron Injectable 4 milliGRAM(s) IV Push every 6 hours PRN Nausea  ondansetron Injectable 4 milliGRAM(s) IV Push once PRN Nausea and/or Vomiting      Allergies    No Known Allergies    Intolerances        SOCIAL HISTORY:  Occupation:  Smoking Hx: denies  Etoh Hx: denies  IVDA Hx: denies    FAMILY HISTORY:    unless noted, no significant family hx with Mother, Father, Siblings    Vital Signs Last 24 Hrs  T(C): 36.1 (21 Sep 2023 15:00), Max: 36.6 (21 Sep 2023 08:31)  T(F): 97 (21 Sep 2023 15:00), Max: 97.9 (21 Sep 2023 08:31)  HR: 59 (21 Sep 2023 15:45) (58 - 72)  BP: 198/78 (21 Sep 2023 15:45) (143/78 - 198/78)  BP(mean): 104 (21 Sep 2023 15:45) (89 - 106)  RR: 19 (21 Sep 2023 15:45) (14 - 19)  SpO2: 100% (21 Sep 2023 15:45) (100% - 100%)    Parameters below as of 21 Sep 2023 15:45  Patient On (Oxygen Delivery Method): mask, simple face        General: WN/WD NAD  Neurology: Awake, nonfocal, CLEMENS x 4  Eyes: Scleras clear, PERRLA/ EOMI, Gross vision intact  ENT:Gross hearing intact, grossly patent pharynx, no stridor  Neck: Neck supple, trachea midline, No JVD,   Respiratory: CTA B/L, No wheezing, rales, rhonchi  CV: RRR, S1S2, no murmurs, rubs or gallops  Abdominal: Soft, NT, ND +BS,   Extremities: No edema, + peripheral pulses  Skin: No Rashes, Hematoma, Ecchymosis  Lymphatic: No Neck, axilla, groin LAD  Psych: Oriented x 3, normal affect  Incisions:   Tubes:    LABS:                RADIOLOGY & ADDITIONAL STUDIES:    ASSESSMENT:   62yFemalePAST MEDICAL & SURGICAL HISTORY:  Malignant neoplasm of pancreas      Seasonal allergies      Asthma      S/p left hip fracture      History of infusaport central venous catheter insertion      S/P ERCP      HEALTH ISSUES - PROBLEM Dx:  Malignant neoplasm of pancreas        HEALTH ISSUES - R/O PROBLEM Dx:      PLAN: HPI: 61y/o female s/p pyloric preserving whipple. . No blood products given.     PAST MEDICAL & SURGICAL HISTORY:  Malignant neoplasm of pancreas      Seasonal allergies      Asthma      S/p left hip fracture      History of infusaport central venous catheter insertion      S/P ERCP          REVIEW OF SYSTEMS      General:No Weight change/ Fatigue/ HA/Dizzy	    Skin/Breast: No Rashes/ Lesions/ Masses  	  Ophthalmologic: No Blurry vision/ Glaucoma/ Blindness  	  ENMT: No Hearing loss/ Drainage/ Lesions	    Respiratory and Thorax: No Cough/ Wheezing/ SOB/ Hemoptysis/ Sputum production  	  Cardiovascular: No Chest pain/ Palpitations/ Diaphoresis	    Gastrointestinal: No Nausea/ Vomiting/ Constipation/ Appetite Change	    Genitourinary: No Heamturia/ Dysuria/ Frequency change/ Impotence	    Musculoskeletal: No Pain/ Weakness/ Claudication	    Neurological: No Seizures/ TIA/CVA/ Parastesias	    Psychiatric: No Dementia/ Depression/ SI/HI	    Hematology/Lymphatics: No hx of bleeding/ Edema	    Endocrine:	No Hyperglycemia/ Hypoglycemia    Allergic/Immunologic:	 No Anaphylaxis/ Intolerance/ Recent illnesses    MEDICATIONS  (STANDING):  acetaminophen   IVPB .. 1000 milliGRAM(s) IV Intermittent every 6 hours  heparin   Injectable 5000 Unit(s) SubCutaneous every 8 hours  hydromorphone (10 MICROgram(s)/mL) + bupivacaine 0.0625% in 0.9% Sodium Chloride PCEA 250 milliLiter(s) Epidural PCA Continuous  lactated ringers. 1000 milliLiter(s) (100 mL/Hr) IV Continuous <Continuous>    MEDICATIONS  (PRN):  HYDROmorphone  Injectable 0.25 milliGRAM(s) IV Push every 10 minutes PRN Moderate Pain (4 - 6)  HYDROmorphone  Injectable 0.5 milliGRAM(s) IV Push every 10 minutes PRN Severe Pain (7 - 10)  hydromorphone (10 MICROgram(s)/mL) + bupivacaine 0.0625% in 0.9% Sodium Chloride PCEA Rescue Clinician  Bolus 5 milliLiter(s) Epidural every 15 minutes PRN for Pain Score greater than 6  naloxone Injectable 0.1 milliGRAM(s) IV Push every 3 minutes PRN For ANY of the following changes in patient status:  A. RR LESS THAN 10 breaths per minute, B. Oxygen saturation LESS THAN 90%, C. Sedation score of 6  ondansetron Injectable 4 milliGRAM(s) IV Push once PRN Nausea and/or Vomiting  ondansetron Injectable 4 milliGRAM(s) IV Push every 6 hours PRN Nausea  ondansetron Injectable 4 milliGRAM(s) IV Push once PRN Nausea and/or Vomiting      Allergies    No Known Allergies    Intolerances        SOCIAL HISTORY:  Occupation:  Smoking Hx: denies  Etoh Hx: denies  IVDA Hx: denies    FAMILY HISTORY:    unless noted, no significant family hx with Mother, Father, Siblings    Vital Signs Last 24 Hrs  T(C): 36.1 (21 Sep 2023 15:00), Max: 36.6 (21 Sep 2023 08:31)  T(F): 97 (21 Sep 2023 15:00), Max: 97.9 (21 Sep 2023 08:31)  HR: 59 (21 Sep 2023 15:45) (58 - 72)  BP: 198/78 (21 Sep 2023 15:45) (143/78 - 198/78)  BP(mean): 104 (21 Sep 2023 15:45) (89 - 106)  RR: 19 (21 Sep 2023 15:45) (14 - 19)  SpO2: 100% (21 Sep 2023 15:45) (100% - 100%)    Parameters below as of 21 Sep 2023 15:45  Patient On (Oxygen Delivery Method): mask, simple face        General: WN/WD NAD  Neurology: Awake, nonfocal, CLEMENS x 4  Eyes: Scleras clear, PERRLA/ EOMI, Gross vision intact  ENT:Gross hearing intact, grossly patent pharynx, no stridor  Neck: Neck supple, trachea midline, No JVD,   Respiratory: CTA B/L, No wheezing, rales, rhonchi  CV: RRR, S1S2, no murmurs, rubs or gallops  Abdominal: Soft, NT, ND +BS,   Extremities: No edema, + peripheral pulses  Skin: No Rashes, Hematoma, Ecchymosis  Lymphatic: No Neck, axilla, groin LAD  Psych: Oriented x 3, normal affect  Incisions:   Tubes:    LABS:                RADIOLOGY & ADDITIONAL STUDIES:    ASSESSMENT:   62yFemalePAST MEDICAL & SURGICAL HISTORY:  Malignant neoplasm of pancreas      Seasonal allergies      Asthma      S/p left hip fracture      History of infusaport central venous catheter insertion      S/P ERCP      HEALTH ISSUES - PROBLEM Dx:  Malignant neoplasm of pancreas        HEALTH ISSUES - R/O PROBLEM Dx:      PLAN: HPI: 61y/o female s/p pyloric preserving whipple for pancreatic mass on 9/21. . No blood products given. Denies chest pain, shortness of breath, nausea, vomiting, abdominal pain.    PAST MEDICAL & SURGICAL HISTORY:  Malignant neoplasm of pancreas  Seasonal allergies  Asthma  S/p left hip fracture  History of infusaport central venous catheter insertion  S/P ERCP    REVIEW OF SYSTEMS  Allergic/Immunologic:	 No Anaphylaxis/ Intolerance/ Recent illnesses    MEDICATIONS  (STANDING):  acetaminophen   IVPB .. 1000 milliGRAM(s) IV Intermittent every 6 hours  heparin   Injectable 5000 Unit(s) SubCutaneous every 8 hours  hydromorphone (10 MICROgram(s)/mL) + bupivacaine 0.0625% in 0.9% Sodium Chloride PCEA 250 milliLiter(s) Epidural PCA Continuous  lactated ringers. 1000 milliLiter(s) (100 mL/Hr) IV Continuous <Continuous>    MEDICATIONS  (PRN):  HYDROmorphone  Injectable 0.25 milliGRAM(s) IV Push every 10 minutes PRN Moderate Pain (4 - 6)  HYDROmorphone  Injectable 0.5 milliGRAM(s) IV Push every 10 minutes PRN Severe Pain (7 - 10)  hydromorphone (10 MICROgram(s)/mL) + bupivacaine 0.0625% in 0.9% Sodium Chloride PCEA Rescue Clinician  Bolus 5 milliLiter(s) Epidural every 15 minutes PRN for Pain Score greater than 6  naloxone Injectable 0.1 milliGRAM(s) IV Push every 3 minutes PRN For ANY of the following changes in patient status:  A. RR LESS THAN 10 breaths per minute, B. Oxygen saturation LESS THAN 90%, C. Sedation score of 6  ondansetron Injectable 4 milliGRAM(s) IV Push once PRN Nausea and/or Vomiting  ondansetron Injectable 4 milliGRAM(s) IV Push every 6 hours PRN Nausea  ondansetron Injectable 4 milliGRAM(s) IV Push once PRN Nausea and/or Vomiting    Allergies  No Known Allergies  Intolerances    SOCIAL HISTORY:  Smoking Hx: denies  Etoh Hx: denies  IVDA Hx: denies    FAMILY HISTORY:  unless noted, no significant family hx with Mother, Father, Siblings    Vital Signs Last 24 Hrs  T(C): 36.1 (21 Sep 2023 15:00), Max: 36.6 (21 Sep 2023 08:31)  T(F): 97 (21 Sep 2023 15:00), Max: 97.9 (21 Sep 2023 08:31)  HR: 59 (21 Sep 2023 15:45) (58 - 72)  BP: 198/78 (21 Sep 2023 15:45) (143/78 - 198/78)  BP(mean): 104 (21 Sep 2023 15:45) (89 - 106)  RR: 19 (21 Sep 2023 15:45) (14 - 19)  SpO2: 100% (21 Sep 2023 15:45) (100% - 100%)    Parameters below as of 21 Sep 2023 15:45  Patient On (Oxygen Delivery Method): mask, simple face    General: NAD  Neurology: Awake, CLEMENS x 4  Eyes: Gross vision intact  ENT: Face tent in place  Neck: Neck supple  Respiratory: CTA B/L  CV: RRR, S1S2  Abdominal: Soft, NT, ND +BS, resendiz, prevena in place, JPx 2 with serosang output  Extremities: No edema, + peripheral pulses  Skin: No Rashes, Hematoma, Ecchymosis  Lymphatic: No Neck, axilla, groin LAD  Psych: Oriented x 3, normal affect    LABS:    RADIOLOGY & ADDITIONAL STUDIES:  n/a

## 2023-09-21 NOTE — OPERATIVE REPORT - OPERATIVE RPOSRT DETAILS
Title: Pancreatoduodenectomy, cholecystectomy, intraabdominal omental flap, placement of negative pressure wound therapy device    Anesthesia:  General endotracheal, epidural    Specimens:  Whipple, bile duct margin, pancreatic neck margin    Prosthetic Device/Implants:  None    Operative Indications:  Adenocarcinoma in head of pancreas     PROCEDURE:   After discussing risks, benefits, and alternatives in detail, the patient was consented for a whipple operation and brought to the operating room on the aforementioned date.  They had an epidural catheter placed, and were then placed in the supine position and underwent general anesthesia. Nicolas catheter and arterial line were place.  They were prepped and draped in the normal sterile manner with a chlorhexidine prep and a loban dressing over the skin.  A time-out was performed prior to any incision and the patient was given preoperative prophylactic antibiotics as well as subcutaneous heparin.      We made a small supraumbilical incision and used a quevedo technique to enter the abdomen.  We then insufflated the abdomen to 15mmHg and performed a laparoscopy.  We saw no evidence of metastatic disease.  We then made a midline incision from just below the xiphoid process to below the umbilicus.  We entered the abdomen under direct visualization, excised a large portion of the pre-peritoneal fat and then placed a Bookwalter retractor in position.  We thoroughly explored the abdomen and found no evidence of metastatic disease.  We performed a formal Cattell-Braasch maneuver and kocherized the duodenum to the left of the aorta.  We then enter the lesser sac along the greater curvature of the stomach and dissected down to the SMV.  We took down the gastrohepatic ligament, divided the right gastric artery between 2-0 silk ties, and then identified the GDA.   We test clamped the GDA to confirm there was no retrograde flow.  We then divided the GDA with a vascular load of the echelon powered stapler.  We then encircled the bile duct with a vessel loop.      At this point, I felt the lesion was indeed resectable, so we divide the first portion of the duodenum with a purple load of the THERESA stapler and reflected the proximal stomach to the left upper quadrant.  We then tunneled behind the neck of the pancreas without great difficulty, placed a penrose drain there, and subsequently divided the neck of the pancreas with electrocautery.  We then divide the bile duct with electrocautery.  We removed the gallbladder in the standard top down manner.  We divided the cystic artery and duct between 2-0 silk ties.    We then mobilized the pancreas off of the portal vein and divided the proximal jejunum with a purple load of the THERESA stapler.  We took the mesentery with the LigaSure device and then reflected the proximal jejunum through the ligament of Treitz to the right upper quadrant.  We took the remainder of the uncinate process with multiple Reinhoff clamps, 2-0 silk ties, as well as a LigaSure device and firings of the vascular stapler as appropriate.  There was good hemostasis and we remove the specimen from the field.  We made a small hold in the bare area of the transverse mesocolon to the right of the middle colic vessels and bring the proximal jejunum to the right upper quadrant.      We oversewed the end of the jejunum with 3-0 PDS sutures.     We performed an end-to-side pancreaticojejunostomy using a Blumgart technique.  We left a 5-Tajik pediatric feeding tube as a stent.  We then performed an end-to-side hepaticojejunostomy using 5-0 PDS suture in a standard interrupted manner.  We closed the mesenteric defect with 3-0 silk pop sutures.  We then performed an antecolic isoperistaltic gastrojejunostomy using running 3-0 PDS suture in a two-layer anastomosis.  We mobilized a large omental flap off of the transverse mesocolon and placed this on the PJ anastomosis and the GDA stump.  We confirmed the NG tube is in good position.  We then place two 19 Lincoln drains in the standard position.  The right sided drain was placed below the anastomosis and the left sided drain was placed above.      We then closed the abdominal fascia with a running #1 looped PDS suture.  We closed the dermis with 3-0 Vicryl suture and we affixed a negative-pressure wound therapy device to the incision.     The patient tolerated the procedure well.  The patient was extubated and then transferred to the ICU for overnight monitoring.

## 2023-09-21 NOTE — BRIEF OPERATIVE NOTE - NSICDXBRIEFPOSTOP_GEN_ALL_CORE_FT
POST-OP DIAGNOSIS:  Pancreas cancer 21-Sep-2023 15:06:11  Nisha Ellison   [facility]

## 2023-09-21 NOTE — CONSULT NOTE ADULT - ATTENDING COMMENTS
I agree with the detailed interval history, physical, and plan, which I have reviewed and edited where appropriate'; also agree with notes/assessment with my team on service.  I have personally examined the patient.  I was physically present for the key portions of the evaluation and management (E/M) service provided.  I reviewed all the pertinent data.  The patient is a critical care patient with life threatening hemodynamic and metabolic instability in SICU.  The SICU team has a constant risk benefit analyzes discussion and coordinating care with the primary team and all consultants.   The patient is in SICU with the chief complaint and diagnosis mentioned in the note.   The plan will be specified in the note.  63y/o female s/p pyloric preserving whipple. SICU consulted for hemodynamic monitoring.  Neurology: Awake, CLEMENS x 4  Neck: Neck supple  Respiratory: clear  CV: RR  Abdominal: Soft, NT, ND   Extremities: No edema  PLAN:  Neuro:  -  tylenol and oxycodone  Resp:  - incentive spirometry   CVS:   -MAP>65  GI:   - NPO  - Protonix   Renal:  - Monitor electrolytes  -   Heme:  - Monitor CBC and coags  - heparin SQ TID  - SCD's  ID:   - Monitor WBC  Endo:   - SSI  DISPO: SICU

## 2023-09-21 NOTE — PROGRESS NOTE ADULT - SUBJECTIVE AND OBJECTIVE BOX
Anesthesia Pain Management Service: Post Op  Day _0_ of Epidural    SUBJECTIVE: Patient with no pain relief even after test bolus doses per PACU team.  Pain Scale Score:  10/10 Refer to charted pain scores    THERAPY:  [x ] Epidural Bupivacaine 0.0625% and Hydromorphone  		[X ] 10 micrograms/mL	[ ] 5 micrograms/mL  [ ] Epidural Bupivacaine 0.0625% and Fentanyl - 2 micrograms/mL  [ ] Epidural Ropivacaine 0.1% plain – 1 mg/mL  [ ] Patient Controlled Regional Anesthesia (PCRA) Ropivacaine  		[ ] 0.2%			[ ] 0.1%    Demand dose __3_ lockout __15_ (minutes) Continuous Rate _3__ Total: ___ Daily      MEDICATIONS  (STANDING):  acetaminophen   IVPB .. 1000 milliGRAM(s) IV Intermittent every 6 hours  heparin   Injectable 5000 Unit(s) SubCutaneous every 8 hours  hydrALAZINE Injectable 10 milliGRAM(s) IV Push once  HYDROmorphone PCA (1 mG/mL) 30 milliLiter(s) PCA Continuous PCA Continuous  insulin lispro (ADMELOG) corrective regimen sliding scale   SubCutaneous every 6 hours  lactated ringers. 1000 milliLiter(s) (100 mL/Hr) IV Continuous <Continuous>  magnesium sulfate  IVPB 2 Gram(s) IV Intermittent once    MEDICATIONS  (PRN):  HYDROmorphone  Injectable 0.5 milliGRAM(s) IV Push every 10 minutes PRN Severe Pain (7 - 10)  HYDROmorphone  Injectable 0.25 milliGRAM(s) IV Push every 10 minutes PRN Moderate Pain (4 - 6)  HYDROmorphone PCA (1 mG/mL) Rescue Clinician Bolus 0.5 milliGRAM(s) IV Push every 15 minutes PRN for Pain Scale GREATER THAN 6  naloxone Injectable 0.1 milliGRAM(s) IV Push every 3 minutes PRN For ANY of the following changes in patient status:  A. RR LESS THAN 10 breaths per minute, B. Oxygen saturation LESS THAN 90%, C. Sedation score of 6  ondansetron Injectable 4 milliGRAM(s) IV Push once PRN Nausea and/or Vomiting      OBJECTIVE: Patient laying in bed.    Assessment of Catheter Site:	[ ] Left	[ ] Right  [x ] Epidural 	[ ] Femoral	      [ ] Saphenous   [ ] Supraclavicular   [ ] Other:    [x ] Dressing intact	[x ] Site non-tender	[ x] Site without erythema, discharge, edema  [x ] Epidural tubing and connection checked	[x] Gross neurological exam within normal limits  [X ] Catheter removed – tip intact		[ ] Afebrile	  [ ] Febrile: ___       [ X] see Temp under VS below)    PT/INR - ( 21 Sep 2023 16:13 )   PT: 10.4 sec;   INR: 0.92 ratio         PTT - ( 21 Sep 2023 16:13 )  PTT:27.6 sec                      11.5   8.61  )-----------( 189      ( 21 Sep 2023 16:13 )             35.1     Vital Signs Last 24 Hrs  T(C): 36.8 (09-21-23 @ 16:30), Max: 36.8 (09-21-23 @ 16:30)  T(F): 98.3 (09-21-23 @ 16:30), Max: 98.3 (09-21-23 @ 16:30)  HR: 62 (09-21-23 @ 16:40) (57 - 72)  BP: 173/63 (09-21-23 @ 16:40) (143/78 - 210/81)  BP(mean): 91 (09-21-23 @ 16:40) (89 - 108)  RR: 17 (09-21-23 @ 16:40) (14 - 22)  SpO2: 100% (09-21-23 @ 16:40) (100% - 100%)      Sedation Score:	[x ] Alert	[ ] Drowsy	[ ] Arousable	[ ] Asleep	[ ] Unresponsive    Side Effects:	[x ] None	[ ] Nausea	[ ] Vomiting	[ ] Pruritus  		[ ] Weakness		[ ] Numbness	[ ] Other:    ASSESSMENT/ PLAN:    Therapy to  be:	[ ] Continue   [ X] Discontinued   [ X] Change to prn Analgesics    Documentation and Verification of current medications:  [ X ] Done	[ ] Not done, not eligible, reason:    Comments: PCEA discontinued. Coags post op within limits, no SQ Heparin administered based on op notes. Discussed with Dr. Hidalgo and floor leader Dr. Valderrama. IV Dilaudid PCA ordered.  Progress Note written now but Patient was seen earlier.

## 2023-09-21 NOTE — ASU PREOP CHECKLIST - SELECT TESTS ORDERED
BMP/CBC/PT/PTT/INR/Type and Cross/Type and Screen BMP/CBC/PT/PTT/INR/Type and Cross/Type and Screen/EKG/POCT Blood Glucose

## 2023-09-21 NOTE — BRIEF OPERATIVE NOTE - OPERATION/FINDINGS
Diagnostic lap was performed and no metastases was seen. We proceed with whipple operation. there as involvement of first jejunal branch of SMV however it was not required a vein resection. The rest of whipple procedure was performed. The PJ, HJ and hand sewn DJ were performed. 2 drains were placed, right  pos and left anterior.

## 2023-09-21 NOTE — OPERATIVE REPORT - NSICDXBRIEFPROCEDURE_GEN_ALL_CORE_FT
PROCEDURES:  Diagnostic laparoscopy with Whipple procedure 21-Sep-2023 15:05:51  Nisha Ellison  Creation, flap, omental, intra-abdominal 21-Sep-2023 15:26:03  Roderick Daniels  Negative pressure wound therapy, less than 50 sq cm 21-Sep-2023 15:26:15  Roderick Daniels

## 2023-09-22 NOTE — PROGRESS NOTE ADULT - ASSESSMENT
ASSESSMENT:  63y/o female with PMHx asthma and neoplasm of pancreas who is s/p pyloric preserving whipple on 9/21. SICU consulted for hemodynamic monitoring.    PLAN:  - Diet: CLD up to 30cc/hr per pathway  - monitor drain amylase   - IVF @84cc/hr  - pain control prn - PCA  - monitor labs, replete prn  - OOB/ambulate as tolerated  - DVT ppx: SQH  - appreciate SICU care    D team  [id number]

## 2023-09-22 NOTE — PROGRESS NOTE ADULT - SUBJECTIVE AND OBJECTIVE BOX
ANESTHESIA POSTOP CHECK    62y Female POSTOP DAY 1 S/P   [ x] General Anesthesia  [ ] Dontae Anesthesia  [ ] MAC    Vital Signs Last 24 Hrs  T(C): 35.8 (22 Sep 2023 12:00), Max: 36.9 (22 Sep 2023 04:00)  T(F): 96.4 (22 Sep 2023 12:00), Max: 98.4 (22 Sep 2023 04:00)  HR: 58 (22 Sep 2023 12:00) (57 - 83)  BP: 136/66 (22 Sep 2023 12:00) (135/64 - 210/81)  BP(mean): 85 (22 Sep 2023 12:00) (77 - 112)  RR: 12 (22 Sep 2023 12:00) (12 - 22)  SpO2: 97% (22 Sep 2023 12:00) (91% - 100%)    Parameters below as of 22 Sep 2023 12:00  Patient On (Oxygen Delivery Method): room air      I&O's Summary    21 Sep 2023 07:01  -  22 Sep 2023 07:00  --------------------------------------------------------  IN: 1750 mL / OUT: 1463 mL / NET: 287 mL    22 Sep 2023 07:01  -  22 Sep 2023 12:16  --------------------------------------------------------  IN: 520 mL / OUT: 200 mL / NET: 320 mL        [x ] NO APPARENT ANESTHESIA COMPLICATIONS      Comments:

## 2023-09-22 NOTE — PROGRESS NOTE ADULT - ASSESSMENT
Assessment: 61y/o female s/p pyloric preserving whipple for pancreatic mass on 9/21. SICU consulted for hemodynamic monitoring.    PLAN:  Neuro:  - Multimodal pain control w/ tylenol and oxycodone    Resp:  - Out of bed to chair, ambulate as tolerated, and incentive spirometry to prevent atelectasis    CVS:   -Not on pressors    GI:   - NPO  - Bowel regimen with senna & Miralax  - Protonix 40 IV   - Protonix for stress ulcer prophylaxis while intubated/ npo    Renal:  - Monitor I&Os and electrolytes  - replete electrolytes as needed   -     Heme:  - Monitor CBC and coags  - VTE prophylaxis: heparin SQ TID  - SCD's    ID:   - Monitor WBC, temperature, and procalcitonin    Endo:   - SSI

## 2023-09-22 NOTE — PROGRESS NOTE ADULT - SUBJECTIVE AND OBJECTIVE BOX
Anesthesia Pain Management Service    SUBJECTIVE: Patient is doing well with IV PCA and no significant problems reported.    Pain Scale Score	At rest: 5/10	With Activity: ___ 	[X ] Refer to charted pain scores    THERAPY:    [ ] IV PCA Morphine		[ ] 5 mg/mL	[ ] 1 mg/mL  [X ] IV PCA Hydromorphone	[ ] 5 mg/mL	[X ] 1 mg/mL  [ ] IV PCA Fentanyl		[ ] 50 micrograms/mL    Demand dose __0.2_ lockout __6_ (minutes) Continuous Rate _0__ Total: __7.2_  mg used (in past 24 hours)      MEDICATIONS  (STANDING):  acetaminophen   IVPB .. 1000 milliGRAM(s) IV Intermittent every 6 hours  dextrose 5% + sodium chloride 0.45% with potassium chloride 20 mEq/L 1000 milliLiter(s) (84 mL/Hr) IV Continuous <Continuous>  heparin   Injectable 5000 Unit(s) SubCutaneous every 8 hours  HYDROmorphone PCA (1 mG/mL) 30 milliLiter(s) PCA Continuous PCA Continuous  influenza   Vaccine 0.5 milliLiter(s) IntraMuscular once  insulin lispro (ADMELOG) corrective regimen sliding scale   SubCutaneous every 6 hours    MEDICATIONS  (PRN):  HYDROmorphone PCA (1 mG/mL) Rescue Clinician Bolus 0.5 milliGRAM(s) IV Push every 15 minutes PRN for Pain Scale GREATER THAN 6  naloxone Injectable 0.1 milliGRAM(s) IV Push every 3 minutes PRN For ANY of the following changes in patient status:  A. RR LESS THAN 10 breaths per minute, B. Oxygen saturation LESS THAN 90%, C. Sedation score of 6      OBJECTIVE: Patient laying in bed.    Sedation Score:	[ X] Alert	[ ] Drowsy 	[ ] Arousable	[ ] Asleep	[ ] Unresponsive    Side Effects:	[X ] None	[ ] Nausea	[ ] Vomiting	[ ] Pruritus  		[ ] Other:    Vital Signs Last 24 Hrs  T(C): 35.8 (22 Sep 2023 08:00), Max: 36.9 (22 Sep 2023 04:00)  T(F): 96.5 (22 Sep 2023 08:00), Max: 98.4 (22 Sep 2023 04:00)  HR: 69 (22 Sep 2023 08:00) (57 - 83)  BP: 152/70 (22 Sep 2023 08:00) (135/64 - 210/81)  BP(mean): 93 (22 Sep 2023 08:00) (77 - 112)  RR: 12 (22 Sep 2023 08:00) (12 - 22)  SpO2: 98% (22 Sep 2023 08:00) (91% - 100%)    Parameters below as of 22 Sep 2023 08:00  Patient On (Oxygen Delivery Method): room air        ASSESSMENT/ PLAN    Therapy to  be:	[ X] Continue   [ ] Discontinued   [ ] Change to prn Analgesics    Documentation and Verification of current medications:   [X] Done	[ ] Not done, not elligible    Comments: Continue IV PCA. Recommend non-opioid adjuvant analgesics to be used when possible and when allowed by primary surgical team.    Progress Note written now but Patient was seen earlier.

## 2023-09-22 NOTE — PROGRESS NOTE ADULT - ATTENDING COMMENTS
I agree with the detailed interval history, physical, and plan, which I have reviewed and edited where appropriate'; also agree with notes/assessment with my team on service.  I have personally examined the patient.  I was physically present for the key portions of the evaluation and management (E/M) service provided.  I reviewed all the pertinent data.  The patient is a critical care patient with life threatening hemodynamic and metabolic instability in SICU.  The SICU team has a constant risk benefit analyzes discussion and coordinating care with the primary team and all consultants.   The patient is in SICU with the chief complaint and diagnosis mentioned in the note.   The plan will be specified in the note.  63y/o female s/p pyloric preserving whipple. SICU consulted for hemodynamic monitoring.  Neurology: CLEMENS x 4  Respiratory: clear  CV: RR  Abdominal: Soft, NT  Extremities: No edema  PLAN:  Neuro:  -  tylenol   - oxycodone  Resp:  - incentive spirometry   CVS:   -MAP>65  GI:   - Protonix   Renal:  - Monitor electrolytes  Heme:  - Monitor CBC   - heparin SQ TID  ID:   - Monitor WBC  Endo:   - SSI  DISPO: DC floor

## 2023-09-22 NOTE — PATIENT PROFILE ADULT - FALL HARM RISK - HARM RISK INTERVENTIONS

## 2023-09-22 NOTE — PROGRESS NOTE ADULT - SUBJECTIVE AND OBJECTIVE BOX
Overnight Events:  - Advance diet per whipple pathway  - No acute events overnight       HPI: 61y/o female s/p pyloric preserving whipple for pancreatic mass on 9/21. . No blood products given. Denies chest pain, shortness of breath, nausea, vomiting, abdominal pain.    PAST MEDICAL & SURGICAL HISTORY:  Malignant neoplasm of pancreas  Seasonal allergies  Asthma  S/p left hip fracture  History of infusaport central venous catheter insertion  S/P ERCP    REVIEW OF SYSTEMS  Allergic/Immunologic:	 No Anaphylaxis/ Intolerance/ Recent illnesses    MEDICATIONS  (STANDING):  acetaminophen   IVPB .. 1000 milliGRAM(s) IV Intermittent every 6 hours  heparin   Injectable 5000 Unit(s) SubCutaneous every 8 hours  hydromorphone (10 MICROgram(s)/mL) + bupivacaine 0.0625% in 0.9% Sodium Chloride PCEA 250 milliLiter(s) Epidural PCA Continuous  lactated ringers. 1000 milliLiter(s) (100 mL/Hr) IV Continuous <Continuous>    MEDICATIONS  (PRN):  HYDROmorphone  Injectable 0.25 milliGRAM(s) IV Push every 10 minutes PRN Moderate Pain (4 - 6)  HYDROmorphone  Injectable 0.5 milliGRAM(s) IV Push every 10 minutes PRN Severe Pain (7 - 10)  hydromorphone (10 MICROgram(s)/mL) + bupivacaine 0.0625% in 0.9% Sodium Chloride PCEA Rescue Clinician  Bolus 5 milliLiter(s) Epidural every 15 minutes PRN for Pain Score greater than 6  naloxone Injectable 0.1 milliGRAM(s) IV Push every 3 minutes PRN For ANY of the following changes in patient status:  A. RR LESS THAN 10 breaths per minute, B. Oxygen saturation LESS THAN 90%, C. Sedation score of 6  ondansetron Injectable 4 milliGRAM(s) IV Push once PRN Nausea and/or Vomiting  ondansetron Injectable 4 milliGRAM(s) IV Push every 6 hours PRN Nausea  ondansetron Injectable 4 milliGRAM(s) IV Push once PRN Nausea and/or Vomiting    Allergies  No Known Allergies  Intolerances    SOCIAL HISTORY:  Smoking Hx: denies  Etoh Hx: denies  IVDA Hx: denies    FAMILY HISTORY:  unless noted, no significant family hx with Mother, Father, Siblings    Vital Signs Last 24 Hrs  T(C): 36.1 (21 Sep 2023 15:00), Max: 36.6 (21 Sep 2023 08:31)  T(F): 97 (21 Sep 2023 15:00), Max: 97.9 (21 Sep 2023 08:31)  HR: 59 (21 Sep 2023 15:45) (58 - 72)  BP: 198/78 (21 Sep 2023 15:45) (143/78 - 198/78)  BP(mean): 104 (21 Sep 2023 15:45) (89 - 106)  RR: 19 (21 Sep 2023 15:45) (14 - 19)  SpO2: 100% (21 Sep 2023 15:45) (100% - 100%)    Parameters below as of 21 Sep 2023 15:45  Patient On (Oxygen Delivery Method): mask, simple face    General: NAD  Neurology: Awake, CLEMENS x 4  Eyes: Gross vision intact  ENT: Face tent in place  Neck: Neck supple  Respiratory: CTA B/L  CV: RRR, S1S2  Abdominal: Soft, NT, ND +BS, resendiz, prevena in place, JPx 2 with serosang output  Extremities: No edema, + peripheral pulses  Skin: No Rashes, Hematoma, Ecchymosis  Lymphatic: No Neck, axilla, groin LAD  Psych: Oriented x 3, normal affect    LABS:                        11.3   11.24 )-----------( 210      ( 22 Sep 2023 00:50 )             34.7       09-22    135  |  100  |  11  ----------------------------<  156<H>  3.9   |  24  |  0.61    Ca    8.5      22 Sep 2023 00:50  Phos  3.9     09-22  Mg     2.10     09-22    TPro  6.1  /  Alb  3.6  /  TBili  0.8  /  DBili  x   /  AST  151<H>  /  ALT  196<H>  /  AlkPhos  89  09-22    RADIOLOGY & ADDITIONAL STUDIES:  n/a Overnight Events:  - Advance diet per whipple pathway  - Hypertensive overnight - hydralazine ordered       HPI: 61y/o female s/p pyloric preserving whipple for pancreatic mass on 9/21. . No blood products given. Denies chest pain, shortness of breath, nausea, vomiting, abdominal pain.    PAST MEDICAL & SURGICAL HISTORY:  Malignant neoplasm of pancreas  Seasonal allergies  Asthma  S/p left hip fracture  History of infusaport central venous catheter insertion  S/P ERCP    REVIEW OF SYSTEMS  Allergic/Immunologic:	 No Anaphylaxis/ Intolerance/ Recent illnesses    MEDICATIONS  (STANDING):  acetaminophen   IVPB .. 1000 milliGRAM(s) IV Intermittent every 6 hours  heparin   Injectable 5000 Unit(s) SubCutaneous every 8 hours  hydromorphone (10 MICROgram(s)/mL) + bupivacaine 0.0625% in 0.9% Sodium Chloride PCEA 250 milliLiter(s) Epidural PCA Continuous  lactated ringers. 1000 milliLiter(s) (100 mL/Hr) IV Continuous <Continuous>    MEDICATIONS  (PRN):  HYDROmorphone  Injectable 0.25 milliGRAM(s) IV Push every 10 minutes PRN Moderate Pain (4 - 6)  HYDROmorphone  Injectable 0.5 milliGRAM(s) IV Push every 10 minutes PRN Severe Pain (7 - 10)  hydromorphone (10 MICROgram(s)/mL) + bupivacaine 0.0625% in 0.9% Sodium Chloride PCEA Rescue Clinician  Bolus 5 milliLiter(s) Epidural every 15 minutes PRN for Pain Score greater than 6  naloxone Injectable 0.1 milliGRAM(s) IV Push every 3 minutes PRN For ANY of the following changes in patient status:  A. RR LESS THAN 10 breaths per minute, B. Oxygen saturation LESS THAN 90%, C. Sedation score of 6  ondansetron Injectable 4 milliGRAM(s) IV Push once PRN Nausea and/or Vomiting  ondansetron Injectable 4 milliGRAM(s) IV Push every 6 hours PRN Nausea  ondansetron Injectable 4 milliGRAM(s) IV Push once PRN Nausea and/or Vomiting    Allergies  No Known Allergies  Intolerances    SOCIAL HISTORY:  Smoking Hx: denies  Etoh Hx: denies  IVDA Hx: denies    FAMILY HISTORY:  unless noted, no significant family hx with Mother, Father, Siblings    Vital Signs Last 24 Hrs  T(C): 36.1 (21 Sep 2023 15:00), Max: 36.6 (21 Sep 2023 08:31)  T(F): 97 (21 Sep 2023 15:00), Max: 97.9 (21 Sep 2023 08:31)  HR: 59 (21 Sep 2023 15:45) (58 - 72)  BP: 198/78 (21 Sep 2023 15:45) (143/78 - 198/78)  BP(mean): 104 (21 Sep 2023 15:45) (89 - 106)  RR: 19 (21 Sep 2023 15:45) (14 - 19)  SpO2: 100% (21 Sep 2023 15:45) (100% - 100%)    Parameters below as of 21 Sep 2023 15:45  Patient On (Oxygen Delivery Method): mask, simple face    General: NAD  Neurology: Awake, CLEMENS x 4  Eyes: Gross vision intact  ENT: Face tent in place  Neck: Neck supple  Respiratory: CTA B/L  CV: RRR, S1S2  Abdominal: Soft, NT, ND +BS, resendiz, prevena in place, JPx 2 with serosang output  Extremities: No edema, + peripheral pulses  Skin: No Rashes, Hematoma, Ecchymosis  Lymphatic: No Neck, axilla, groin LAD  Psych: Oriented x 3, normal affect    LABS:                        11.3   11.24 )-----------( 210      ( 22 Sep 2023 00:50 )             34.7       09-22    135  |  100  |  11  ----------------------------<  156<H>  3.9   |  24  |  0.61    Ca    8.5      22 Sep 2023 00:50  Phos  3.9     09-22  Mg     2.10     09-22    TPro  6.1  /  Alb  3.6  /  TBili  0.8  /  DBili  x   /  AST  151<H>  /  ALT  196<H>  /  AlkPhos  89  09-22    RADIOLOGY & ADDITIONAL STUDIES:  n/a

## 2023-09-22 NOTE — PROGRESS NOTE ADULT - SUBJECTIVE AND OBJECTIVE BOX
D TEAM Surgery Progress Note  Patient is a 62y old  Female who presents with a chief complaint of "I'm having pancreas surgery." (12 Sep 2023 09:51)      INTERVAL EVENTS: Patient is POD# ***No acute events overnight.  SUBJECTIVE: Patient seen and examined at bedside with surgical team, patient without complaints. Denies fever, chills, CP, SOB nausea, vomiting, abdominal pain.      OBJECTIVE:    Vital Signs Last 24 Hrs  T(C): 36.9 (22 Sep 2023 04:00), Max: 36.9 (22 Sep 2023 04:00)  T(F): 98.4 (22 Sep 2023 04:00), Max: 98.4 (22 Sep 2023 04:00)  HR: 79 (22 Sep 2023 05:00) (57 - 83)  BP: 147/53 (22 Sep 2023 05:00) (143/78 - 210/81)  BP(mean): 77 (22 Sep 2023 05:00) (77 - 112)  RR: 14 (22 Sep 2023 05:00) (12 - 22)  SpO2: 97% (22 Sep 2023 05:00) (91% - 100%)    Parameters below as of 22 Sep 2023 04:00  Patient On (Oxygen Delivery Method): room air    I&O's Detail    21 Sep 2023 07:01  -  22 Sep 2023 06:02  --------------------------------------------------------  IN:    IV PiggyBack: 100 mL    Lactated Ringers: 50 mL    Lactated Ringers: 1400 mL  Total IN: 1550 mL    OUT:    Bulb (mL): 96 mL    Bulb (mL): 132 mL    Indwelling Catheter - Urethral (mL): 975 mL    Nasogastric/Oral tube (mL): 200 mL  Total OUT: 1403 mL    Total NET: 147 mL      MEDICATIONS  (STANDING):  acetaminophen   IVPB .. 1000 milliGRAM(s) IV Intermittent every 6 hours  heparin   Injectable 5000 Unit(s) SubCutaneous every 8 hours  HYDROmorphone PCA (1 mG/mL) 30 milliLiter(s) PCA Continuous PCA Continuous  insulin lispro (ADMELOG) corrective regimen sliding scale   SubCutaneous every 6 hours  lactated ringers. 1000 milliLiter(s) (100 mL/Hr) IV Continuous <Continuous>    MEDICATIONS  (PRN):  HYDROmorphone  Injectable 0.5 milliGRAM(s) IV Push every 10 minutes PRN Severe Pain (7 - 10)  HYDROmorphone  Injectable 0.25 milliGRAM(s) IV Push every 10 minutes PRN Moderate Pain (4 - 6)  HYDROmorphone PCA (1 mG/mL) Rescue Clinician Bolus 0.5 milliGRAM(s) IV Push every 15 minutes PRN for Pain Scale GREATER THAN 6  naloxone Injectable 0.1 milliGRAM(s) IV Push every 3 minutes PRN For ANY of the following changes in patient status:  A. RR LESS THAN 10 breaths per minute, B. Oxygen saturation LESS THAN 90%, C. Sedation score of 6  ondansetron Injectable 4 milliGRAM(s) IV Push once PRN Nausea and/or Vomiting      PHYSICAL EXAM:  Constitutional: A&Ox3, NAD  Respiratory: Unlabored breathing  Abdomen: Soft, nondistended, NTTP. No rebound or guarding.  Extremities: WWP, CLEMENS spontaneously    LABS:                        11.3   11.24 )-----------( 210      ( 22 Sep 2023 00:50 )             34.7     09-22    135  |  100  |  11  ----------------------------<  156<H>  3.9   |  24  |  0.61    Ca    8.5      22 Sep 2023 00:50  Phos  3.9     09-22  Mg     2.10     09-22    TPro  6.1  /  Alb  3.6  /  TBili  0.8  /  DBili  x   /  AST  151<H>  /  ALT  196<H>  /  AlkPhos  89  09-22    PT/INR - ( 22 Sep 2023 00:50 )   PT: 10.4 sec;   INR: 0.92 ratio         PTT - ( 22 Sep 2023 00:50 )  PTT:27.1 sec  LIVER FUNCTIONS - ( 22 Sep 2023 00:50 )  Alb: 3.6 g/dL / Pro: 6.1 g/dL / ALK PHOS: 89 U/L / ALT: 196 U/L / AST: 151 U/L / GGT: x           Urinalysis Basic - ( 22 Sep 2023 00:50 )    Color: x / Appearance: x / SG: x / pH: x  Gluc: 156 mg/dL / Ketone: x  / Bili: x / Urobili: x   Blood: x / Protein: x / Nitrite: x   Leuk Esterase: x / RBC: x / WBC x   Sq Epi: x / Non Sq Epi: x / Bacteria: x      ABO Interpretation: B (09-21-23 @ 08:58)      IMAGING:     TEAM [ D ] Surgery Daily Progress Note  =====================================================    SUBJECTIVE: Patient seen and examined at bedside on AM rounds. See SICU note for overnight events.   Patient reports that they're feeling well, pain well-controlled. Denies Flatus/BM. Denies nausea, vomiting, fever, chills, SOB, chest pain.     PAST MEDICAL & SURGICAL HISTORY:  Malignant neoplasm of pancreas  Seasonal allergies  Asthma  S/p left hip fracture  History of infusaport central venous catheter insertion  S/P ERCP      ALLERGIES:  No Known Allergies      --------------------------------------------------------------------------------------    MEDICATIONS:    Neurologic Medications  acetaminophen   IVPB .. 1000 milliGRAM(s) IV Intermittent every 6 hours  HYDROmorphone PCA (1 mG/mL) 30 milliLiter(s) PCA Continuous PCA Continuous  HYDROmorphone PCA (1 mG/mL) Rescue Clinician Bolus 0.5 milliGRAM(s) IV Push every 15 minutes PRN for Pain Scale GREATER THAN 6    Respiratory Medications    Cardiovascular Medications    Gastrointestinal Medications  lactated ringers. 1000 milliLiter(s) IV Continuous <Continuous>    Genitourinary Medications    Hematologic/Oncologic Medications  heparin   Injectable 5000 Unit(s) SubCutaneous every 8 hours  influenza   Vaccine 0.5 milliLiter(s) IntraMuscular once    Antimicrobial/Immunologic Medications    Endocrine/Metabolic Medications  insulin lispro (ADMELOG) corrective regimen sliding scale   SubCutaneous every 6 hours    Topical/Other Medications  naloxone Injectable 0.1 milliGRAM(s) IV Push every 3 minutes PRN For ANY of the following changes in patient status:  A. RR LESS THAN 10 breaths per minute, B. Oxygen saturation LESS THAN 90%, C. Sedation score of 6    --------------------------------------------------------------------------------------    VITAL SIGNS:  T(C): 36.4 (09-22-23 @ 05:45), Max: 36.9 (09-22-23 @ 04:00)  HR: 71 (09-22-23 @ 07:00) (57 - 83)  BP: 144/68 (09-22-23 @ 07:00) (135/64 - 210/81)  RR: 15 (09-22-23 @ 07:00) (12 - 22)  SpO2: 98% (09-22-23 @ 07:00) (91% - 100%)  --------------------------------------------------------------------------------------    EXAM  General: NAD, resting in bed comfortably. A&Ox4.   Cardiac: S1, S2. pulse present   Respiratory: Nonlabored respirations, normal cw expansion. No signs of respiratory distress.   Abdomen: soft, nontender, nondistended. midline prevena holding suction. EDWIN X2 SS. NGT in place. resendiz with urine.    Extremities: no deformities, moving all extremities spontaneously.     --------------------------------------------------------------------------------------    LABS                          11.3   11.24 )-----------( 210      ( 22 Sep 2023 00:50 )             34.7     09-22    135  |  100  |  11  ----------------------------<  156<H>  3.9   |  24  |  0.61    Ca    8.5      22 Sep 2023 00:50  Phos  3.9     09-22  Mg     2.10     09-22    TPro  6.1  /  Alb  3.6  /  TBili  0.8  /  DBili  x   /  AST  151<H>  /  ALT  196<H>  /  AlkPhos  89  09-22        --------------------------------------------------------------------------------------    INS AND OUTS:    09-21-23 @ 07:01  -  09-22-23 @ 07:00  --------------------------------------------------------  IN: 1750 mL / OUT: 1463 mL / NET: 287 mL      --------------------------------------------------------------------------------------

## 2023-09-23 NOTE — DIETITIAN NUTRITION RISK NOTIFICATION - ADDITIONAL COMMENTS/DIETITIAN RECOMMENDATIONS
Please see Dietitian Initial Assessment for complete recommendations.  Meghana Callaway, WILLIAM, CDN #[id number]  Also available on [employer] Teams

## 2023-09-23 NOTE — DIETITIAN INITIAL EVALUATION ADULT - OTHER INFO
Per chart, pt is 62 year old female PMH current smoker, asthma, neoplasm of pancreas now POD #2 s/p pyloric preserving whipple (9/21).     Subjective assessment limited secondary to current clinical condition. Pt confirms NKFA, denies difficulties chewing/swallowing. Pt lives at home, no issues with access to/preparation of food PTA. Pt reports generally good appetite/PO intake PTA, consumes regular diet.    Pt NPO since admission (2 days). NGT removed. Pt with nausea, retching at time of visit. Labs notable for hypophosphatemia, repleted. Fingersticks WDL. +Flatus per flowsheets.

## 2023-09-23 NOTE — PROVIDER CONTACT NOTE (CHANGE IN STATUS NOTIFICATION) - ASSESSMENT
patient bp 185/70; pt asymptomatic lying in bed; other vitals stable; IV fluid rate decreased to 45cc/hr as per emar; BP meds such as amlodipine & hydralazine have not improved patient bp all day; left eidnson drain site dressing saturated, insertion site appears to have whole diameter bigger than tube; drainage is serous/pink clear in color

## 2023-09-23 NOTE — PROGRESS NOTE ADULT - SUBJECTIVE AND OBJECTIVE BOX
SURGERY TRANSFER NOTE    SUBJECTIVE: Patient seen and evaluated upon transfer to T from the SICU. Patient reporting some nausea associated with moving during transport. Pain is well controlled. She has passed gas, no bowel movements. She tolerated minimal clears yesterday. Denies fevers/chills, chest pain, dyspnea, abdominal pain, vomiting, and diarrhea.      OBJECTIVE:  Vital Signs Last 24 Hrs  T(C): 36.7 (23 Sep 2023 05:16), Max: 37.3 (22 Sep 2023 20:00)  T(F): 98.1 (23 Sep 2023 05:16), Max: 99.1 (22 Sep 2023 20:00)  HR: 71 (23 Sep 2023 05:16) (58 - 86)  BP: 185/64 (23 Sep 2023 05:16) (135/64 - 185/64)  BP(mean): 92 (23 Sep 2023 04:00) (85 - 108)  RR: 18 (23 Sep 2023 05:16) (10 - 18)  SpO2: 97% (23 Sep 2023 05:16) (93% - 98%)    Parameters below as of 23 Sep 2023 05:16  Patient On (Oxygen Delivery Method): room air      I&O's Detail    21 Sep 2023 07:01  -  22 Sep 2023 07:00  --------------------------------------------------------  IN:    IV PiggyBack: 100 mL    Lactated Ringers: 50 mL    Lactated Ringers: 1600 mL  Total IN: 1750 mL    OUT:    Bulb (mL): 132 mL    Bulb (mL): 96 mL    Indwelling Catheter - Urethral (mL): 1035 mL    Nasogastric/Oral tube (mL): 200 mL    VAC (Vacuum Assisted Closure) System (mL): 0 mL  Total OUT: 1463 mL    Total NET: 287 mL      22 Sep 2023 07:01  -  23 Sep 2023 05:25  --------------------------------------------------------  IN:    dextrose 5% + sodium chloride 0.45% w/ Additives: 1848 mL    IV PiggyBack: 200 mL  Total IN: 2048 mL    OUT:    Bulb (mL): 60 mL    Bulb (mL): 45 mL    Indwelling Catheter - Urethral (mL): 715 mL    VAC (Vacuum Assisted Closure) System (mL): 0 mL  Total OUT: 820 mL    Total NET: 1228 mL        Daily     Daily   MEDICATIONS  (STANDING):  acetaminophen   IVPB .. 1000 milliGRAM(s) IV Intermittent every 6 hours  dextrose 5% + sodium chloride 0.45% with potassium chloride 20 mEq/L 1000 milliLiter(s) (84 mL/Hr) IV Continuous <Continuous>  heparin   Injectable 5000 Unit(s) SubCutaneous every 8 hours  HYDROmorphone PCA (1 mG/mL) 30 milliLiter(s) PCA Continuous PCA Continuous  influenza   Vaccine 0.5 milliLiter(s) IntraMuscular once  insulin lispro (ADMELOG) corrective regimen sliding scale   SubCutaneous every 6 hours  pantoprazole  Injectable 40 milliGRAM(s) IV Push daily    MEDICATIONS  (PRN):  HYDROmorphone PCA (1 mG/mL) Rescue Clinician Bolus 0.5 milliGRAM(s) IV Push every 15 minutes PRN for Pain Scale GREATER THAN 6  naloxone Injectable 0.1 milliGRAM(s) IV Push every 3 minutes PRN For ANY of the following changes in patient status:  A. RR LESS THAN 10 breaths per minute, B. Oxygen saturation LESS THAN 90%, C. Sedation score of 6      LABS:                        11.3   11.24 )-----------( 210      ( 22 Sep 2023 00:50 )             34.7     09-22    135  |  100  |  11  ----------------------------<  156<H>  3.9   |  24  |  0.61    Ca    8.5      22 Sep 2023 00:50  Phos  3.9     09-22  Mg     2.10     09-22    TPro  6.1  /  Alb  3.6  /  TBili  0.8  /  DBili  x   /  AST  151<H>  /  ALT  196<H>  /  AlkPhos  89  09-22    PT/INR - ( 22 Sep 2023 00:50 )   PT: 10.4 sec;   INR: 0.92 ratio         PTT - ( 22 Sep 2023 00:50 )  PTT:27.1 sec  Urinalysis Basic - ( 22 Sep 2023 00:50 )    Color: x / Appearance: x / SG: x / pH: x  Gluc: 156 mg/dL / Ketone: x  / Bili: x / Urobili: x   Blood: x / Protein: x / Nitrite: x   Leuk Esterase: x / RBC: x / WBC x   Sq Epi: x / Non Sq Epi: x / Bacteria: x        PHYSICAL EXAM:  Constitutional: No acute distress  Pulmonary: Symmetric chest rise bilaterally, no increased WOB  Gastrointestinal: Abdomen soft, nontender, nondistended. No rebound or guarding. Midline prevena in place to good suction. Bilateral EDWIN with serosanguinous output  : Nicolas in place  Extremities: Warm to touch      ASSESSMENT:  61y/o female with PMHx asthma and neoplasm of pancreas who is s/p pyloric preserving whipple on 9/21. SICU consulted for hemodynamic monitoring.    PLAN:  - Whipple pathway  - Diet: CLD up to 30cc/hr  - F/U drain amylases  - IVF @84cc/hr  - Pain control: PCA, IV tylenol  - DVT ppx: SQH    D Team Surgery  [id number]

## 2023-09-23 NOTE — PROVIDER CONTACT NOTE (CHANGE IN STATUS NOTIFICATION) - ACTION/TREATMENT ORDERED:
Inocencio Barry team D  [id number] made aware, advised to replace edinson drain site dressing with new gauze & tegaderm & continue to monitor for S&S of INfection; if dressing continues to saturate please notify team; will speak with team in regards to ordering new bp control meds

## 2023-09-23 NOTE — PROGRESS NOTE ADULT - SUBJECTIVE AND OBJECTIVE BOX
Anesthesia Pain Management Service    SUBJECTIVE: Patient is doing well with IV PCA and no significant problems reported.    Pain Scale Score	At rest: _4/10__ 	With Activity: ___ 	[X ] Refer to charted pain scores    THERAPY:    [ ] IV PCA Morphine		[ ] 5 mg/mL	[ ] 1 mg/mL  [X ] IV PCA Hydromorphone	[ ] 5 mg/mL	[X ] 1 mg/mL  [ ] IV PCA Fentanyl		[ ] 50 micrograms/mL    Demand dose __0.2_ lockout __6_ (minutes) Continuous Rate _0__ Total: __7.4_  mg used (in past 24 hours)      MEDICATIONS  (STANDING):  amLODIPine   Tablet 5 milliGRAM(s) Oral daily  dextrose 5% + sodium chloride 0.45% with potassium chloride 20 mEq/L 1000 milliLiter(s) (84 mL/Hr) IV Continuous <Continuous>  heparin   Injectable 5000 Unit(s) SubCutaneous every 8 hours  HYDROmorphone PCA (1 mG/mL) 30 milliLiter(s) PCA Continuous PCA Continuous  influenza   Vaccine 0.5 milliLiter(s) IntraMuscular once  insulin lispro (ADMELOG) corrective regimen sliding scale   SubCutaneous every 6 hours  pantoprazole  Injectable 40 milliGRAM(s) IV Push daily  potassium chloride  10 mEq/100 mL IVPB 10 milliEquivalent(s) IV Intermittent every 1 hour    MEDICATIONS  (PRN):  hydrALAZINE 10 milliGRAM(s) Oral every 4 hours PRN Systolic blood pressure > 180  HYDROmorphone PCA (1 mG/mL) Rescue Clinician Bolus 0.5 milliGRAM(s) IV Push every 15 minutes PRN for Pain Scale GREATER THAN 6  naloxone Injectable 0.1 milliGRAM(s) IV Push every 3 minutes PRN For ANY of the following changes in patient status:  A. RR LESS THAN 10 breaths per minute, B. Oxygen saturation LESS THAN 90%, C. Sedation score of 6  ondansetron   Disintegrating Tablet 4 milliGRAM(s) Oral every 6 hours PRN Nausea and/or Vomiting      OBJECTIVE: Patient sitting up in bed.    Sedation Score:	[ X] Alert	[ ] Drowsy 	[ ] Arousable	[ ] Asleep	[ ] Unresponsive    Side Effects:	[X ] None	[ ] Nausea	[ ] Vomiting	[ ] Pruritus  		[ ] Other:    Vital Signs Last 24 Hrs  T(C): 37.7 (23 Sep 2023 08:44), Max: 37.8 (23 Sep 2023 08:24)  T(F): 99.8 (23 Sep 2023 08:44), Max: 100.1 (23 Sep 2023 08:24)  HR: 81 (23 Sep 2023 10:15) (67 - 96)  BP: 181/66 (23 Sep 2023 10:15) (150/68 - 190/70)  BP(mean): 92 (23 Sep 2023 04:00) (85 - 108)  RR: 18 (23 Sep 2023 08:24) (10 - 18)  SpO2: 99% (23 Sep 2023 08:24) (93% - 99%)    Parameters below as of 23 Sep 2023 08:24  Patient On (Oxygen Delivery Method): room air        ASSESSMENT/ PLAN    Therapy to  be:	[ X] Continue   [ ] Discontinued   [ ] Change to prn Analgesics    Documentation and Verification of current medications:   [X] Done	[ ] Not done, not elligible    Comments: Continue IV PCA. Recommend non-opioid adjuvant analgesics to be used when possible and when allowed by primary surgical team.    Progress Note written now but Patient was seen earlier.

## 2023-09-23 NOTE — DIETITIAN INITIAL EVALUATION ADULT - PHYSCIAL ASSESSMENT
Appearance consistent with BMI. Nutrition focused physical exam deferred secondary to current clinical condition.

## 2023-09-23 NOTE — PROVIDER CONTACT NOTE (CHANGE IN STATUS NOTIFICATION) - SITUATION
patient bp 185/70; pt asymptomatic lying in bed; other vitals stable; IV fluid rate decreased to 45cc/hr as per emar; BP meds such as amlodipine & hydralazine have not improved patient bp all day; left edinson drain site dressing saturated, insertion site appears to have whole diameter bigger than tube; drainage is serous/pink clear in color

## 2023-09-23 NOTE — DIETITIAN INITIAL EVALUATION ADULT - PERTINENT LABORATORY DATA
(9/23) Na 135, BUN 8, Cr 0.55, <H>, K+ 3.7, Phos 1.9<L>, Mg 2.00, Alk Phos 85, ALT/SGPT 89<H>, AST/SGOT 42<H>  POCT: (9/23) 123-142, (9/22) 126-157

## 2023-09-23 NOTE — DIETITIAN INITIAL EVALUATION ADULT - ETIOLOGY
inability to maintain appropriate weight for height inability to consume sufficient nutrition, inability to meet increased protein-energy needs

## 2023-09-23 NOTE — PROGRESS NOTE ADULT - SUBJECTIVE AND OBJECTIVE BOX
24 Hour Events  - Listed    NEURO  RASS (if intubated): 		CAM ICU (if concern for delirium):  Exam:   Meds: acetaminophen   IVPB .. 1000 milliGRAM(s) IV Intermittent every 6 hours  HYDROmorphone PCA (1 mG/mL) 30 milliLiter(s) PCA Continuous PCA Continuous  HYDROmorphone PCA (1 mG/mL) Rescue Clinician Bolus 0.5 milliGRAM(s) IV Push every 15 minutes PRN for Pain Scale GREATER THAN 6      RESPIRATORY  RR: 12 (09-22-23 @ 20:00) (12 - 18)  SpO2: 97% (09-22-23 @ 20:00) (92% - 98%)  Wt(kg): --  Exam:  Mechanical Ventilation:     Meds:     CARDIOVASCULAR  HR: 75 (09-22-23 @ 20:00) (58 - 79)  BP: 150/68 (09-22-23 @ 20:00) (135/64 - 182/64)  BP(mean): 85 (09-22-23 @ 20:00) (77 - 108)  ABP: 161/55 (09-22-23 @ 08:00) (152/53 - 197/66)  ABP(mean): 96 (09-22-23 @ 08:00) (91 - 115)  Wt(kg): --  CVP(cm H2O): --      Exam:   Cardiac Rhythm:   Perfusion     [ ]Adequate   [ ]Inadequate  Mentation   [ ]Normal       [ ]Reduced  Extremities  [ ]Warm         [ ]Cool  Volume Status [ ]Hypervolemic [ ]Euvolemic [ ]Hypovolemic  Meds:     GI/NUTRITION  Exam:   Diet:   Meds: pantoprazole  Injectable 40 milliGRAM(s) IV Push daily      GENITOURINARY  I&O's Detail    09-21 @ 07:01  -  09-22 @ 07:00  --------------------------------------------------------  IN:    IV PiggyBack: 100 mL    Lactated Ringers: 50 mL    Lactated Ringers: 1600 mL  Total IN: 1750 mL    OUT:    Bulb (mL): 132 mL    Bulb (mL): 96 mL    Indwelling Catheter - Urethral (mL): 1035 mL    Nasogastric/Oral tube (mL): 200 mL    VAC (Vacuum Assisted Closure) System (mL): 0 mL  Total OUT: 1463 mL    Total NET: 287 mL      09-22 @ 07:01  -  09-23 @ 00:08  --------------------------------------------------------  IN:    dextrose 5% + sodium chloride 0.45% w/ Additives: 1428 mL    IV PiggyBack: 100 mL  Total IN: 1528 mL    OUT:    Bulb (mL): 60 mL    Bulb (mL): 45 mL    Indwelling Catheter - Urethral (mL): 575 mL    VAC (Vacuum Assisted Closure) System (mL): 0 mL  Total OUT: 680 mL    Total NET: 848 mL          09-22    135  |  100  |  11  ----------------------------<  156<H>  3.9   |  24  |  0.61    Ca    8.5      22 Sep 2023 00:50  Phos  3.9     09-22  Mg     2.10     09-22    TPro  6.1  /  Alb  3.6  /  TBili  0.8  /  DBili  x   /  AST  151<H>  /  ALT  196<H>  /  AlkPhos  89  09-22    Meds: dextrose 5% + sodium chloride 0.45% with potassium chloride 20 mEq/L 1000 milliLiter(s) IV Continuous <Continuous>      HEMATOLOGIC  Meds: heparin   Injectable 5000 Unit(s) SubCutaneous every 8 hours                          11.3   11.24 )-----------( 210      ( 22 Sep 2023 00:50 )             34.7     PT/INR - ( 22 Sep 2023 00:50 )   PT: 10.4 sec;   INR: 0.92 ratio         PTT - ( 22 Sep 2023 00:50 )  PTT:27.1 sec    INFECTIOUS DISEASES  T(C): 37.3 (09-22-23 @ 20:00), Max: 37.3 (09-22-23 @ 20:00)  Wt(kg): --  WBC Count: 11.24 K/uL (09-22 @ 00:50)    Recent Cultures:  Specimen Source: .Other BILE DUCT CULTURE, 09-21 @ 11:26; Results   Testing in progress; Gram Stain:   No polymorphonuclear cells seen per low power field  No organisms seen per oil power field; Organism: --    Meds: influenza   Vaccine 0.5 milliLiter(s) IntraMuscular once      ENDOCRINE  Capillary Blood Glucose    Meds: insulin lispro (ADMELOG) corrective regimen sliding scale   SubCutaneous every 6 hours      ACCESS DEVICES:  [ ] Peripheral IV  [ ] Central Venous Line		[ ] R	[ ] L	[ ] IJ	[ ] Fem	[ ] SC	Placed:   [ ] Arterial Line			[ ] R	[ ] L	[ ] Fem	[ ] Rad	[ ] Ax	Placed:   [ ] PICC:					[ ] Mediport  [ ] Urinary Catheter, Date Placed:   [ ] Necessity of urinary, arterial, and venous catheters discussed    OTHER MEDICATIONS:  naloxone Injectable 0.1 milliGRAM(s) IV Push every 3 minutes PRN      IMAGING:

## 2023-09-23 NOTE — PROGRESS NOTE ADULT - ASSESSMENT
61y/o female s/p pyloric preserving whipple for pancreatic mass on 9/21. SICU consulted for hemodynamic monitoring.    PLAN:  Neuro:  - Multimodal pain control w/ tylenol and oxycodone    Resp:  - Out of bed to chair, ambulate as tolerated, and incentive spirometry to prevent atelectasis    CVS:   -Not on pressors    GI:   - NPO  - Bowel regimen with senna & Miralax  - Protonix 40 IV   - Protonix for stress ulcer prophylaxis while intubated/ npo    Renal:  - Monitor I&Os and electrolytes  - replete electrolytes as needed   -     Heme:  - Monitor CBC and coags  - VTE prophylaxis: heparin SQ TID  - SCD's    ID:   - Monitor WBC, temperature, and procalcitonin    Endo:   - Low SSI    Dispo: listed

## 2023-09-23 NOTE — DIETITIAN INITIAL EVALUATION ADULT - OTHER CALCULATIONS
Ideal Body Weight: 120 lbs / 54.5 kg +/-10%   Weight history, per HIE: (9/21 dosing) 110 lbs / 49.9 kg, (6/26) 108 lbs, (4/25) 102 lbs, (2/7) 91 lbs.

## 2023-09-23 NOTE — DIETITIAN INITIAL EVALUATION ADULT - PERTINENT MEDS FT
dextrose 5% + sodium chloride 0.45% with potassium chloride IV Continuous   HYDROmorphone PCA Continuous  ADMELOG corrective regimen sliding scale   pantoprazole IV  potassium chloride IV  ondansetron Disintegrating Tablet PRN

## 2023-09-23 NOTE — DIETITIAN INITIAL EVALUATION ADULT - ADD RECOMMEND
Advance diet per Whipple protocol.  RDN remains available to provide nutrition education as appropriate, please consult.  Monitor electrolytes (K+, Mg, P) and replete to within desired limits as clinically indicated.

## 2023-09-23 NOTE — PROVIDER CONTACT NOTE (CHANGE IN STATUS NOTIFICATION) - RECOMMENDATIONS
arrive to bedside to assess drain site & change dressing; place order for bp control Med with longer duration of action and maybe higher potency via oral route

## 2023-09-24 NOTE — PROGRESS NOTE ADULT - SUBJECTIVE AND OBJECTIVE BOX
SUBJECTIVE: Patient feeling well, pain controlled and tolerating limited clears. Denies flatus or BMs at this point in time.     Vital Signs Last 24 Hrs  T(C): 37.1 (24 Sep 2023 12:13), Max: 37.6 (23 Sep 2023 21:19)  T(F): 98.8 (24 Sep 2023 12:13), Max: 99.6 (23 Sep 2023 21:19)  HR: 70 (24 Sep 2023 12:13) (64 - 81)  BP: 150/67 (24 Sep 2023 12:13) (114/61 - 185/74)  BP(mean): --  RR: 16 (24 Sep 2023 12:13) (16 - 18)  SpO2: 95% (24 Sep 2023 12:13) (95% - 97%)    Parameters below as of 24 Sep 2023 12:13  Patient On (Oxygen Delivery Method): room air        I&O's Detail    23 Sep 2023 07:01  -  24 Sep 2023 07:00  --------------------------------------------------------  IN:    dextrose 5% + sodium chloride 0.45% w/ Additives: 1176 mL    IV PiggyBack: 450 mL  Total IN: 1626 mL    OUT:    Bulb (mL): 70 mL    Bulb (mL): 75 mL    Indwelling Catheter - Urethral (mL): 700 mL    Voided (mL): 1850 mL  Total OUT: 2695 mL    Total NET: -1069 mL      24 Sep 2023 07:01  -  24 Sep 2023 14:59  --------------------------------------------------------  IN:    dextrose 5% + sodium chloride 0.45% w/ Additives: 630 mL    IV PiggyBack: 200 mL    Oral Fluid: 120 mL  Total IN: 950 mL    OUT:    Bulb (mL): 7.5 mL    Bulb (mL): 25 mL    Voided (mL): 650 mL  Total OUT: 682.5 mL    Total NET: 267.5 mL          Physical Exam:  General Appearance: Appears well, NAD  Respiratory: No acute resp distress, no accesory muscle use  Abdomen: Soft, nontender, Prevena intact w/ good seal  Extremities: Grossly symmetric, SCD's in place     LABS:                        9.7    6.70  )-----------( 169      ( 24 Sep 2023 05:23 )             29.3     09-24    136  |  100  |  6<L>  ----------------------------<  114<H>  3.4<L>   |  23  |  0.46<L>    Ca    8.8      24 Sep 2023 05:23  Phos  2.5     09-24  Mg     1.90     09-24    TPro  6.0  /  Alb  3.5  /  TBili  0.8  /  DBili  x   /  AST  42<H>  /  ALT  89<H>  /  AlkPhos  85  09-23    PT/INR - ( 23 Sep 2023 06:50 )   PT: 9.6 sec;   INR: <0.90 ratio         PTT - ( 23 Sep 2023 06:50 )  PTT:30.7 sec  Urinalysis Basic - ( 24 Sep 2023 05:23 )    Color: x / Appearance: x / SG: x / pH: x  Gluc: 114 mg/dL / Ketone: x  / Bili: x / Urobili: x   Blood: x / Protein: x / Nitrite: x   Leuk Esterase: x / RBC: x / WBC x   Sq Epi: x / Non Sq Epi: x / Bacteria: x        RADIOLOGY & ADDITIONAL STUDIES:

## 2023-09-24 NOTE — PROGRESS NOTE ADULT - SUBJECTIVE AND OBJECTIVE BOX
Anesthesia Pain Management Service    SUBJECTIVE: Patient is doing well with IV PCA and no significant problems reported.    Pain Scale Score	At rest: __5/10_ 	With Activity: ___ 	[X ] Refer to charted pain scores    THERAPY:    [ ] IV PCA Morphine		[ ] 5 mg/mL	[ ] 1 mg/mL  [X ] IV PCA Hydromorphone	[ ] 5 mg/mL	[X ] 1 mg/mL  [ ] IV PCA Fentanyl		[ ] 50 micrograms/mL    Demand dose __0.2_ lockout __6_ (minutes) Continuous Rate _0__ Total: _5__  mg used (in past 24 hours)      MEDICATIONS  (STANDING):  acetaminophen   IVPB .. 750 milliGRAM(s) IV Intermittent every 6 hours  amLODIPine   Tablet 10 milliGRAM(s) Oral daily  dextrose 5% + sodium chloride 0.45% with potassium chloride 20 mEq/L 1000 milliLiter(s) (42 mL/Hr) IV Continuous <Continuous>  heparin   Injectable 5000 Unit(s) SubCutaneous every 8 hours  HYDROmorphone PCA (1 mG/mL) 30 milliLiter(s) PCA Continuous PCA Continuous  influenza   Vaccine 0.5 milliLiter(s) IntraMuscular once  insulin lispro (ADMELOG) corrective regimen sliding scale   SubCutaneous every 6 hours  labetalol 200 milliGRAM(s) Oral daily  pantoprazole  Injectable 40 milliGRAM(s) IV Push daily    MEDICATIONS  (PRN):  hydrALAZINE Injectable 10 milliGRAM(s) IV Push every 4 hours PRN Systolic BP >180  HYDROmorphone PCA (1 mG/mL) Rescue Clinician Bolus 0.5 milliGRAM(s) IV Push every 15 minutes PRN for Pain Scale GREATER THAN 6  naloxone Injectable 0.1 milliGRAM(s) IV Push every 3 minutes PRN For ANY of the following changes in patient status:  A. RR LESS THAN 10 breaths per minute, B. Oxygen saturation LESS THAN 90%, C. Sedation score of 6  ondansetron   Disintegrating Tablet 4 milliGRAM(s) Oral every 6 hours PRN Nausea and/or Vomiting      OBJECTIVE:  Patient is sitting up in chair, NPO.    Sedation Score:	[ X] Alert	 [ ] Drowsy 	[ ] Arousable	[ ] Asleep	[ ] Unresponsive    Side Effects:	[X ] None	[ ] Nausea	[ ] Vomiting	[ ] Pruritus  		[ ] Other:    Vital Signs Last 24 Hrs  T(C): 36.9 (24 Sep 2023 08:00), Max: 37.6 (23 Sep 2023 21:19)  T(F): 98.4 (24 Sep 2023 08:00), Max: 99.6 (23 Sep 2023 21:19)  HR: 81 (24 Sep 2023 08:00) (64 - 81)  BP: 114/61 (24 Sep 2023 08:00) (114/61 - 185/74)  BP(mean): --  RR: 16 (24 Sep 2023 08:00) (16 - 18)  SpO2: 96% (24 Sep 2023 08:00) (95% - 97%)    Parameters below as of 24 Sep 2023 08:00  Patient On (Oxygen Delivery Method): room air        ASSESSMENT/ PLAN    Therapy to  be:	[ X] Continue   [ ] Discontinued   [ ] Change to prn Analgesics    Documentation and Verification of current medications:   [X] Done	[ ] Not done, not elligible    Comments: Patient is still NPO, will continue with IV Dilaudid PCA.  Recommend non-opioid adjuvant analgesics to be used when possible and when allowed by primary surgical team.    Progress Note written now but Patient was seen earlier.

## 2023-09-24 NOTE — PROVIDER CONTACT NOTE (CRITICAL VALUE NOTIFICATION) - NS PROVIDER READ BACK
I spoke with patients mother, they will be here at 1:30PM today for this appt.  
Patient mother Keisha wanted to see if Dr Hein could see patient sooner, she wanted him to be seen today if possible.  Per Dr Hein i can see him today at 140 if mom able, but can she be there. if not we may need to find different time.  have him check in 10 min early . I LM for patients mother to see if today checking in at 130PM for a 140PM appt works and that she will be able to be at this appt.   
yes

## 2023-09-24 NOTE — PROGRESS NOTE ADULT - ASSESSMENT
ASSESSMENT:  61y/o female with PMHx asthma and neoplasm of pancreas who is s/p pyloric preserving whipple on 9/21. Transferred to floor 9/23.    PLAN:  - Whipple pathway  - Diet: CLD unlimited  - F/U drain amylase, JP1 pulled  - IVF @42cc/hr  - Pain control: PCA, IV tylenol  - DVT ppx: SQH    D Team Surgery  [id number] ASSESSMENT:  61y/o female with PMHx asthma and neoplasm of pancreas who is s/p pyloric preserving whipple on 9/21. Transferred to floor 9/23.    PLAN:  - Whipple pathway  - BP controlled w/ 10mg amlodipine, labetalol PRN for SBP > 180  - Diet: CLD unlimited  - F/U drain amylase, JP1 pulled  - IVF @42cc/hr  - Pain control: PCA, IV tylenol  - DVT ppx: SQH    D Team Surgery  [id number]

## 2023-09-25 NOTE — CHART NOTE - NSCHARTNOTEFT_GEN_A_CORE
Source: Patient A&Ox4   Family [  ]     RN [X]    Chart [X]    Reason for Follow-Up Assessment: Length Of Stay (Education)    62 year old female PMH current smoker, asthma, neoplasm of pancreas now POD #2 s/p pyloric preserving whipple (9/21)    Patient seen at bedside. Patient able answer questions for follow-up assessment. Patient stated daughter is a dietitian. Patient is consuming 100% of full liquid meals. Patient is consuming oral nutritional supplements 100%. Patient does not have any chewing or swallowing difficulties on current diet. Patient does not have any nausea or vomiting during or after meals. Patient is complaining of diarrhea, due to not being able to eat due to surgery. Last bowel movement was today.       Diet, Full Liquid:   No Carbonated Beverages  Supplement Feeding Modality:  Oral  Ensure Enlive Cans or Servings Per Day:  1       Frequency:  Two Times a day (09-25-23 @ 08:19)    PO intake:  < 50% [  ] 50-75% [  ]   % [X]  other :    Anthropometrics: Height (cm): 162.6 (09-21)  Weight (kg): 49.9 (09-21)  BMI (kg/m2): 18.9 (09-21)    Edema: None  Pressure Injuries: None    __________________ Pertinent Medications__________________   MEDICATIONS  (STANDING):  acetaminophen     Tablet .. 975 milliGRAM(s) Oral every 6 hours  amLODIPine   Tablet 10 milliGRAM(s) Oral daily  gabapentin 100 milliGRAM(s) Oral three times a day  heparin   Injectable 5000 Unit(s) SubCutaneous every 8 hours  influenza   Vaccine 0.5 milliLiter(s) IntraMuscular once  pantoprazole    Tablet 40 milliGRAM(s) Oral before breakfast  potassium chloride    Tablet ER 20 milliEquivalent(s) Oral two times a day    MEDICATIONS  (PRN):  HYDROmorphone  Injectable 0.5 milliGRAM(s) IV Push every 3 hours PRN Severe Pain (7 - 10)  ibuprofen  Tablet. 600 milliGRAM(s) Oral every 6 hours PRN Mild Pain (1 - 3)  labetalol 200 milliGRAM(s) Oral every 12 hours PRN SBP > 180  oxyCODONE    IR 5 milliGRAM(s) Oral every 4 hours PRN Severe Pain (7 - 10)  oxyCODONE    IR 2.5 milliGRAM(s) Oral every 4 hours PRN Moderate Pain (4 - 6)      __________________ Pertinent Labs__________________   09-25 Na138 mmol/L Glu 107 mg/dL<H> K+ 3.5 mmol/L Cr  0.52 mg/dL BUN 7 mg/dL 09-25 Phos 2.7 mg/dL 09-23 Alb 3.5 g/dL    POCT Blood Glucose.: 110 mg/dL (09-25-23 @ 11:52)  POCT Blood Glucose.: 143 mg/dL (09-25-23 @ 07:48)  POCT Blood Glucose.: 120 mg/dL (09-24-23 @ 21:14)  POCT Blood Glucose.: 118 mg/dL (09-24-23 @ 17:38)  POCT Blood Glucose.: 133 mg/dL (09-24-23 @ 11:49)  POCT Blood Glucose.: 123 mg/dL (09-24-23 @ 05:55)  POCT Blood Glucose.: 132 mg/dL (09-24-23 @ 01:20)  POCT Blood Glucose.: 136 mg/dL (09-23-23 @ 21:03)  POCT Blood Glucose.: 128 mg/dL (09-23-23 @ 16:59)  POCT Blood Glucose.: 142 mg/dL (09-23-23 @ 11:55)  POCT Blood Glucose.: 123 mg/dL (09-23-23 @ 06:01)  POCT Blood Glucose.: 126 mg/dL (09-22-23 @ 23:34)  POCT Blood Glucose.: 126 mg/dL (09-22-23 @ 17:14)      Needs Assessment:     Weight Used: CBW 50 kg   Estimated Energy Needs: 30 - 35 ria/kg  Estimated Energy Needs: 1500 - 1750 ria/d    Weight Used: CBW 50 kg   Estimated Protein Needs: 1.2 - 1.5 gm/kg   Estimated Protein Needs: 60 - 75 gm/d    Previous Nutrition Diagnosis: Underweight related to inability to maintain appropriate weight for height as evidenced by BMI 18.8 kg/m^2    Nutrition Diagnosis is [X] ongoing  [  ] resolved [  ] not applicable     Education:  [X] Given today        Type of education provided: Verbal education was given to patient on concerns, diet upgrades, and tips when eating for whipple procedure. Handouts on Bariatric Surgery Blended/Pureed Diet Stage Nutrition       Therapy, Bariatric Surgery Soft Diet Stage Nutrition Therapy, Whipple Surgery Nutrition Therapy, and Post-Surgery Meal Plan: 6 Months After Bariatric Surgery And Beyond.  [  ] Given on previous assessment by RD   [  ] Not applicable 2/2 cognitive deficit  [  ] Pt refusal of education offered   [  ] Not applicable 2/2 current prognosis  [  ] Not warranted at present    Recommendations:  1. Continue Full Liquid Diet and upgrade diet as tolerated    Monitoring and Evaluation:   [X] Tolerance to diet prescription [X] weights [X] follow up per protocol  [X] other:
SURGERY POST OP CHECK    STATUS POST PROCEDURE: Pyloric preserving whipple    SUBJECTIVE: Pt seen and examined in the PACU. Patient reporting nausea which resolved with Zofran. Also endorsing mild abdominal pain. PCEA was not working so PCA was placed. Denies CP/SOB/N/V.       OBJECTIVE:  Vital Signs Last 24 Hrs  T(C): 36.1 (21 Sep 2023 19:45), Max: 36.8 (21 Sep 2023 16:30)  T(F): 97 (21 Sep 2023 19:45), Max: 98.3 (21 Sep 2023 16:30)  HR: 63 (21 Sep 2023 19:45) (57 - 83)  BP: 169/67 (21 Sep 2023 19:45) (143/78 - 210/81)  BP(mean): 92 (21 Sep 2023 19:45) (89 - 112)  RR: 14 (21 Sep 2023 19:45) (14 - 22)  SpO2: 95% (21 Sep 2023 19:45) (93% - 100%)    Parameters below as of 21 Sep 2023 17:45  Patient On (Oxygen Delivery Method): room air      I&O's Summary    21 Sep 2023 07:01  -  21 Sep 2023 19:54  --------------------------------------------------------  IN: 400 mL / OUT: 780 mL / NET: -380 mL        PHYSICAL EXAM:  Gen: NAD, A&Ox3  Pulm: No respiratory distress, no subcostal retractions  CV: RRR, no JVD  Abd: Soft, appropriately tender, non-distended. Midline prevena in place to good suction. Bilateral EDWIN drains with serosanguinous output. NGT in place to wall suction  : Nicolas in place  Extremities: Warm and well perfused, equal bilateral muscle strength    ASSESSMENT/PLAN:  62 year old female presenting with locally advanced pancreatic cancer s/p chemotherapy now s/p pylorus sparing whipple procedure. Patient tolerated the procedure well and is recovering appropriately.    Plan:  - Whipple pathway  - NPO, NGT  - LR @ 100/hr  - Pain control: PCA, IV tylenol  - Nicolas in place  - Hypertensive in the PACU with SBP of 180-200, getting hydralazine  - DVT ppx: SQH  - Dispo: SICU for hemodynamic monitoring       - Keep Nicolas  - OOB as tolerated  - Incentive spirometry  - DVT prophylaxis: SCD  - Monitor overnight  - Dispo: Discharge home in AM

## 2023-09-25 NOTE — DISCHARGE NOTE PROVIDER - NSDCCPCAREPLAN_GEN_ALL_CORE_FT
PRINCIPAL DISCHARGE DIAGNOSIS  Diagnosis: Malignant neoplasm of pancreas  Assessment and Plan of Treatment:      PRINCIPAL DISCHARGE DIAGNOSIS  Diagnosis: Malignant neoplasm of pancreas  Assessment and Plan of Treatment: See instructions after pancreas surgery      SECONDARY DISCHARGE DIAGNOSES  Diagnosis: Hypertension  Assessment and Plan of Treatment: You were started on a medication called Amlodipine for hypertension. Continue this medication as prescribed. Please follow up with your PCP for BP check within 1-2 weeks following discharge from the hospital.

## 2023-09-25 NOTE — PROGRESS NOTE ADULT - ASSESSMENT
61y/o female with PMHx asthma and neoplasm of pancreas who is s/p pyloric preserving whipple on 9/21.     PLAN:  - Whipple pathway  - BP controlled w/ 10mg amlodipine, labetalol PRN for SBP > 180  - Diet: CLD unlimited  - F/U drain amylase, JP1 pulled  - IVF @42cc/hr  - Pain control: PCA, IV tylenol  - DVT ppx: SQH    D Team Surgery  [id number] 63y/o female with PMHx asthma and neoplasm of pancreas who is s/p pyloric preserving whipple on 9/21.     PLAN:  - Whipple pathway  - Pain control PO Tylenol, Motrin, Oxycodone (once tolerating enough PO); dilaudid for breakthrough; gabapentin  - BP controlled w/ 10mg amlodipine, labetalol PRN for SBP > 180  - IVL  - Diet: Full liquids + Ensure Enlive  - Nutrition consult for post-Whipple nutrition education  - Protonix PO  - Remove EDWIN#2 today  - Hypokalemia, hypophosphatemia repleted  - DVT ppx: SQH    D Team Surgery  [id number]

## 2023-09-25 NOTE — DISCHARGE NOTE PROVIDER - NSPANSURGDSCINFOINCISION_GEN_ALL_CORE
•     You may shower at home. While in the shower allow water to hit your chest or back and run down your incision. Gently wash incision with soap and water and pat dry with a clean towel.  •     Avoid baths, hot tubs and pools until your incision is completely healed.  •     Your incision may feel numb or have decreased sensation. Avoid heating pads as these can burn the skin without you noticing or feeling it.  •     If you develop purulent drainage, redness, swelling, or increased incisional pain please contact your surgeon’s office.   •    Dominic Acevedo (EDWIN) drain site: If you had a EDWIN drain removed, the insertion site will close in a few days. You should clean the area with soap and water in the shower and pat dry. While it is healing you can apply a 4x4 gauze pad to protect your clothes.  Change the gauze pad daily or if it becomes saturated.

## 2023-09-25 NOTE — DISCHARGE NOTE PROVIDER - DETAILS OF MALNUTRITION DIAGNOSIS/DIAGNOSES
This patient has been assessed with a concern for Malnutrition and was treated during this hospitalization for the following Nutrition diagnosis/diagnoses:     -  09/23/2023: Underweight (BMI < 19)

## 2023-09-25 NOTE — DISCHARGE NOTE PROVIDER - CARE PROVIDER_API CALL
Chandler Tolentino [facility]  Surgery  [address], [address] [place] [address]  Phone: [phone]  Fax: [phone]  Follow Up Time: 1 week

## 2023-09-25 NOTE — DISCHARGE NOTE PROVIDER - HOSPITAL COURSE
This patient is a 62F with asthma and pancreatic adenocarcinoma who presented to [facility] on 9/21 for scheduled Whipple.  Patient taken to the OR by Dr. Tolentino and Dr. Daniels and underwent Whipple.  Patient tolerated operation well and there were no post-operative complications identified. Patient transferred to SICU post-operatively for hemodynamic monitoring. Patient had *** for pain control.   9/22 POD1 NGT removed. Patient allowed <30cc water per hour (limited clears). EDWIN Amylases: 486,379.  9/23 POD2 Patient allowed <60cc water per hour (limited clears).  EDWIN Amylases: 354,181.  9/24 POD3 Diet advanced to unlimited clear liquids. EDWIN Amylases:103,63. Anterior EDWIN drain removed.  9/25 POD4 Diet advanced to full liquids. Once patient was tolerating enough PO, pain control was transitioned from IV to PO pain meds. EDWIN amylase 77. Posterior EDWIN drain removed.  9/26 POD5 At this time, patient is currently ambulating, voiding, tolerating a regular diet. Pain well controlled on PO pain meds. Patient has been deemed stable for discharge home with follow up as an outpatient. This patient is a 62F with asthma and pancreatic adenocarcinoma who presented to [facility] on 9/21 for scheduled Whipple.  Patient taken to the OR by Dr. Tolentino and Dr. Daniels and underwent Whipple.  Patient tolerated operation well and there were no post-operative complications identified. Patient transferred to SICU post-operatively for hemodynamic monitoring.   9/22 POD1 NGT removed. Patient allowed <30cc water per hour (limited clears). EDWIN Amylases: 486,379.  9/23 POD2 Patient allowed <60cc water per hour (limited clears).  EDWIN Amylases: 354,181.  9/24 POD3 Diet advanced to unlimited clear liquids. EDWIN Amylases:103,63. Anterior EDWIN drain removed.  9/25 POD4 Diet advanced to full liquids. Once patient was tolerating enough PO, pain control was transitioned from IV to PO pain meds. EDWIN amylase 77. Posterior EDWIN drain removed.  9/26 POD5 Patient resumed on creon as she took prior to surgery. At this time, patient is currently ambulating, voiding, tolerating a regular diet. Pain well controlled on PO pain meds. Patient has been deemed stable for discharge home with follow up as an outpatient.

## 2023-09-25 NOTE — DISCHARGE NOTE PROVIDER - NSDCPANSURGDGE_NGTREINSERTED1_GEN_ALL_CORE
[FreeTextEntry1] : Patient is a 35 year-old being actively treated for testicular cancer. In 2017, he had completed three cycles of chemotherapy. After the first two cycles, he received medi-port in the right chest wall. He got his third round of chemo through this port. He subsequently developed shortness of breath and chest tightness and was found to be in atrial flutter with rapid ventricular rate. He was admitted for rate control and planned TYLOR and DCCV. He was started on anticoagulation, but during the TYLOR, he was noted to have a right atrial thrombus and cardioversion was aborted. He was discharged on Toprol 200mg daily and Xarelto 15mg BID. \par \par At his initial visit here in November, I added Digoxin to his regimen for better rate control. This helped to better control his rate. In December 2017, TYLOR still showed RA thrombus. TYLOR performed in January 2018 showed improvement in thrombus size, and patient was successfully TYLOR/DCCV into sinus rhythm.\par February 5, 2018, he maintains normal sinus rhythm. He was taking a low dose ACEi (lisinopril 2.5mg daily) and Toprol 100mg daily.\par \par June 27, 2018 - patient recently admitted to [facility] for resection of retroperitoneal mass. The procedure was successful and the patient was discharged. He maintains normal sinus rhythm.\par \par Patient restarted chemotherapy and completed three cycles of TIP. He had a left upper extremity PICC line placed, which has been removed.. \par \par November 2018 - Patient has been taken off anticoagulation and guideline directed medical therapy for cardiomyopathy. He was having episodes that he describes as blacking out while getting chemotherapy, which lead to discontinuation of his medication.\par \par January 2019 - Patient presents today in his usual state of health, but he is reporting ongoing fatigue. His only current medications are low dose beta-blocker (carvedilol 3.125 mg BID), gabapentin 900 mg daily, and oxycodone PRN. Several days ago, he experienced bilateral lower extremity edema, L > R, that improved spontaneously. \par \par March 2019 - Patient has fatigue, anxiety, numbness of hands and feet, and blistering of palms and soles. He is tolerating his carvedilol 3.125 mg BID.\par \par May 2019 - Patient has been having labile weight with swelling of his hands and feet.\par May 15, 2019 - Patient has been tolerating carvedilol 12.5 mg BID and Entresto 24-26 mg BID. He gets occasional orthostatic symptoms, but he has not had syncope. He also sometimes gets a dizzy symptom when he lays down. And he reports a dysequilibrium such that if he closes his eyes in the shower, he worries about falling into the wall.\par He continues to require oxycodone for pain relief - this is managed by his pain management physician.\par \par June 2019 - Patient recently hospitalized with acute cholecystitis, now status post lap sen on 6/5/2019, discharged from [facility] on 6/7/2019. Earlier this week, patient went back to [facility] ER with vertigo and vomiting. He was discharged with meclizine. Plan for outpatient MRI brain to evaluate for etiology of vertigo/dysequilibrium.\par \par PMD: Amador Arambula MD [phone]\par Oncologist: Neo Chirinos MD [phone]
No

## 2023-09-25 NOTE — DISCHARGE NOTE PROVIDER - NSDCFUADDINST_GEN_ALL_CORE_FT
BARIATRIC PHASE 3 DIET:  Keep meals small and frequent, about six small meals a day.   Each meal should include about a half-cup to 1 cup of SOFT food.  Drink liquids between meals.   To avoid dehydration, you'll need to drink at least 8 cups of fluids a day. But drinking too much liquid at or around mealtime can leave you feeling overly full and prevent you from eating enough nutrient-rich food.   Avoid carbonated beverages.  Soft foods include: Ground lean meat or poultry Flaked fish Eggs Cottage cheese Cooked or dried cereal Rice  Canned or soft fresh fruit, without seeds or skin Cooked vegetables, without skin

## 2023-09-25 NOTE — DISCHARGE NOTE PROVIDER - NSDCFUSCHEDAPPT_GEN_ALL_CORE_FT
Ryan Candelaria  [facility] Physician [facility]  Scheduled Appointment: 11/13/2023    [facility]  Scheduled Appointment: 11/13/2023

## 2023-09-25 NOTE — DISCHARGE NOTE PROVIDER - NSDCMRMEDTOKEN_GEN_ALL_CORE_FT
acetaminophen 325 mg oral tablet: 3 tab(s) orally every 6 hours  Albuterol (Eqv-ProAir HFA) 90 mcg/inh inhalation aerosol: 2 puff(s) inhaled every 6 hours as needed for  bronchospasm  gabapentin 100 mg oral capsule: 1 cap(s) orally 3 times a day MDD: 3  pantoprazole 40 mg oral delayed release tablet: 1 tab(s) orally once a day (before a meal) MDD: 1  senna leaf extract oral tablet: 2 tab(s) orally once a day (at bedtime) as needed for Constipation MDD: 2  sudafed: 30 mg 1 tab oral every 8 hrs prn  seasonal allergies  Vitamin B Complex: 1 tab oral once a day  Vitamin D: 1 tab oral once a day   acetaminophen 325 mg oral tablet: 3 tab(s) orally every 6 hours  Albuterol (Eqv-ProAir HFA) 90 mcg/inh inhalation aerosol: 2 puff(s) inhaled every 6 hours as needed for  bronchospasm  Creon 12,000 units oral delayed release capsule: 2 cap(s) orally 3 times a day (with meals) 1 capsule with snacks MDD: 8-12  gabapentin 100 mg oral capsule: 1 cap(s) orally 3 times a day MDD: 3  pantoprazole 40 mg oral delayed release tablet: 1 tab(s) orally once a day (before a meal) MDD: 1  senna leaf extract oral tablet: 2 tab(s) orally once a day (at bedtime) as needed for Constipation MDD: 2  sudafed: 30 mg 1 tab oral every 8 hrs prn  seasonal allergies  Vitamin B Complex: 1 tab oral once a day  Vitamin D: 1 tab oral once a day   acetaminophen 325 mg oral tablet: 3 tab(s) orally every 6 hours  Albuterol (Eqv-ProAir HFA) 90 mcg/inh inhalation aerosol: 2 puff(s) inhaled every 6 hours as needed for  bronchospasm  amLODIPine 10 mg oral tablet: 1 tab(s) orally once a day MDD: 1  Creon 12,000 units oral delayed release capsule: 2 cap(s) orally 3 times a day (with meals) 1 capsule with snacks MDD: 8-12  gabapentin 100 mg oral capsule: 1 cap(s) orally 3 times a day MDD: 3  pantoprazole 40 mg oral delayed release tablet: 1 tab(s) orally once a day (before a meal) MDD: 1  senna leaf extract oral tablet: 2 tab(s) orally once a day (at bedtime) as needed for Constipation MDD: 2  sudafed: 30 mg 1 tab oral every 8 hrs prn  seasonal allergies  Vitamin B Complex: 1 tab oral once a day  Vitamin D: 1 tab oral once a day

## 2023-09-25 NOTE — PROGRESS NOTE ADULT - SUBJECTIVE AND OBJECTIVE BOX
D TEAM Surgery Progress Note  Patient is a 62y old  Female who presents with a chief complaint of Malignant neoplasm of pancreas (23 Sep 2023 13:32)    INTERVAL EVENTS: Patient is POD#4 s/p Whipple. No acute events overnight.  SUBJECTIVE: Patient seen and examined at bedside with surgical team, patient without complaints. Denies fever, chills, CP, SOB nausea, vomiting, abdominal pain.    OBJECTIVE:    Vital Signs Last 24 Hrs  T(C): 36.7 (25 Sep 2023 04:27), Max: 37.5 (24 Sep 2023 16:32)  T(F): 98.1 (25 Sep 2023 04:27), Max: 99.5 (24 Sep 2023 16:32)  HR: 66 (25 Sep 2023 04:27) (61 - 81)  BP: 153/73 (25 Sep 2023 04:27) (114/61 - 174/70)  BP(mean): --  RR: 18 (25 Sep 2023 04:27) (16 - 18)  SpO2: 97% (25 Sep 2023 04:27) (94% - 98%)    Parameters below as of 25 Sep 2023 04:27  Patient On (Oxygen Delivery Method): room air    I&O's Detail    23 Sep 2023 07:01  -  24 Sep 2023 07:00  --------------------------------------------------------  IN:    dextrose 5% + sodium chloride 0.45% w/ Additives: 1176 mL    IV PiggyBack: 450 mL  Total IN: 1626 mL    OUT:    Bulb (mL): 70 mL    Bulb (mL): 75 mL    Indwelling Catheter - Urethral (mL): 700 mL    Voided (mL): 1850 mL  Total OUT: 2695 mL    Total NET: -1069 mL      24 Sep 2023 07:01  -  25 Sep 2023 06:07  --------------------------------------------------------  IN:    dextrose 5% + sodium chloride 0.45% w/ Additives: 1050 mL    IV PiggyBack: 200 mL    Oral Fluid: 980 mL  Total IN: 2230 mL    OUT:    Bulb (mL): 7.5 mL    Bulb (mL): 37.5 mL    Voided (mL): 2000 mL  Total OUT: 2045 mL    Total NET: 185 mL      MEDICATIONS  (STANDING):  acetaminophen     Tablet .. 975 milliGRAM(s) Oral every 6 hours  amLODIPine   Tablet 10 milliGRAM(s) Oral daily  dextrose 5% + sodium chloride 0.45% with potassium chloride 20 mEq/L 1000 milliLiter(s) (42 mL/Hr) IV Continuous <Continuous>  dextrose 5%. 1000 milliLiter(s) (100 mL/Hr) IV Continuous <Continuous>  dextrose 5%. 1000 milliLiter(s) (50 mL/Hr) IV Continuous <Continuous>  dextrose 50% Injectable 25 Gram(s) IV Push once  dextrose 50% Injectable 12.5 Gram(s) IV Push once  dextrose 50% Injectable 25 Gram(s) IV Push once  glucagon  Injectable 1 milliGRAM(s) IntraMuscular once  heparin   Injectable 5000 Unit(s) SubCutaneous every 8 hours  HYDROmorphone PCA (1 mG/mL) 30 milliLiter(s) PCA Continuous PCA Continuous  influenza   Vaccine 0.5 milliLiter(s) IntraMuscular once  insulin lispro (ADMELOG) corrective regimen sliding scale   SubCutaneous three times a day before meals  pantoprazole  Injectable 40 milliGRAM(s) IV Push daily    MEDICATIONS  (PRN):  bisacodyl Suppository 10 milliGRAM(s) Rectal daily PRN Constipation  dextrose Oral Gel 15 Gram(s) Oral once PRN Blood Glucose LESS THAN 70 milliGRAM(s)/deciliter  HYDROmorphone PCA (1 mG/mL) Rescue Clinician Bolus 0.5 milliGRAM(s) IV Push every 15 minutes PRN for Pain Scale GREATER THAN 6  labetalol 200 milliGRAM(s) Oral every 12 hours PRN SBP > 180  naloxone Injectable 0.1 milliGRAM(s) IV Push every 3 minutes PRN For ANY of the following changes in patient status:  A. RR LESS THAN 10 breaths per minute, B. Oxygen saturation LESS THAN 90%, C. Sedation score of 6  ondansetron   Disintegrating Tablet 4 milliGRAM(s) Oral every 6 hours PRN Nausea and/or Vomiting  senna 2 Tablet(s) Oral at bedtime PRN Constipation      PHYSICAL EXAM:  Constitutional: A&Ox3, NAD  Respiratory: Unlabored breathing  Abdomen: Soft, nondistended, NTTP. No rebound or guarding. Midline incision with Prevena vac in place. EDWIN drain (1) with serosanguinous output.  Extremities: WWP, CLEMENS spontaneously    LABS:                        9.7    6.70  )-----------( 169      ( 24 Sep 2023 05:23 )             29.3     09-24    136  |  100  |  6<L>  ----------------------------<  114<H>  3.4<L>   |  23  |  0.46<L>    Ca    8.8      24 Sep 2023 05:23  Phos  2.5     09-24  Mg     1.90     09-24    TPro  6.0  /  Alb  3.5  /  TBili  0.8  /  DBili  x   /  AST  42<H>  /  ALT  89<H>  /  AlkPhos  85  09-23    PT/INR - ( 23 Sep 2023 06:50 )   PT: 9.6 sec;   INR: <0.90 ratio         PTT - ( 23 Sep 2023 06:50 )  PTT:30.7 sec  LIVER FUNCTIONS - ( 23 Sep 2023 06:50 )  Alb: 3.5 g/dL / Pro: 6.0 g/dL / ALK PHOS: 85 U/L / ALT: 89 U/L / AST: 42 U/L / GGT: x           Urinalysis Basic - ( 24 Sep 2023 05:23 )    Color: x / Appearance: x / SG: x / pH: x  Gluc: 114 mg/dL / Ketone: x  / Bili: x / Urobili: x   Blood: x / Protein: x / Nitrite: x   Leuk Esterase: x / RBC: x / WBC x   Sq Epi: x / Non Sq Epi: x / Bacteria: x          IMAGING:     D TEAM Surgery Progress Note  Patient is a 62y old  Female who presents with a chief complaint of Malignant neoplasm of pancreas (23 Sep 2023 13:32)    INTERVAL EVENTS: Patient is POD#4 s/p Whipple. No acute events overnight.    SUBJECTIVE: Patient seen and examined at bedside with surgical team, patient without complaints. Abdominal pain is well controlled. Tolerating clear liquids without nausea, vomiting. Passing flatus and having bowel movements. Has been OOB and ambulating. Denies fever, chills, CP, SOB.    OBJECTIVE:    Vital Signs Last 24 Hrs  T(C): 36.7 (25 Sep 2023 04:27), Max: 37.5 (24 Sep 2023 16:32)  T(F): 98.1 (25 Sep 2023 04:27), Max: 99.5 (24 Sep 2023 16:32)  HR: 66 (25 Sep 2023 04:27) (61 - 81)  BP: 153/73 (25 Sep 2023 04:27) (114/61 - 174/70)  BP(mean): --  RR: 18 (25 Sep 2023 04:27) (16 - 18)  SpO2: 97% (25 Sep 2023 04:27) (94% - 98%)    Parameters below as of 25 Sep 2023 04:27  Patient On (Oxygen Delivery Method): room air    I&O's Detail    23 Sep 2023 07:01  -  24 Sep 2023 07:00  --------------------------------------------------------  IN:    dextrose 5% + sodium chloride 0.45% w/ Additives: 1176 mL    IV PiggyBack: 450 mL  Total IN: 1626 mL    OUT:    Bulb (mL): 70 mL    Bulb (mL): 75 mL    Indwelling Catheter - Urethral (mL): 700 mL    Voided (mL): 1850 mL  Total OUT: 2695 mL    Total NET: -1069 mL      24 Sep 2023 07:01  -  25 Sep 2023 06:07  --------------------------------------------------------  IN:    dextrose 5% + sodium chloride 0.45% w/ Additives: 1050 mL    IV PiggyBack: 200 mL    Oral Fluid: 980 mL  Total IN: 2230 mL    OUT:    Bulb (mL): 7.5 mL    Bulb (mL): 37.5 mL    Voided (mL): 2000 mL  Total OUT: 2045 mL    Total NET: 185 mL      MEDICATIONS  (STANDING):  acetaminophen     Tablet .. 975 milliGRAM(s) Oral every 6 hours  amLODIPine   Tablet 10 milliGRAM(s) Oral daily  dextrose 5% + sodium chloride 0.45% with potassium chloride 20 mEq/L 1000 milliLiter(s) (42 mL/Hr) IV Continuous <Continuous>  dextrose 5%. 1000 milliLiter(s) (100 mL/Hr) IV Continuous <Continuous>  dextrose 5%. 1000 milliLiter(s) (50 mL/Hr) IV Continuous <Continuous>  dextrose 50% Injectable 25 Gram(s) IV Push once  dextrose 50% Injectable 12.5 Gram(s) IV Push once  dextrose 50% Injectable 25 Gram(s) IV Push once  glucagon  Injectable 1 milliGRAM(s) IntraMuscular once  heparin   Injectable 5000 Unit(s) SubCutaneous every 8 hours  HYDROmorphone PCA (1 mG/mL) 30 milliLiter(s) PCA Continuous PCA Continuous  influenza   Vaccine 0.5 milliLiter(s) IntraMuscular once  insulin lispro (ADMELOG) corrective regimen sliding scale   SubCutaneous three times a day before meals  pantoprazole  Injectable 40 milliGRAM(s) IV Push daily    MEDICATIONS  (PRN):  bisacodyl Suppository 10 milliGRAM(s) Rectal daily PRN Constipation  dextrose Oral Gel 15 Gram(s) Oral once PRN Blood Glucose LESS THAN 70 milliGRAM(s)/deciliter  HYDROmorphone PCA (1 mG/mL) Rescue Clinician Bolus 0.5 milliGRAM(s) IV Push every 15 minutes PRN for Pain Scale GREATER THAN 6  labetalol 200 milliGRAM(s) Oral every 12 hours PRN SBP > 180  naloxone Injectable 0.1 milliGRAM(s) IV Push every 3 minutes PRN For ANY of the following changes in patient status:  A. RR LESS THAN 10 breaths per minute, B. Oxygen saturation LESS THAN 90%, C. Sedation score of 6  ondansetron   Disintegrating Tablet 4 milliGRAM(s) Oral every 6 hours PRN Nausea and/or Vomiting  senna 2 Tablet(s) Oral at bedtime PRN Constipation      PHYSICAL EXAM:  Constitutional: A&Ox3, NAD  Respiratory: Unlabored breathing  Abdomen: Soft, nondistended, NTTP. No rebound or guarding. Midline incision with Prevena vac in place. EDWIN drain (1) with serosanguinous output.  Extremities: WWP, CLEMENS spontaneously    LABS:                        9.7    6.70  )-----------( 169      ( 24 Sep 2023 05:23 )             29.3     09-24    136  |  100  |  6<L>  ----------------------------<  114<H>  3.4<L>   |  23  |  0.46<L>    Ca    8.8      24 Sep 2023 05:23  Phos  2.5     09-24  Mg     1.90     09-24    TPro  6.0  /  Alb  3.5  /  TBili  0.8  /  DBili  x   /  AST  42<H>  /  ALT  89<H>  /  AlkPhos  85  09-23    PT/INR - ( 23 Sep 2023 06:50 )   PT: 9.6 sec;   INR: <0.90 ratio         PTT - ( 23 Sep 2023 06:50 )  PTT:30.7 sec  LIVER FUNCTIONS - ( 23 Sep 2023 06:50 )  Alb: 3.5 g/dL / Pro: 6.0 g/dL / ALK PHOS: 85 U/L / ALT: 89 U/L / AST: 42 U/L / GGT: x           Urinalysis Basic - ( 24 Sep 2023 05:23 )    Color: x / Appearance: x / SG: x / pH: x  Gluc: 114 mg/dL / Ketone: x  / Bili: x / Urobili: x   Blood: x / Protein: x / Nitrite: x   Leuk Esterase: x / RBC: x / WBC x   Sq Epi: x / Non Sq Epi: x / Bacteria: x

## 2023-09-25 NOTE — PROGRESS NOTE ADULT - SUBJECTIVE AND OBJECTIVE BOX
Anesthesia Pain Management Service    SUBJECTIVE: Patient is doing well with IV PCA and no significant problems reported.    Pain Scale Score	At rest: ___ 	With Activity: ___ 	[X ] Refer to charted pain scores    THERAPY:    [ ] IV PCA Morphine		[ ] 5 mg/mL	[ ] 1 mg/mL  [X ] IV PCA Hydromorphone	[ ] 5 mg/mL	[X ] 1 mg/mL  [ ] IV PCA Fentanyl		[ ] 50 micrograms/mL    Demand dose __0.2_ lockout __6_ (minutes) Continuous Rate _0__ Total: __0.4_   mg used (in past 24 hrs)      MEDICATIONS  (STANDING):  acetaminophen     Tablet .. 975 milliGRAM(s) Oral every 6 hours  amLODIPine   Tablet 10 milliGRAM(s) Oral daily  gabapentin 100 milliGRAM(s) Oral three times a day  heparin   Injectable 5000 Unit(s) SubCutaneous every 8 hours  ibuprofen  Tablet. 600 milliGRAM(s) Oral every 6 hours  influenza   Vaccine 0.5 milliLiter(s) IntraMuscular once  insulin lispro (ADMELOG) corrective regimen sliding scale   SubCutaneous three times a day before meals  pantoprazole    Tablet 40 milliGRAM(s) Oral before breakfast  potassium chloride    Tablet ER 20 milliEquivalent(s) Oral two times a day    MEDICATIONS  (PRN):  bisacodyl Suppository 10 milliGRAM(s) Rectal daily PRN Constipation  HYDROmorphone  Injectable 0.5 milliGRAM(s) IV Push every 3 hours PRN Severe Pain (7 - 10)  labetalol 200 milliGRAM(s) Oral every 12 hours PRN SBP > 180  oxyCODONE    IR 5 milliGRAM(s) Oral every 4 hours PRN Severe Pain (7 - 10)  oxyCODONE    IR 2.5 milliGRAM(s) Oral every 4 hours PRN Moderate Pain (4 - 6)  senna 2 Tablet(s) Oral at bedtime PRN Constipation      OBJECTIVE: Patient sitting in bed.    Sedation Score:	[ X] Alert	[ ] Drowsy 	[ ] Arousable	[ ] Asleep	[ ] Unresponsive    Side Effects:	[X ] None	[ ] Nausea	[ ] Vomiting	[ ] Pruritus  		[ ] Other:    Vital Signs Last 24 Hrs  T(C): 37.1 (25 Sep 2023 08:38), Max: 37.5 (24 Sep 2023 16:32)  T(F): 98.8 (25 Sep 2023 08:38), Max: 99.5 (24 Sep 2023 16:32)  HR: 78 (25 Sep 2023 08:38) (61 - 78)  BP: 143/59 (25 Sep 2023 08:38) (141/64 - 174/70)  BP(mean): --  RR: 18 (25 Sep 2023 08:38) (16 - 18)  SpO2: 98% (25 Sep 2023 08:38) (94% - 98%)    Parameters below as of 25 Sep 2023 08:38  Patient On (Oxygen Delivery Method): room air        ASSESSMENT/ PLAN    Therapy to  be:	[ ] Continue   [ X] Discontinued   [X ] Change to prn Analgesics    Documentation and Verification of current medications:   [X] Done	[ ] Not done, not elligible    Comments: IV PCA discontinued by primary team. PRN Oral/IV opioids and/or Adjuvant non-opioid medication to be ordered at this point.    Progress Note written now but Patient was seen earlier.

## 2023-09-26 NOTE — PROGRESS NOTE ADULT - NUTRITIONAL ASSESSMENT
This patient has been assessed with a concern for Malnutrition and has been determined to have a diagnosis/diagnoses of Underweight (BMI < 19).    This patient is being managed with:   Diet Consistent Carbohydrate Clear Liquid-  Entered: Sep 24 2023 10:51AM  
This patient has been assessed with a concern for Malnutrition and has been determined to have a diagnosis/diagnoses of Underweight (BMI < 19).    This patient is being managed with:   Diet Consistent Carbohydrate Clear Liquid-  Entered: Sep 24 2023 10:51AM  
This patient has been assessed with a concern for Malnutrition and has been determined to have a diagnosis/diagnoses of Underweight (BMI < 19).    This patient is being managed with:   Diet Full Liquid-  No Carbonated Beverages  Supplement Feeding Modality:  Oral  Ensure Enlive Cans or Servings Per Day:  1       Frequency:  Two Times a day  Entered: Sep 25 2023  8:19AM

## 2023-09-26 NOTE — DISCHARGE NOTE NURSING/CASE MANAGEMENT/SOCIAL WORK - PATIENT PORTAL LINK FT
You can access the FollowMyHealth Patient Portal offered by [facility] by registering at the following website: [email or web address]. By joining [facility]’s FollowMyHealth portal, you will also be able to view your health information using other applications (apps) compatible with our system.

## 2023-09-26 NOTE — PROGRESS NOTE ADULT - ASSESSMENT
63y/o female with PMHx asthma and neoplasm of pancreas who is s/p pyloric preserving whipple on 9/21.     PLAN:  - Whipple pathway  - Pain control PO Tylenol, Motrin, Oxycodone (once tolerating enough PO); dilaudid for breakthrough; gabapentin  - BP controlled w/ 10mg amlodipine, labetalol PRN for SBP > 180  - IVL  - Diet: Full liquids + Ensure Enlive  - Nutrition consult for post-Whipple nutrition education  - Protonix PO  - Hypokalemia, hypophosphatemia repleted  - DVT ppx: SQH    D Team Surgery  [id number]

## 2023-09-26 NOTE — PROGRESS NOTE ADULT - PROVIDER SPECIALTY LIST ADULT
Anesthesia
Pain Medicine
SICU
Pain Medicine
Pain Medicine
Surgery
Surgery
SICU
Surgery

## 2023-09-26 NOTE — PROGRESS NOTE ADULT - SUBJECTIVE AND OBJECTIVE BOX
D TEAM Surgery Progress Note  Patient is a 62y old  Female who presents with a chief complaint of Pancreatic adenocarcinoma (25 Sep 2023 12:20)    INTERVAL EVENTS: Patient is POD#5 s/p Whipple. No acute events overnight.    SUBJECTIVE: Patient seen and examined at bedside with surgical team, patient without complaints. Abdominal pain is well controlled. Tolerating clear liquids without nausea, vomiting. Passing flatus and having bowel movements. Has been OOB and ambulating. Denies fever, chills, CP, SOB.      OBJECTIVE:    Vital Signs Last 24 Hrs  T(C): 37.2 (26 Sep 2023 05:37), Max: 37.2 (26 Sep 2023 05:37)  T(F): 99 (26 Sep 2023 05:37), Max: 99 (26 Sep 2023 05:37)  HR: 82 (26 Sep 2023 05:37) (66 - 86)  BP: 165/73 (26 Sep 2023 05:37) (134/43 - 165/73)  BP(mean): --  RR: 18 (26 Sep 2023 05:37) (18 - 18)  SpO2: 96% (26 Sep 2023 05:37) (96% - 98%)    Parameters below as of 26 Sep 2023 05:37  Patient On (Oxygen Delivery Method): room air    I&O's Detail    24 Sep 2023 07:01  -  25 Sep 2023 07:00  --------------------------------------------------------  IN:    dextrose 5% + sodium chloride 0.45% w/ Additives: 1050 mL    IV PiggyBack: 200 mL    Oral Fluid: 980 mL  Total IN: 2230 mL    OUT:    Bulb (mL): 7.5 mL    Bulb (mL): 37.5 mL    Voided (mL): 2350 mL  Total OUT: 2395 mL    Total NET: -165 mL      25 Sep 2023 07:01  -  26 Sep 2023 06:02  --------------------------------------------------------  IN:    dextrose 5% + sodium chloride 0.45% w/ Additives: 168 mL    Oral Fluid: 2118 mL  Total IN: 2286 mL    OUT:    Bulb (mL): 3 mL    Voided (mL): 400 mL  Total OUT: 403 mL    Total NET: 1883 mL      MEDICATIONS  (STANDING):  acetaminophen     Tablet .. 975 milliGRAM(s) Oral every 6 hours  amLODIPine   Tablet 10 milliGRAM(s) Oral daily  gabapentin 100 milliGRAM(s) Oral three times a day  heparin   Injectable 5000 Unit(s) SubCutaneous every 8 hours  influenza   Vaccine 0.5 milliLiter(s) IntraMuscular once  pantoprazole    Tablet 40 milliGRAM(s) Oral before breakfast    MEDICATIONS  (PRN):  HYDROmorphone  Injectable 0.5 milliGRAM(s) IV Push every 3 hours PRN Severe Pain (7 - 10)  ibuprofen  Tablet. 600 milliGRAM(s) Oral every 6 hours PRN Mild Pain (1 - 3)  labetalol 200 milliGRAM(s) Oral every 12 hours PRN SBP > 180  oxyCODONE    IR 5 milliGRAM(s) Oral every 4 hours PRN Severe Pain (7 - 10)  oxyCODONE    IR 2.5 milliGRAM(s) Oral every 4 hours PRN Moderate Pain (4 - 6)      PHYSICAL EXAM:  Constitutional: A&Ox3, NAD  Respiratory: Unlabored breathing  Abdomen: Soft, nondistended, NTTP. No rebound or guarding. Midline incision with Prevena vac in place. Previous drain sites C/D/I/  Extremities: WWP, CLEMENS spontaneously    LABS:                        10.5   6.20  )-----------( 200      ( 25 Sep 2023 05:50 )             32.1     09-25    138  |  99  |  7   ----------------------------<  107<H>  3.5   |  22  |  0.52    Ca    8.6      25 Sep 2023 05:50  Phos  2.7     09-25  Mg     2.20     09-25          Urinalysis Basic - ( 25 Sep 2023 05:50 )    Color: x / Appearance: x / SG: x / pH: x  Gluc: 107 mg/dL / Ketone: x  / Bili: x / Urobili: x   Blood: x / Protein: x / Nitrite: x   Leuk Esterase: x / RBC: x / WBC x   Sq Epi: x / Non Sq Epi: x / Bacteria: x           D TEAM Surgery Progress Note  Patient is a 62y old  Female who presents with a chief complaint of Pancreatic adenocarcinoma (25 Sep 2023 12:20)    INTERVAL EVENTS: Patient is POD#5 s/p Whipple. No acute events overnight.    SUBJECTIVE: Patient seen and examined at bedside with surgical team, patient without complaints. Abdominal pain is "ok". Tolerating clear liquids without nausea, vomiting. Passing flatus and having bowel movements. Has been OOB and ambulating. Denies fever, chills, CP, SOB.      OBJECTIVE:    Vital Signs Last 24 Hrs  T(C): 37.2 (26 Sep 2023 05:37), Max: 37.2 (26 Sep 2023 05:37)  T(F): 99 (26 Sep 2023 05:37), Max: 99 (26 Sep 2023 05:37)  HR: 82 (26 Sep 2023 05:37) (66 - 86)  BP: 165/73 (26 Sep 2023 05:37) (134/43 - 165/73)  BP(mean): --  RR: 18 (26 Sep 2023 05:37) (18 - 18)  SpO2: 96% (26 Sep 2023 05:37) (96% - 98%)    Parameters below as of 26 Sep 2023 05:37  Patient On (Oxygen Delivery Method): room air    I&O's Detail    24 Sep 2023 07:01  -  25 Sep 2023 07:00  --------------------------------------------------------  IN:    dextrose 5% + sodium chloride 0.45% w/ Additives: 1050 mL    IV PiggyBack: 200 mL    Oral Fluid: 980 mL  Total IN: 2230 mL    OUT:    Bulb (mL): 7.5 mL    Bulb (mL): 37.5 mL    Voided (mL): 2350 mL  Total OUT: 2395 mL    Total NET: -165 mL      25 Sep 2023 07:01  -  26 Sep 2023 06:02  --------------------------------------------------------  IN:    dextrose 5% + sodium chloride 0.45% w/ Additives: 168 mL    Oral Fluid: 2118 mL  Total IN: 2286 mL    OUT:    Bulb (mL): 3 mL    Voided (mL): 400 mL  Total OUT: 403 mL    Total NET: 1883 mL      MEDICATIONS  (STANDING):  acetaminophen     Tablet .. 975 milliGRAM(s) Oral every 6 hours  amLODIPine   Tablet 10 milliGRAM(s) Oral daily  gabapentin 100 milliGRAM(s) Oral three times a day  heparin   Injectable 5000 Unit(s) SubCutaneous every 8 hours  influenza   Vaccine 0.5 milliLiter(s) IntraMuscular once  pantoprazole    Tablet 40 milliGRAM(s) Oral before breakfast    MEDICATIONS  (PRN):  HYDROmorphone  Injectable 0.5 milliGRAM(s) IV Push every 3 hours PRN Severe Pain (7 - 10)  ibuprofen  Tablet. 600 milliGRAM(s) Oral every 6 hours PRN Mild Pain (1 - 3)  labetalol 200 milliGRAM(s) Oral every 12 hours PRN SBP > 180  oxyCODONE    IR 5 milliGRAM(s) Oral every 4 hours PRN Severe Pain (7 - 10)  oxyCODONE    IR 2.5 milliGRAM(s) Oral every 4 hours PRN Moderate Pain (4 - 6)      PHYSICAL EXAM:  Constitutional: A&Ox3, NAD  Respiratory: Unlabored breathing  Abdomen: Soft, nondistended, NTTP. No rebound or guarding. Midline incision c/d/i with Prevena vac removed. Previous drain sites C/D/I/  Extremities: WWP, CLEMENS spontaneously    LABS:             CBC (09-26 @ 06:42)                              11.4<L>                         8.02    )----------------(  249        --    % Neutrophils, --    % Lymphocytes, ANC: --                                  33.4<L>  CBC (09-25 @ 05:50)                              10.5<L>                         6.20    )----------------(  200        --    % Neutrophils, --    % Lymphocytes, ANC: --                                  32.1<L>    BMP (09-25 @ 05:50)             138     |  99      |  7     		Ca++ --      Ca 8.6                ---------------------------------( 107<H>		Mg 2.20               3.5     |  22      |  0.52  			Ph 2.7       Urinalysis (09-25 @ 05:50):     Color:  / Appearance:  / SG:  / pH:  / Gluc: 107<H> / Ketones:  / Bili:  / Urobili:  / Protein : / Nitrites:  / Leuk.Est:  / RBC:  / WBC:  / Sq Epi:  / Non Sq Epi:  / Bacteria        -> .Other BILE DUCT CULTURE Culture (09-21 @ 11:26)       No polymorphonuclear cells seen per low power field  No organisms seen per oil power field    Escherichia coli ESBL    Culture is being performed. Fungal cultures are held for 4 weeks.

## 2023-09-27 PROBLEM — J30.2 OTHER SEASONAL ALLERGIC RHINITIS: Chronic | Status: ACTIVE | Noted: 2023-01-01

## 2023-09-27 PROBLEM — C25.9 MALIGNANT NEOPLASM OF PANCREAS, UNSPECIFIED: Chronic | Status: ACTIVE | Noted: 2023-01-01

## 2023-09-27 PROBLEM — J45.909 UNSPECIFIED ASTHMA, UNCOMPLICATED: Chronic | Status: ACTIVE | Noted: 2023-01-01

## 2023-10-02 PROBLEM — C25.9 ADENOCARCINOMA OF PANCREAS: Status: ACTIVE | Noted: 2022-10-13

## 2023-10-19 NOTE — RESULTS/DATA
Endoscopy [FreeTextEntry1] : Ms. Nagel is a pleasant 61 year-old woman with a newly diagnosed unresectable pancreatic cancer (at least T2), on FOLFIRINOX. Patient being seen as per physician's primary plan of care.\par

## 2023-10-20 NOTE — CONSULT NOTE ADULT - CONSULT REASON
Medical management
esbl ecoli sepsis/bacteremia  abd collection   uti
Acute Thrombocytopenia , Pancreatic CA
rp collection

## 2023-10-20 NOTE — CONSULT NOTE ADULT - NS ATTEND AMEND GEN_ALL_CORE FT
62 year old woman with fluid collection s/p whipple.     Per surgery, IR evaluated and difficult to sample collection (infection, ascites)  Serial imaging, weekly CT.   May need MRCP with secretin next week to evaluate for ongoing leakage.   Consider octreotide  Nutrition with PPN but may need NJ tube if prolonged NPO.   Will re-evaluate after the weekend.

## 2023-10-20 NOTE — PROGRESS NOTE ADULT - SUBJECTIVE AND OBJECTIVE BOX
Patient is a 62y old  Female who presents with a chief complaint of [facility] Transfer for Peripancreatic Fluid Collection S/p Whipple 09/2023 (20 Oct 2023 09:47)      BRIEF HOSPITAL COURSE: ***    Events last 24 hours: ***    PAST MEDICAL & SURGICAL HISTORY:  Malignant neoplasm of pancreas      Seasonal allergies      Asthma      S/p left hip fracture      History of infusaport central venous catheter insertion      S/P ERCP      H/O Whipple procedure            Medications:  meropenem Injectable 1000 milliGRAM(s) IV Push every 8 hours    amLODIPine   Tablet 10 milliGRAM(s) Oral daily    albuterol    90 MICROgram(s) HFA Inhaler 2 Puff(s) Inhalation every 6 hours PRN    acetaminophen   IVPB .. 650 milliGRAM(s) IV Intermittent every 6 hours PRN  gabapentin 100 milliGRAM(s) Oral three times a day  morphine  - Injectable 2 milliGRAM(s) IV Push every 4 hours PRN  ondansetron Injectable 4 milliGRAM(s) IV Push every 6 hours PRN        pantoprazole  Injectable 40 milliGRAM(s) IV Push daily  senna 2 Tablet(s) Oral at bedtime PRN        dextrose 5% + sodium chloride 0.9% 1000 milliLiter(s) IV Continuous <Continuous>  potassium chloride  10 mEq/100 mL IVPB 10 milliEquivalent(s) IV Intermittent every 1 hour    influenza   Vaccine 0.5 milliLiter(s) IntraMuscular once              ICU Vital Signs Last 24 Hrs  T(C): 37.1 (20 Oct 2023 09:20), Max: 37.1 (20 Oct 2023 09:20)  T(F): 98.7 (20 Oct 2023 09:20), Max: 98.7 (20 Oct 2023 09:20)  HR: 76 (20 Oct 2023 10:00) (62 - 76)  BP: 132/62 (20 Oct 2023 10:00) (115/84 - 140/67)  BP(mean): 84 (20 Oct 2023 10:00) (80 - 94)  ABP: --  ABP(mean): --  RR: --  SpO2: 100% (20 Oct 2023 09:00) (97% - 100%)            I&O's Detail        LABS:                        8.1    7.47  )-----------( 14       ( 20 Oct 2023 08:27 )             23.5     10-20    136  |  104  |  11  ----------------------------<  133<H>  2.6<LL>   |  24  |  0.37<L>    Ca    6.9<L>      20 Oct 2023 08:27  Phos  3.7     10-20  Mg     2.0     10-20    TPro  4.2<L>  /  Alb  1.4<L>  /  TBili  0.9  /  DBili  x   /  AST  26  /  ALT  16  /  AlkPhos  138<H>  10-20          CAPILLARY BLOOD GLUCOSE        PT/INR - ( 20 Oct 2023 05:26 )   PT: 12.4 sec;   INR: 1.10 ratio         PTT - ( 20 Oct 2023 05:26 )  PTT:29.3 sec  Urinalysis Basic - ( 20 Oct 2023 08:27 )    Color: x / Appearance: x / SG: x / pH: x  Gluc: 133 mg/dL / Ketone: x  / Bili: x / Urobili: x   Blood: x / Protein: x / Nitrite: x   Leuk Esterase: x / RBC: x / WBC x   Sq Epi: x / Non Sq Epi: x / Bacteria: x      CULTURES:      Physical Examination:    General: No acute distress.    HEENT: Pupils equal, reactive to light.  Symmetric.  PULM: Clear to auscultation bilaterally, no significant sputum production  NECK: Supple, no lymphadenopathy, trachea midline  CVS: Regular rate and rhythm, no murmurs, rubs, or gallop  ABD: Soft, nondistended, nontender, midline incision clean and dry     EXT: No edema, nontender    SKIN: Warm and well perfused, no rashes noted.    NEURO: Alert, oriented, interactive, nonfocal    DEVICES:     RADIOLOGY: ***    CRITICAL CARE TIME SPENT: ***   Patient is a 62y old  Female who presents with a chief complaint of [facility] Transfer for Peripancreatic Fluid Collection S/p Whipple 09/2023 (20 Oct 2023 09:47)      BRIEF HOSPITAL COURSE:  62 year old F transfered from [facility], pt with hx of pancreatic cancer, s/p 6 months of neoadjuvant FOLFIRINOX chemotherapy and pre-operative RT (completed about 2 months prior) followed by Whipple resection with Dr. Tolentino on 9/21/23. Her daughter called 911   because she has not been well for 2 weeks, in bed, not eating well. She had a lot of diarrhea post the surgery.. CT scan of the chest abd pelvis at [facility] shows emphysema in lungs/ without infiltrates, small retropancreatic fluid collection 4 x 5.6x 1.7cm and small fluid collection around subhepatic space. As per [facility] notes, there was a RRT for hypoxic and tachycardia round to be in rapid Aflutter- resolved with medical mgmt, WBC increased 25k. Also on admission. [facility], pt had WBC 9.7k platelet count 32 (on 10/7/23 platelet count was 433). Hgb 9.7.   TTE 10/19 revealing EF 35-40%, severe MR.     10/20 pt sitting comfortably in bed, denies abd pain, nausea, sob, chest pain, dizziness. denies hx of low platelets. sees oncologist Dr. Medrano as outpatient    PAST MEDICAL & SURGICAL HISTORY:  Malignant neoplasm of pancreas  Seasonal allergies  Asthma  S/p left hip fracture  History of infusaport central venous catheter insertion  S/P ERCP  H/O Whipple procedure      Medications:  meropenem Injectable 1000 milliGRAM(s) IV Push every 8 hours  amLODIPine   Tablet 10 milliGRAM(s) Oral daily  albuterol    90 MICROgram(s) HFA Inhaler 2 Puff(s) Inhalation every 6 hours PRN  acetaminophen   IVPB .. 650 milliGRAM(s) IV Intermittent every 6 hours PRN  gabapentin 100 milliGRAM(s) Oral three times a day  morphine  - Injectable 2 milliGRAM(s) IV Push every 4 hours PRN  ondansetron Injectable 4 milliGRAM(s) IV Push every 6 hours PRN  pantoprazole  Injectable 40 milliGRAM(s) IV Push daily  senna 2 Tablet(s) Oral at bedtime PRN  dextrose 5% + sodium chloride 0.9% 1000 milliLiter(s) IV Continuous <Continuous>  potassium chloride  10 mEq/100 mL IVPB 10 milliEquivalent(s) IV Intermittent every 1 hour  influenza   Vaccine 0.5 milliLiter(s) IntraMuscular once      ICU Vital Signs Last 24 Hrs  T(C): 37.1 (20 Oct 2023 09:20), Max: 37.1 (20 Oct 2023 09:20)  T(F): 98.7 (20 Oct 2023 09:20), Max: 98.7 (20 Oct 2023 09:20)  HR: 76 (20 Oct 2023 10:00) (62 - 76)  BP: 132/62 (20 Oct 2023 10:00) (115/84 - 140/67)  BP(mean): 84 (20 Oct 2023 10:00) (80 - 94)  ABP: --  ABP(mean): --  RR: --  SpO2: 100% (20 Oct 2023 09:00) (97% - 100%)      LABS:                        8.1    7.47  )-----------( 14       ( 20 Oct 2023 08:27 )             23.5     10-20    136  |  104  |  11  ----------------------------<  133<H>  2.6<LL>   |  24  |  0.37<L>    Ca    6.9<L>      20 Oct 2023 08:27  Phos  3.7     10-20  Mg     2.0     10-20    TPro  4.2<L>  /  Alb  1.4<L>  /  TBili  0.9  /  DBili  x   /  AST  26  /  ALT  16  /  AlkPhos  138<H>  10-20    CAPILLARY BLOOD GLUCOSE    PT/INR - ( 20 Oct 2023 05:26 )   PT: 12.4 sec;   INR: 1.10 ratio    PTT - ( 20 Oct 2023 05:26 )  PTT:29.3 sec  Urinalysis Basic - ( 20 Oct 2023 08:27 )    Color: x / Appearance: x / SG: x / pH: x  Gluc: 133 mg/dL / Ketone: x  / Bili: x / Urobili: x   Blood: x / Protein: x / Nitrite: x   Leuk Esterase: x / RBC: x / WBC x   Sq Epi: x / Non Sq Epi: x / Bacteria: x    CULTURES:    Physical Examination:    General: No acute distress.    HEENT: Pupils equal, reactive to light.  Symmetric.  PULM: Clear to auscultation bilaterally, no wheezing  CVS: Regular rate and rhythm, no murmurs heard on ausculatation  ABD: Soft, nondistended, nontender, midline incision clean and dry   EXT: No LE  edema, calf nontender  SKIN: Warm and well perfused, no bruising or ecchymosis  NEURO: Alert, oriented, interactive    DEVICES:     RADIOLOGY:   PROCEDURE DATE:  10/17/2023  INTERPRETATION:  CLINICAL INFORMATION: Abdominal pain and dyspnea, recent Whipple procedure.  COMPARISON: CT abdomen/pelvis September 18, 2023, CT chest June 06, 2023  CONTRAST/COMPLICATIONS:  IV Contrast: Omnipaque 350  90 cc administered   10 cc discarded  Oral Contrast: NONE  Complications: None reported at time of study completion    PROCEDURE:  CT Angiography of the Chest was performed followed by portal venous phase imaging of the Abdomen and Pelvis.  Sagittal and coronal reformats were performed as well as 3D (MIP) reconstructions.    FINDINGS:  CHEST:  LUNGS AND LARGE AIRWAYS: Patent central airways. No pulmonary nodule or lung consolidation. Emphysema.  PLEURA: No pleural effusion.  VESSELS: Within normal limits.  HEART: Heart size is normal. No pericardial effusion.  MEDIASTINUM AND NITESH: No lymphadenopathy.  CHEST WALL AND LOWER NECK: Within normal limits.    ABDOMEN AND PELVIS:  LIVER: Within normal limits.  BILE DUCTS: Pneumobilia. No ductal dilatation.  GALLBLADDER: Cholecystectomy.  SPLEEN: Within normal limits.  PANCREAS: Interval Whipple procedure with unremarkable pancreatic remnant, pancreatic duct stent in place with no ductal dilatation. Fluid collection posterior to the pancreas and duodenum and measures 4.0 x 5.6 x 1.7 cm (TR, CC, AP).  ADRENALS: Within normal limits.  KIDNEYS/URETERS: Within normal limits.    BLADDER: Within normal limits.  REPRODUCTIVE ORGANS: Uterus and adnexa within normal limits.    BOWEL: No bowel obstruction. Appendix is normal. Status post gastrojejunostomy which appears intact.  PERITONEUM: Small amount of fluid in the subhepatic space anterior to the pancreatic remnant.  VESSELS: Patent portal, superior mesenteric and hepatic veins. Note that the SMV is narrowed by the retropancreatic fluid collection.  RETROPERITONEUM/LYMPH NODES: No lymphadenopathy.  ABDOMINAL WALL: Within normal limits.  BONES: Left hip open reduction internal fixation with 3 screws in place.    IMPRESSION:  No pulmonary embolism or other acute chest pathology.    Status post Whipple procedure with small retropancreatic fluid collection and small amount of fluid in the subhepatic space.

## 2023-10-20 NOTE — PHYSICAL THERAPY INITIAL EVALUATION ADULT - PERTINENT HX OF CURRENT PROBLEM, REHAB EVAL
62 year old F transfered from [facility], pt with hx of pancreatic cancer, s/p 6 months of neoadjuvant FOLFIRINOX chemotherapy and pre-operative RT (completed about 2 months prior) followed by Whipple resection with Dr. Tolentino on 9/21/23. Pt with low appetite, chronic diarrhea, found to have small retropancreatic collection 62 year old F transferred from [facility] , pt with hx of pancreatic cancer, s/p 6 months of neoadjuvant FOLFIRINOX chemotherapy and pre-operative RT (completed about 2 months prior) followed by Whipple resection with Dr. Tolentino on 9/21/23. Pt with low appetite ,poor PO intake , chronic diarrhea, and recently taking to bed ,per family not doing well since her surgery past 1 month ,found to have small retropancreatic fluid collection that needs to be drained

## 2023-10-20 NOTE — H&P ADULT - NSHPPHYSICALEXAM_GEN_ALL_CORE
Physical Exam:  General: AAOx3, Well developed, NAD  Chest: Normal respiratory effort  Heart: RRR  Abdomen: Soft, NTND, no masses, midline incision healing well  Neuro/Psych: No localized deficits. Normal spech, normal tone  Skin: Normal, no rashes, no lesions noted.   Extremities: Warm, well perfused, no edema, Pulses intact

## 2023-10-20 NOTE — PHYSICAL THERAPY INITIAL EVALUATION ADULT - GENERAL OBSERVATIONS, REHAB EVAL
Pt seen for 30min PT bedside Eval. Pt rec'd semi supine in bed in in SICU, NAD, declining OOB repots feeling very fatigued not sleeping much in the last 3 days. Pt willing to participate in bedside eval. History taken pt states previously indep without AD, pt ROM WFL, and strength grossly 3+/5 throughout. Pt left semi supine in bed in NAD, all needs met, +bed alarm, RN aware.

## 2023-10-20 NOTE — CONSULT NOTE ADULT - SUBJECTIVE AND OBJECTIVE BOX
Patient is a 62y old  Female who presents with a chief complaint of   PAST MEDICAL & SURGICAL HISTORY:  Malignant neoplasm of pancreas      Seasonal allergies      Asthma      S/p left hip fracture      History of infusaport central venous catheter insertion      S/P ERCP        DODIE QUINONES   62y    Female    BRIEF HOSPITAL COURSE:    Review of Systems:                       All other ROS are negative.    Allergies    No Known Allergies    Intolerances          ICU Vital Signs Last 24 Hrs  T(C): --  T(F): --  HR: --  BP: --  BP(mean): --  ABP: --  ABP(mean): --  RR: --  SpO2: --      Physical Examination:    General:     HEENT:     PULM:     CVS:     ABD:     EXT:     SKIN:     Neuro:          LABS:                CAPILLARY BLOOD GLUCOSE            CULTURES:      Medications:  MEDICATIONS  (STANDING):  dextrose 5% + sodium chloride 0.9%. 1000 milliLiter(s) (75 mL/Hr) IV Continuous <Continuous>  piperacillin/tazobactam IVPB. 3.375 Gram(s) IV Intermittent once  piperacillin/tazobactam IVPB.- 3.375 Gram(s) IV Intermittent once  piperacillin/tazobactam IVPB.- 3.375 Gram(s) IV Intermittent once  piperacillin/tazobactam IVPB.. 3.375 Gram(s) IV Intermittent every 8 hours    MEDICATIONS  (PRN):  acetaminophen   IVPB .. 650 milliGRAM(s) IV Intermittent every 6 hours PRN Temp greater or equal to 38C (100.4F), Mild Pain (1 - 3)  ketorolac   Injectable 15 milliGRAM(s) IV Push once PRN Moderate Pain (4 - 6)  morphine  - Injectable 2 milliGRAM(s) IV Push every 4 hours PRN Severe Pain (7 - 10)  ondansetron Injectable 4 milliGRAM(s) IV Push every 6 hours PRN Nausea  senna 2 Tablet(s) Oral at bedtime PRN Constipation          RADIOLOGY/IMAGING/ECHO    CT from [facility] shows collectio of fluid behind the pancreas     Assessment/Plan:    62F hx HTN  pancreatic ca, S/P whipple in September     Sent here for management of a  retropancreatic fluid collection    Her WBC is elevated ([facility] labs)  No labs drawn here yet   concern for an infected collection.      IR/GI to see regarding the management of the collection.    Hold antihypertensives      Patient is a 62y old  Female who presents with a chief complaint of   PAST MEDICAL & SURGICAL HISTORY:  Malignant neoplasm of pancreas      Seasonal allergies      Asthma      S/p left hip fracture      History of infusaport central venous catheter insertion      S/P ERCP        DODIE QUINONES   62y    Female    BRIEF HOSPITAL COURSE:    Review of Systems:                       All other ROS are negative.    Allergies    No Known Allergies    Intolerances          ICU Vital Signs Last 24 Hrs  T(C): --  T(F): --  HR: --  BP: --  BP(mean): --  ABP: --  ABP(mean): --  RR: --  SpO2: --      Physical Examination:    General:     HEENT:     PULM:     CVS:     ABD:     EXT:     SKIN:     Neuro:          LABS:                CAPILLARY BLOOD GLUCOSE            CULTURES:      Medications:  MEDICATIONS  (STANDING):  dextrose 5% + sodium chloride 0.9%. 1000 milliLiter(s) (75 mL/Hr) IV Continuous <Continuous>  piperacillin/tazobactam IVPB. 3.375 Gram(s) IV Intermittent once  piperacillin/tazobactam IVPB.- 3.375 Gram(s) IV Intermittent once  piperacillin/tazobactam IVPB.- 3.375 Gram(s) IV Intermittent once  piperacillin/tazobactam IVPB.. 3.375 Gram(s) IV Intermittent every 8 hours    MEDICATIONS  (PRN):  acetaminophen   IVPB .. 650 milliGRAM(s) IV Intermittent every 6 hours PRN Temp greater or equal to 38C (100.4F), Mild Pain (1 - 3)  ketorolac   Injectable 15 milliGRAM(s) IV Push once PRN Moderate Pain (4 - 6)  morphine  - Injectable 2 milliGRAM(s) IV Push every 4 hours PRN Severe Pain (7 - 10)  ondansetron Injectable 4 milliGRAM(s) IV Push every 6 hours PRN Nausea  senna 2 Tablet(s) Oral at bedtime PRN Constipation          RADIOLOGY/IMAGING/ECHO    CT from [facility] shows collection of fluid behind the pancreas and sub hepatic      Assessment/Plan:    62F hx HTN  COPD  pancreatic ca,  s/p RT/chemo  (Folfirinox)  Post op  whipple sx 9/21     [place] to [facility] [place] loose stools.  Noted fever and leukocytosis.   CT of the abd/pelvis with  a  retropancreatic/ subhepatic fluid collections      Sent here for management.   IR/GI to see regarding the management of the collections.    WBC is elevated ([facility] labs)  No labs drawn here yet  Concern for an infected collection.      Hold antihypertensives      Patient is a 62y old  Female who presents with a chief complaint of   PAST MEDICAL & SURGICAL HISTORY:  Malignant neoplasm of pancreas      Seasonal allergies      Asthma      S/p left hip fracture      History of infusaport central venous catheter insertion      S/P ERCP        DODIE QUINONES   62y    Female    BRIEF HOSPITAL COURSE:    Review of Systems:  + Rigors                      All other ROS are negative.    Allergies    No Known Allergies    Intolerances          ICU Vital Signs Last 24 Hrs  T(C): --  T(F): --  HR: --  BP: --  BP(mean): --  ABP: --  ABP(mean): --  RR: --  SpO2: --      Physical Examination:    General:  NAD    HEENT: no JVD     PULM: bilateral BS     CVS: s1 s2 reg    ABD: soft NT  well dafne abd sx scar     EXT: no edema     SKIN: warm    Neuro: alert           LABS:                CAPILLARY BLOOD GLUCOSE            CULTURES:      Medications:  MEDICATIONS  (STANDING):  dextrose 5% + sodium chloride 0.9%. 1000 milliLiter(s) (75 mL/Hr) IV Continuous <Continuous>  piperacillin/tazobactam IVPB. 3.375 Gram(s) IV Intermittent once  piperacillin/tazobactam IVPB.- 3.375 Gram(s) IV Intermittent once  piperacillin/tazobactam IVPB.- 3.375 Gram(s) IV Intermittent once  piperacillin/tazobactam IVPB.. 3.375 Gram(s) IV Intermittent every 8 hours    MEDICATIONS  (PRN):  acetaminophen   IVPB .. 650 milliGRAM(s) IV Intermittent every 6 hours PRN Temp greater or equal to 38C (100.4F), Mild Pain (1 - 3)  ketorolac   Injectable 15 milliGRAM(s) IV Push once PRN Moderate Pain (4 - 6)  morphine  - Injectable 2 milliGRAM(s) IV Push every 4 hours PRN Severe Pain (7 - 10)  ondansetron Injectable 4 milliGRAM(s) IV Push every 6 hours PRN Nausea  senna 2 Tablet(s) Oral at bedtime PRN Constipation          RADIOLOGY/IMAGING/ECHO    CT from  shows collection of fluid behind the pancreas and sub hepatic      Assessment/Plan:    62F hx HTN  COPD  pancreatic ca,  s/p RT/chemo  (Folfirinox)  Post op  whipple sx 9/21     Admit to [facility] [place] with [place] loose stools.  Noted fever and leukocytosis.   CT of the abd/pelvis with  a  retropancreatic/ subhepatic fluid collections    Has been having rigors for a week.      Sent here for management.   IR/GI to see regarding the management of the collections.    WBC is elevated ([facility] labs)  No labs drawn here yet  Concern for an infected collection.      She is HD stable at present     Need to assess labs drawn here     Hold antihypertensives with parameters      Patient is a 62y old  Female who presents with a chief complaint of   PAST MEDICAL & SURGICAL HISTORY:  Malignant neoplasm of pancreas      Seasonal allergies      Asthma      S/p left hip fracture      History of infusaport central venous catheter insertion      S/P ERCP        DODIE QUINONES   62y    Female    BRIEF HOSPITAL COURSE:    Review of Systems:  + Rigors                      All other ROS are negative.    Allergies    No Known Allergies    Intolerances          ICU Vital Signs Last 24 Hrs  T(C): --  T(F): --  HR: --  BP: --  BP(mean): --  ABP: --  ABP(mean): --  RR: --  SpO2: --      Physical Examination:    General:  NAD    HEENT: no JVD     PULM: bilateral BS     CVS: s1 s2 reg    ABD: soft NT  well dafne abd sx scar     EXT: no edema     SKIN: warm    Neuro: alert           LABS:                CAPILLARY BLOOD GLUCOSE            CULTURES:      Medications:  MEDICATIONS  (STANDING):  dextrose 5% + sodium chloride 0.9%. 1000 milliLiter(s) (75 mL/Hr) IV Continuous <Continuous>  piperacillin/tazobactam IVPB. 3.375 Gram(s) IV Intermittent once  piperacillin/tazobactam IVPB.- 3.375 Gram(s) IV Intermittent once  piperacillin/tazobactam IVPB.- 3.375 Gram(s) IV Intermittent once  piperacillin/tazobactam IVPB.. 3.375 Gram(s) IV Intermittent every 8 hours    MEDICATIONS  (PRN):  acetaminophen   IVPB .. 650 milliGRAM(s) IV Intermittent every 6 hours PRN Temp greater or equal to 38C (100.4F), Mild Pain (1 - 3)  ketorolac   Injectable 15 milliGRAM(s) IV Push once PRN Moderate Pain (4 - 6)  morphine  - Injectable 2 milliGRAM(s) IV Push every 4 hours PRN Severe Pain (7 - 10)  ondansetron Injectable 4 milliGRAM(s) IV Push every 6 hours PRN Nausea  senna 2 Tablet(s) Oral at bedtime PRN Constipation          RADIOLOGY/IMAGING/ECHO    CT from  shows collection of fluid behind the pancreas and sub hepatic      Assessment/Plan:    62F hx HTN  COPD  pancreatic ca,  s/p RT/chemo  (Folfirinox)  Post op  whipple sx 9/21     [place] [facility] [place] loose st[place].  Noted fever and leukocytosis.   CT of the abd/pelvis with  a  retropancreatic/ subhepatic fluid collections    Has been having rigors for a week.      Sent here for management.   IR/GI to see regarding the management of the collections.    WBC is elevated ([facility] labs)  No labs drawn here yet  Concern for an infected collection.      She is HD stable at present     Need to assess labs drawn here     Hold antihypertensives with parameters     ABX ID consult   ESBL E coli in abd fluid Sept    Needs a carbapenem.    Teeth chattering rigors for a week.  She has an [id number] that will need  to be considered if she is bacteremic,

## 2023-10-20 NOTE — CONSULT NOTE ADULT - ASSESSMENT
62 year old female with pancreatic cancer s/p whipple with Dr. Tolentino 9/21/23 with     Imp: sepsis 2/2 to post-whipple retroperitoneal collection    Rec:  ::Continue IV meropenum  ::NPO per surgery  ::Serial CT's per surgery

## 2023-10-20 NOTE — PATIENT PROFILE ADULT - FUNCTIONAL ASSESSMENT - BASIC MOBILITY SCORE.
[24

## 2023-10-20 NOTE — H&P ADULT - NSICDXPASTSURGICALHX_GEN_ALL_CORE_FT
PAST SURGICAL HISTORY:  H/O Whipple procedure     History of infusaport central venous catheter insertion     S/P ERCP     S/p left hip fracture

## 2023-10-20 NOTE — CONSULT NOTE ADULT - NS ATTEND AMEND GEN_ALL_CORE FT
Patient evaluated, outpatient and [facility] records reviewed.  63 y/o female with locally advanced pancreatic cancer diagnosed in Sept 2022. S/p neoadjuvant chemotherapy with FOLFIRINOX x 6 months ( completed 4/25/23 ) and brief course of RT to pancreatic tumor bed. S/p Whipple resection 9/21/23 - for ypT1c, ypN1 disease . Admitted with fevers, peripancreatic bed fluid collection, blood cultures positive for GNR.  Acute thrombocytopenia ( plts were 433 on 10/11/23) and worsening anemia likely 2/2 acute infection / E coli bacteremia/ sepsis.  Clinically- no suspicion for HIT.  management per medicine /ID/ surgery.  Support with transfusions as necessary. Transfuse plts to maintain > 15 K. Will need plts prior to invasive procedure ( IR drainage of abdominal fluid collection) Patient evaluated, outpatient and [facility] records reviewed.  63 y/o female with locally advanced pancreatic cancer diagnosed in Sept 2022. S/p neoadjuvant chemotherapy with FOLFIRINOX x 6 months ( completed 4/25/23 ) and brief course of RT to pancreatic tumor bed. S/p Whipple resection 9/21/23 - for ypT1c, ypN1 disease . Admitted with fevers, peripancreatic bed fluid collection, blood cultures positive for GNR  ( urinary versus intraabdominal source).  Acute thrombocytopenia ( plts were 433 on 10/11/23) and worsening anemia likely 2/2 acute infection / Gram negative /  E coli bacteremia/ sepsis.  Clinically- no suspicion for HIT.  management per medicine /ID/ surgery.  Support with transfusions as necessary. Transfuse plts to maintain > 15 K. Will need plts prior to invasive procedure ( IR drainage of abdominal fluid collection)

## 2023-10-20 NOTE — H&P ADULT - NSHPLABSRESULTS_GEN_ALL_CORE
Current Inpatient Medications:  acetaminophen   IVPB .. 650 milliGRAM(s) IV Intermittent every 6 hours PRN  albuterol    90 MICROgram(s) HFA Inhaler 2 Puff(s) Inhalation every 6 hours PRN  amLODIPine   Tablet 10 milliGRAM(s) Oral daily  dextrose 5% + sodium chloride 0.9%. 1000 milliLiter(s) (75 mL/Hr) IV Continuous <Continuous>  gabapentin 100 milliGRAM(s) Oral three times a day  ketorolac   Injectable 15 milliGRAM(s) IV Push once PRN  morphine  - Injectable 2 milliGRAM(s) IV Push every 4 hours PRN  ondansetron Injectable 4 milliGRAM(s) IV Push every 6 hours PRN  pancrelipase  (CREON 12,000 Lipase Units) 2 Capsule(s) Oral three times a day with meals  pantoprazole  Injectable 40 milliGRAM(s) IV Push daily  piperacillin/tazobactam IVPB. 3.375 Gram(s) IV Intermittent once  piperacillin/tazobactam IVPB.- 3.375 Gram(s) IV Intermittent once  piperacillin/tazobactam IVPB.- 3.375 Gram(s) IV Intermittent once  piperacillin/tazobactam IVPB.. 3.375 Gram(s) IV Intermittent every 8 hours  senna 2 Tablet(s) Oral at bedtime PRN    FINDINGS:  CHEST:  LUNGS AND LARGE AIRWAYS: Patent central airways. No pulmonary nodule or lung consolidation. Emphysema.  PLEURA: No pleural effusion.  VESSELS: Within normal limits.  HEART: Heart size is normal. No pericardial effusion.  MEDIASTINUM AND NITESH: No lymphadenopathy.  CHEST WALL AND LOWER NECK: Within normal limits.    ABDOMEN AND PELVIS:  LIVER: Within normal limits.  BILE DUCTS: Pneumobilia. No ductal dilatation.  GALLBLADDER: Cholecystectomy.  SPLEEN: Within normal limits.  PANCREAS: Interval Whipple procedure with unremarkable pancreatic remnant, pancreatic duct stent in place with no ductal dilatation. Fluid collection posterior to the pancreas and duodenum and measures 4.0 x 5.6 x 1.7 cm (TR, CC, AP).  ADRENALS: Within normal limits.  KIDNEYS/URETERS: Within normal limits.    BLADDER: Within normal limits.  REPRODUCTIVE ORGANS: Uterus and adnexa within normal limits.    BOWEL: No bowel obstruction. Appendix is normal. Status post gastrojejunostomy which appears intact.  PERITONEUM: Small amount of fluid in the subhepatic space anterior to the pancreatic remnant.  VESSELS: Patent portal, superior mesenteric and hepatic veins. Note that the SMV is narrowed by the retropancreatic fluid collection.  RETROPERITONEUM/LYMPH NODES: No lymphadenopathy.  ABDOMINAL WALL: Within normal limits.  BONES: Left hip open reduction internal fixation with 3 screws in place.    IMPRESSION:  No pulmonary embolism or other acute chest pathology.    Status post Whipple procedure with small retropancreatic fluid collection and small amount of fluid in the subhepatic space.

## 2023-10-20 NOTE — CONSULT NOTE ADULT - SUBJECTIVE AND OBJECTIVE BOX
HPI:    61 y/o F with a PMHx of pancreatic cancer, s/p 6 months of neoadjuvant FOLFIRINOX chemotherapy and pre-operative RT completed 04/2023? followed by Whipple resection with SurgOnc Dr Tolentino on 09/21/23 now admitted to , transferred from [place] for management of peripancreatic fluid collection and acute thrombocytopenia. According to family, she has not been well for 2 weeks, in bed, not eating well, had a lot of diarrhea post op. The patient admitted to some weight loss in the last week, but is not able to quantify how much. CT AP showed emphysema without infiltrates & small retro pancreatic fluid collection and small fluid collection around subhepatic space. Fever was noted at [place].      10/20/2023:      PAST MEDICAL & SURGICAL HISTORY:    Malignant neoplasm of pancreas    Seasonal allergies    Asthma    S/p left hip fracture    History of infusaport central venous catheter insertion    S/P ERCP    H/O Whipple procedure        MEDICATIONS  (STANDING):    amLODIPine   Tablet 10 milliGRAM(s) Oral daily  dextrose 5% + sodium chloride 0.9% 1000 milliLiter(s) (75 mL/Hr) IV Continuous <Continuous>  gabapentin 100 milliGRAM(s) Oral three times a day  influenza   Vaccine 0.5 milliLiter(s) IntraMuscular once  meropenem  IVPB 1000 milliGRAM(s) IV Intermittent every 8 hours  pancrelipase  (CREON 12,000 Lipase Units) 2 Capsule(s) Oral three times a day with meals  pantoprazole  Injectable 40 milliGRAM(s) IV Push daily  potassium chloride    Tablet ER 40 milliEquivalent(s) Oral daily  potassium chloride  10 mEq/100 mL IVPB 10 milliEquivalent(s) IV Intermittent every 1 hour      MEDICATIONS  (PRN):    acetaminophen   IVPB .. 650 milliGRAM(s) IV Intermittent every 6 hours PRN Temp greater or equal to 38C (100.4F), Mild Pain (1 - 3)  albuterol    90 MICROgram(s) HFA Inhaler 2 Puff(s) Inhalation every 6 hours PRN for bronchospasm  morphine  - Injectable 2 milliGRAM(s) IV Push every 4 hours PRN Severe Pain (7 - 10)  ondansetron Injectable 4 milliGRAM(s) IV Push every 6 hours PRN Nausea  senna 2 Tablet(s) Oral at bedtime PRN Constipation      ALLERGIES:    No Known Allergies      FAMILY HISTORY:    Nothing noted      REVIEW OF SYSTEMS:    Constitutional, Eyes, ENT, Cardiovascular, Respiratory, Gastrointestinal, Genitourinary, Musculoskeletal, Integumentary, Neurological, Psychiatric, Endocrine, Heme/Lymph and Allergic/Immunologic review of systems are otherwise negative except as noted in HPI.       VITALS:    T(C): 37.1 (20 Oct 2023 09:20), Max: 37.1 (20 Oct 2023 09:20)  T(F): 98.7 (20 Oct 2023 09:20), Max: 98.7 (20 Oct 2023 09:20)  HR: 67 (20 Oct 2023 05:12) (67 - 67)  BP: 115/84 (20 Oct 2023 05:12) (115/84 - 115/84)  BP(mean): 94 (20 Oct 2023 05:12) (94 - 94)  RR: --  SpO2: 97% (20 Oct 2023 05:12) (97% - 97%)      PHYSICAL:    Constitutional:   Eyes: no conjunctival infection, anicteric.   ENT: pharynx is unremarkable  Neck: supple without JVD   Pulmonary: clear to auscultation bilaterally   Cardiac: RRR,   Vascular: no calf tenderness, venous stasis changes, varices  Abdomen: normoactive bowel sounds, soft and nontender, no hepatosplenomegaly or masses appreciated  Lymphatic: no peripheral adenopathy appreciated  Musculoskeletal: full range of motion and no deformities appreciated   Skin: normal appearance, no rash/erythema   Neurology:       LABS:    CBC Full  -  ( 20 Oct 2023 08:27 )  WBC Count : 7.47 K/uL  RBC Count : 2.44 M/uL  Hemoglobin : 8.1 g/dL  Hematocrit : 23.5 %  Platelet Count - Automated : 14 K/uL  Mean Cell Volume : 96.3 fl  Mean Cell Hemoglobin : 33.2 pg  Mean Cell Hemoglobin Concentration : 34.5 gm/dL  Auto Neutrophil # : x  Auto Lymphocyte # : x  Auto Monocyte # : x  Auto Eosinophil # : x  Auto Basophil # : x  Auto Neutrophil % : x  Auto Lymphocyte % : x  Auto Monocyte % : x  Auto Eosinophil % : x  Auto Basophil % : x    10-20    136  |  104  |  11  ----------------------------<  133<H>  2.6<LL>   |  24  |  0.37<L>    Ca    6.9<L>      20 Oct 2023 08:27  Phos  3.7     10-20  Mg     2.0     10-20    TPro  4.2<L>  /  Alb  1.4<L>  /  TBili  0.9  /  DBili  x   /  AST  26  /  ALT  16  /  AlkPhos  138<H>  10-20    PT/INR - ( 20 Oct 2023 05:26 )   PT: 12.4 sec;   INR: 1.10 ratio         PTT - ( 20 Oct 2023 05:26 )  PTT:29.3 sec  Urinalysis Basic - ( 20 Oct 2023 08:27 )    Color: x / Appearance: x / SG: x / pH: x  Gluc: 133 mg/dL / Ketone: x  / Bili: x / Urobili: x   Blood: x / Protein: x / Nitrite: x   Leuk Esterase: x / RBC: x / WBC x   Sq Epi: x / Non Sq Epi: x / Bacteria: x        RADIOLOGY & ADDITIONAL STUDIES:       HPI:    63 y/o F with a PMHx of pancreatic cancer, s/p 6 months of neoadjuvant FOLFIRINOX chemotherapy and pre-operative RT completed 04/2023? followed by Whipple resection with SurgOnc Dr Tolentino on 09/21/23 now admitted to , transferred from [facility] for management of peripancreatic fluid collection and acute thrombocytopenia. According to family, she has not been well for 2 weeks, in bed, not eating well, had a lot of diarrhea post op. The patient admitted to some weight loss in the last week, but is not able to quantify how much. CT AP showed emphysema without infiltrates & small retro pancreatic fluid collection and small fluid collection around subhepatic space. Fever was noted at [facility].      10/20/2023: Seen at bedside, no acute distress, understanding of current medical situation       PAST MEDICAL & SURGICAL HISTORY:    Malignant neoplasm of pancreas    Seasonal allergies    Asthma    S/p left hip fracture    History of infusaport central venous catheter insertion    S/P ERCP    H/O Whipple procedure        MEDICATIONS  (STANDING):    amLODIPine   Tablet 10 milliGRAM(s) Oral daily  dextrose 5% + sodium chloride 0.9% 1000 milliLiter(s) (75 mL/Hr) IV Continuous <Continuous>  gabapentin 100 milliGRAM(s) Oral three times a day  influenza   Vaccine 0.5 milliLiter(s) IntraMuscular once  meropenem  IVPB 1000 milliGRAM(s) IV Intermittent every 8 hours  pancrelipase  (CREON 12,000 Lipase Units) 2 Capsule(s) Oral three times a day with meals  pantoprazole  Injectable 40 milliGRAM(s) IV Push daily  potassium chloride    Tablet ER 40 milliEquivalent(s) Oral daily  potassium chloride  10 mEq/100 mL IVPB 10 milliEquivalent(s) IV Intermittent every 1 hour      MEDICATIONS  (PRN):    acetaminophen   IVPB .. 650 milliGRAM(s) IV Intermittent every 6 hours PRN Temp greater or equal to 38C (100.4F), Mild Pain (1 - 3)  albuterol    90 MICROgram(s) HFA Inhaler 2 Puff(s) Inhalation every 6 hours PRN for bronchospasm  morphine  - Injectable 2 milliGRAM(s) IV Push every 4 hours PRN Severe Pain (7 - 10)  ondansetron Injectable 4 milliGRAM(s) IV Push every 6 hours PRN Nausea  senna 2 Tablet(s) Oral at bedtime PRN Constipation      ALLERGIES:    No Known Allergies      FAMILY HISTORY:    Nothing noted      REVIEW OF SYSTEMS:    Constitutional, Eyes, ENT, Cardiovascular, Respiratory, Gastrointestinal, Genitourinary, Musculoskeletal, Integumentary, Neurological, Psychiatric, Endocrine, Heme/Lymph and Allergic/Immunologic review of systems are otherwise negative except as noted in HPI.       VITALS:    T(C): 37.1 (20 Oct 2023 09:20), Max: 37.1 (20 Oct 2023 09:20)  T(F): 98.7 (20 Oct 2023 09:20), Max: 98.7 (20 Oct 2023 09:20)  HR: 67 (20 Oct 2023 05:12) (67 - 67)  BP: 115/84 (20 Oct 2023 05:12) (115/84 - 115/84)  BP(mean): 94 (20 Oct 2023 05:12) (94 - 94)  RR: --  SpO2: 97% (20 Oct 2023 05:12) (97% - 97%)      PHYSICAL:    Constitutional: no acute distress  Eyes: no conjunctival infection, anicteric.   ENT: pharynx is unremarkable  Neck: supple without JVD   Pulmonary: clear to auscultation bilaterally   Cardiac: RRR,   Vascular: no calf tenderness, venous stasis changes, varices  Abdomen: normoactive bowel sounds, soft and nontender, no hepatosplenomegaly or masses appreciated  Lymphatic: no peripheral adenopathy appreciated  Musculoskeletal: full range of motion and no deformities appreciated   Skin: normal appearance, no rash/erythema   Neurology: awake, alert, oriented ; no focal deficits       LABS:    CBC Full  -  ( 20 Oct 2023 08:27 )  WBC Count : 7.47 K/uL  RBC Count : 2.44 M/uL  Hemoglobin : 8.1 g/dL  Hematocrit : 23.5 %  Platelet Count - Automated : 14 K/uL  Mean Cell Volume : 96.3 fl  Mean Cell Hemoglobin : 33.2 pg  Mean Cell Hemoglobin Concentration : 34.5 gm/dL  Auto Neutrophil # : x  Auto Lymphocyte # : x  Auto Monocyte # : x  Auto Eosinophil # : x  Auto Basophil # : x  Auto Neutrophil % : x  Auto Lymphocyte % : x  Auto Monocyte % : x  Auto Eosinophil % : x  Auto Basophil % : x    10-20    136  |  104  |  11  ----------------------------<  133<H>  2.6<LL>   |  24  |  0.37<L>    Ca    6.9<L>      20 Oct 2023 08:27  Phos  3.7     10-20  Mg     2.0     10-20    TPro  4.2<L>  /  Alb  1.4<L>  /  TBili  0.9  /  DBili  x   /  AST  26  /  ALT  16  /  AlkPhos  138<H>  10-20    PT/INR - ( 20 Oct 2023 05:26 )   PT: 12.4 sec;   INR: 1.10 ratio         PTT - ( 20 Oct 2023 05:26 )  PTT:29.3 sec  Urinalysis Basic - ( 20 Oct 2023 08:27 )    Color: x / Appearance: x / SG: x / pH: x  Gluc: 133 mg/dL / Ketone: x  / Bili: x / Urobili: x   Blood: x / Protein: x / Nitrite: x   Leuk Esterase: x / RBC: x / WBC x   Sq Epi: x / Non Sq Epi: x / Bacteria: x        RADIOLOGY & ADDITIONAL STUDIES:

## 2023-10-20 NOTE — CONSULT NOTE ADULT - SUBJECTIVE AND OBJECTIVE BOX
Patient is a 62y old  Female who presents with a chief complaint of [facility] Transfer for Peripancreatic Fluid Collection S/p Whipple 09/2023    HPI:  This is a 62 year old female with significant past medical history of pancreatic cancer s/p 6 months of neoadjuvant FOLFIRINOX chemotherapy and pre-operative RT (completed about 2 months prior) followed by Whipple resection with Dr. Tolentino on 9/21/23. Per patient reported frequent loose stools with minimal appetite and associated weight loss which prompted her daughter to call 911 and admitted to [facility]. CT scan with RP collection while there prompting transfer to  for further management. Thrombocytopenic. Denies discomfort, abdominal pain, nausea, vomiting, or loose stools today. NPO per surgery.     PAST MEDICAL & SURGICAL HISTORY:  Malignant neoplasm of pancreas      Seasonal allergies      Asthma      S/p left hip fracture      History of infusaport central venous catheter insertion      S/P ERCP      H/O Whipple procedure    MEDICATIONS  (STANDING):  amLODIPine   Tablet 10 milliGRAM(s) Oral daily  budesonide 160 MICROgram(s)/formoterol 4.5 MICROgram(s) Inhaler 2 Puff(s) Inhalation two times a day  dextrose 5% + sodium chloride 0.9% 1000 milliLiter(s) (75 mL/Hr) IV Continuous <Continuous>  gabapentin 100 milliGRAM(s) Oral three times a day  influenza   Vaccine 0.5 milliLiter(s) IntraMuscular once  meropenem Injectable 1000 milliGRAM(s) IV Push every 8 hours  pantoprazole  Injectable 40 milliGRAM(s) IV Push daily  tiotropium 2.5 MICROgram(s) Inhaler 2 Puff(s) Inhalation daily    MEDICATIONS  (PRN):  acetaminophen   IVPB .. 650 milliGRAM(s) IV Intermittent every 6 hours PRN Temp greater or equal to 38C (100.4F), Mild Pain (1 - 3)  albuterol    90 MICROgram(s) HFA Inhaler 2 Puff(s) Inhalation every 6 hours PRN for bronchospasm  morphine  - Injectable 2 milliGRAM(s) IV Push every 4 hours PRN Severe Pain (7 - 10)  ondansetron Injectable 4 milliGRAM(s) IV Push every 6 hours PRN Nausea  senna 2 Tablet(s) Oral at bedtime PRN Constipation      Allergies    No Known Allergies    Intolerances        SOCIAL HISTORY:    FAMILY HISTORY:      REVIEW OF SYSTEMS:    CONSTITUTIONAL: No weakness, fevers or chills  EYES/ENT: No visual changes;  No vertigo or throat pain   NECK: No pain or stiffness  RESPIRATORY: No cough, wheezing, hemoptysis; No shortness of breath  CARDIOVASCULAR: No chest pain or palpitations  GASTROINTESTINAL: See HPI  GENITOURINARY: No dysuria, frequency or hematuria  NEUROLOGICAL: No numbness or weakness  SKIN: No itching, burning, rashes, or lesions   PSYCH: Normal mood and affect  All other review of systems is negative unless indicated above.    Vital Signs Last 24 Hrs  T(C): 37.1 (20 Oct 2023 14:15), Max: 37.2 (20 Oct 2023 13:48)  T(F): 98.7 (20 Oct 2023 14:15), Max: 98.9 (20 Oct 2023 13:48)  HR: 88 (20 Oct 2023 14:15) (62 - 88)  BP: 120/68 (20 Oct 2023 14:15) (115/64 - 140/67)  BP(mean): 83 (20 Oct 2023 14:15) (80 - 94)  RR: 20 (20 Oct 2023 14:15) (20 - 26)  SpO2: 100% (20 Oct 2023 14:15) (91% - 100%)    PHYSICAL EXAM:    Constitutional: No acute distress, well-developed, non-toxic appearing  HEENT: masked, good phonation, not icteric  Neck: supple, no lymphadenopathy  Respiratory: clear to ascultation bilaterally, no wheezing  Cardiovascular: S1 and S2, regular rate and rhythm, no murmurs rubs or gallops  Gastrointestinal: soft, non-tender, non-distended, +bowel sounds, no rebound or guarding, healing midline abdominal wound, CDI, no drains  Extremities: No peripheral edema, no cyanosis or clubbing  Vascular: 2+ peripheral pulses, no venous stasis  Neurological: A/O x 3, no focal deficits, no asterixis  Psychiatric: Normal mood, normal affect  Skin: No rashes, not jaundiced    LABS:                        8.0    7.83  )-----------( 15       ( 20 Oct 2023 10:54 )             24.1     10-20    137  |  106  |  9   ----------------------------<  122<H>  4.7   |  27  |  0.45<L>    Ca    6.8<L>      20 Oct 2023 10:54  Phos  2.9     10-20  Mg     1.6     10-20    TPro  4.6<L>  /  Alb  1.5<L>  /  TBili  0.9  /  DBili  x   /  AST  25  /  ALT  20  /  AlkPhos  151<H>  10-20    PT/INR - ( 20 Oct 2023 05:26 )   PT: 12.4 sec;   INR: 1.10 ratio         PTT - ( 20 Oct 2023 05:26 )  PTT:29.3 sec  LIVER FUNCTIONS - ( 20 Oct 2023 10:54 )  Alb: 1.5 g/dL / Pro: 4.6 gm/dL / ALK PHOS: 151 U/L / ALT: 20 U/L / AST: 25 U/L / GGT: x             RADIOLOGY & ADDITIONAL STUDIES: PACS CT reviewed Patient is a 62y old  Female who presents with a chief complaint of [facility] Transfer for Peripancreatic Fluid Collection S/p Whipple 09/2023    62 year old woman with pancreatic cancer s/p neoadjuvant therapy and whipple a few weeks ago, now admitted with RP fluid collection.     Patient was recovering at home after her whipple, and for the last few days prior to admission her daughter noticed she had very poor appetite and was not eating. She was a bit lethargic. No other localizing symptoms like abdomianl pain, fevers/chills. No nausea or vomiting. No overt signs of GI bleeding like hematemesis, melena, hematochezia. Her daughter was concerned so brought her to [facility], where imaging diagnosed an RP fluid collection. She was transferred to [facility] for continuity of care. Currently the patient stable in the SICU, on antibiotics. Denies any abdominal pain. Worst lab value is severe Thrombocytopenia. NPO per surgery.     PAST MEDICAL & SURGICAL HISTORY:  Malignant neoplasm of pancreas      Seasonal allergies      Asthma      S/p left hip fracture      History of infusaport central venous catheter insertion      S/P ERCP      H/O Whipple procedure    MEDICATIONS  (STANDING):  amLODIPine   Tablet 10 milliGRAM(s) Oral daily  budesonide 160 MICROgram(s)/formoterol 4.5 MICROgram(s) Inhaler 2 Puff(s) Inhalation two times a day  dextrose 5% + sodium chloride 0.9% 1000 milliLiter(s) (75 mL/Hr) IV Continuous <Continuous>  gabapentin 100 milliGRAM(s) Oral three times a day  influenza   Vaccine 0.5 milliLiter(s) IntraMuscular once  meropenem Injectable 1000 milliGRAM(s) IV Push every 8 hours  pantoprazole  Injectable 40 milliGRAM(s) IV Push daily  tiotropium 2.5 MICROgram(s) Inhaler 2 Puff(s) Inhalation daily    MEDICATIONS  (PRN):  acetaminophen   IVPB .. 650 milliGRAM(s) IV Intermittent every 6 hours PRN Temp greater or equal to 38C (100.4F), Mild Pain (1 - 3)  albuterol    90 MICROgram(s) HFA Inhaler 2 Puff(s) Inhalation every 6 hours PRN for bronchospasm  morphine  - Injectable 2 milliGRAM(s) IV Push every 4 hours PRN Severe Pain (7 - 10)  ondansetron Injectable 4 milliGRAM(s) IV Push every 6 hours PRN Nausea  senna 2 Tablet(s) Oral at bedtime PRN Constipation      Allergies    No Known Allergies    Intolerances    SOCIAL HISTORY:  no smoking, drinking or drugs    FAMILY HISTORY:  no colon or stomach cancer      REVIEW OF SYSTEMS:    CONSTITUTIONAL: + weakness,  no  fevers or chills  EYES/ENT: No visual changes;  No vertigo or throat pain   NECK: No pain or stiffness  RESPIRATORY: No cough, wheezing, hemoptysis; No shortness of breath  CARDIOVASCULAR: No chest pain or palpitations  GASTROINTESTINAL: See HPI  GENITOURINARY: No dysuria, frequency or hematuria  NEUROLOGICAL: No numbness or weakness  SKIN: No itching, burning, rashes, or lesions   PSYCH: Normal mood and affect  All other review of systems is negative unless indicated above.    Vital Signs Last 24 Hrs  T(C): 37.1 (20 Oct 2023 14:15), Max: 37.2 (20 Oct 2023 13:48)  T(F): 98.7 (20 Oct 2023 14:15), Max: 98.9 (20 Oct 2023 13:48)  HR: 88 (20 Oct 2023 14:15) (62 - 88)  BP: 120/68 (20 Oct 2023 14:15) (115/64 - 140/67)  BP(mean): 83 (20 Oct 2023 14:15) (80 - 94)  RR: 20 (20 Oct 2023 14:15) (20 - 26)  SpO2: 100% (20 Oct 2023 14:15) (91% - 100%)    PHYSICAL EXAM:    Constitutional: No acute distress, well-developed, non-toxic appearing  HEENT: unmasked, good phonation, not icteric  Neck: supple, no lymphadenopathy  Respiratory: clear to ascultation bilaterally, no wheezing  Cardiovascular: S1 and S2, regular rate and rhythm, no murmurs rubs or gallops  Gastrointestinal: soft, non-tender, non-distended, +bowel sounds, no rebound or guarding, healing midline abdominal wound, CDI, no drains  Extremities: No peripheral edema, no cyanosis or clubbing  Vascular: 2+ peripheral pulses, no venous stasis  Neurological: A/O x 3, no focal deficits, no asterixis  Psychiatric: Normal mood, normal affect  Skin: No rashes, not jaundiced    LABS:                        8.0    7.83  )-----------( 15       ( 20 Oct 2023 10:54 )             24.1     10-20    137  |  106  |  9   ----------------------------<  122<H>  4.7   |  27  |  0.45<L>    Ca    6.8<L>      20 Oct 2023 10:54  Phos  2.9     10-20  Mg     1.6     10-20    TPro  4.6<L>  /  Alb  1.5<L>  /  TBili  0.9  /  DBili  x   /  AST  25  /  ALT  20  /  AlkPhos  151<H>  10-20    PT/INR - ( 20 Oct 2023 05:26 )   PT: 12.4 sec;   INR: 1.10 ratio         PTT - ( 20 Oct 2023 05:26 )  PTT:29.3 sec  LIVER FUNCTIONS - ( 20 Oct 2023 10:54 )  Alb: 1.5 g/dL / Pro: 4.6 gm/dL / ALK PHOS: 151 U/L / ALT: 20 U/L / AST: 25 U/L / GGT: x             RADIOLOGY & ADDITIONAL STUDIES: PACS CT reviewed, PD stent by surgery in place, RP fluid collection

## 2023-10-20 NOTE — H&P ADULT - ASSESSMENT
62 year old F transfered from [facility], pt with hx of pancreatic cancer, s/p 6 months of neoadjuvant FOLFIRINOX chemotherapy and pre-operative RT (completed about 2 months prior) followed by Whipple resection with Dr. Tolentino on 9/21/23.  PT with low appetite, chronic diarrhea, found to have small retropancreatic collection    Plan:    Admit to Dr. Tolentino  Pain control  IVF  Home meds  DVT pxx held for possible drainaga  PT assessment  Diet:NPO  GI consult  IR consult    Case discussed with Dr. Tolentino

## 2023-10-20 NOTE — H&P ADULT - HISTORY OF PRESENT ILLNESS
62 year old F transfered from [facility], pt with hx of pancreatic cancer, s/p 6 months of neoadjuvant FOLFIRINOX chemotherapy and pre-operative RT (completed about 2 months prior) followed by Whipple resection with Dr. Tolentino on 9/21/23. Her daughter called 911   because she has not been well for 2 weeks, in bed, not eating well. She had a lot of diarrhea post the surgery. She denies any fevers, chills, nausea, diarrhea vomiting. She does admit to some weight loss in the last week, but is not able to quantify how much. She denies fever. Denies sick contacts. has no cough. denies urinary symptoms. CT scan of the chest abd pelvis shows emphysema in lungs/ without infiltrates. Abd portion showed small retropancretic fluid collection and small fluid collection around subhepatic space.   Fever was noted at [facility].

## 2023-10-20 NOTE — PATIENT PROFILE ADULT - STATED REASON FOR ADMISSION
Patient states that she will keep her appointment time as is.    direct admit from [facility]   originally admitted to [facility] with generalized weakness

## 2023-10-20 NOTE — H&P ADULT - ATTENDING COMMENTS
This patient is well known to me. I agree with the assessment. She is currently stable but was clearly septic. We will assess he for the need for drainage. The retroperitoneal fluid is in a difficult location to drain percutaneously so I have communicated with Dr Frost for possible endoscopic drainage if she shows signs of not responding. She remains hemodynamically stable at the present time and her WBC has normailzed. I added Flagyl until all cultures and sensitivities are back.

## 2023-10-20 NOTE — CONSULT NOTE ADULT - REASON FOR ADMISSION
Transfer from [facility] for management of a peripancreatic fluid collection.
[facility] Transfer for Peripancreatic Fluid Collection S/p Samir 09/2023
fluid collection

## 2023-10-20 NOTE — CONSULT NOTE ADULT - SUBJECTIVE AND OBJECTIVE BOX
Patient is a 62y old  Female who presents with a chief complaint of [facility] Transfer for Peripancreatic Fluid Collection S/p Whipple 09/2023 (20 Oct 2023 09:47)    HPI:  62 year old F transfered from [facility], pt with hx of pancreatic cancer, s/p 6 months of neoadjuvant FOLFIRINOX chemotherapy and pre-operative RT (completed about 2 months prior) followed by Whipple resection with Dr. Tolentino on 9/21/23. Her daughter called 911 because she has not been well for 2 weeks, in bed, not eating well. She had a lot of diarrhea post the surgery. She denies any fevers, chills, nausea, diarrhea vomiting. She does admit to some weight loss in the last week, but is not able to quantify how much. She denies fever. Denies sick contacts. has no cough. denies urinary symptoms. CT scan of the chest abd pelvis shows emphysema in lungs/ without infiltrates. Abd portion showed small retropancreatic fluid collection and small fluid collection around subhepatic space.  Fever was noted at [facility]. Noted with positive blood cx enterobacterales urine cx also growing gnrs, was given IV zosyn/flagyl.    PMH: as above  PSH: as above  Meds: per reconciliation sheet, noted below  MEDICATIONS  (STANDING):  amLODIPine   Tablet 10 milliGRAM(s) Oral daily  budesonide 160 MICROgram(s)/formoterol 4.5 MICROgram(s) Inhaler 2 Puff(s) Inhalation two times a day  dextrose 5% + sodium chloride 0.9% 1000 milliLiter(s) (75 mL/Hr) IV Continuous <Continuous>  gabapentin 100 milliGRAM(s) Oral three times a day  influenza   Vaccine 0.5 milliLiter(s) IntraMuscular once  meropenem Injectable 1000 milliGRAM(s) IV Push every 8 hours  pantoprazole  Injectable 40 milliGRAM(s) IV Push daily  potassium chloride  10 mEq/100 mL IVPB 10 milliEquivalent(s) IV Intermittent every 1 hour  tiotropium 2.5 MICROgram(s) Inhaler 2 Puff(s) Inhalation daily      Allergies    No Known Allergies    Intolerances      Social: no smoking, no alcohol, no illegal drugs; no recent travel, no exposure to TB  FAMILY HISTORY:     no history of premature cardiovascular disease in first degree relatives    ROS: the patient denies fever, no chills, no HA, no dizziness, no sore throat, no blurry vision, no CP, no palpitations, no SOB, no cough, + abdominal pain, no diarrhea, no N/V, no dysuria, no leg pain, no claudication, no rash, no joint aches, no rectal pain or bleeding, no night sweats    All other systems reviewed and are negative    Vital Signs Last 24 Hrs  T(C): 37.1 (20 Oct 2023 09:20), Max: 37.1 (20 Oct 2023 09:20)  T(F): 98.7 (20 Oct 2023 09:20), Max: 98.7 (20 Oct 2023 09:20)  HR: 76 (20 Oct 2023 10:00) (62 - 76)  BP: 134/72 (20 Oct 2023 11:00) (115/84 - 140/67)  BP(mean): 91 (20 Oct 2023 11:00) (80 - 94)  RR: --  SpO2: 91% (20 Oct 2023 11:00) (91% - 100%)      Daily     Daily     PE:  Constitutional: NAD   HEENT: NC/AT, EOMI, PERRLA, conjunctivae clear; ears and nose atraumatic; pharynx benign  Neck: supple; thyroid not palpable  Back: no tenderness  Respiratory: respiratory effort normal; clear to auscultation  Cardiovascular: S1S2 regular, no murmurs  Abdomen: soft, tender, not distended, positive BS; liver and spleen WNL  Genitourinary: no suprapubic tenderness  Lymphatic: no LN palpable  Musculoskeletal: no muscle tenderness, no joint swelling or tenderness  Extremities: no pedal edema  Neurological/ Psychiatric: AxOx3, Judgement and insight normal;  moving all extremities  Skin: no rashes; no palpable lesions    Labs: all available labs reviewed                        8.0    7.83  )-----------( 15       ( 20 Oct 2023 10:54 )             24.1     10-20    136  |  104  |  11  ----------------------------<  133<H>  2.6<LL>   |  24  |  0.37<L>    Ca    6.9<L>      20 Oct 2023 08:27  Phos  3.7     10-20  Mg     2.0     10-20    TPro  4.2<L>  /  Alb  1.4<L>  /  TBili  0.9  /  DBili  x   /  AST  26  /  ALT  16  /  AlkPhos  138<H>  10-20     LIVER FUNCTIONS - ( 20 Oct 2023 08:27 )  Alb: 1.4 g/dL / Pro: 4.2 gm/dL / ALK PHOS: 138 U/L / ALT: 16 U/L / AST: 26 U/L / GGT: x           Urinalysis Basic - ( 20 Oct 2023 08:27 )    Color: x / Appearance: x / SG: x / pH: x  Gluc: 133 mg/dL / Ketone: x  / Bili: x / Urobili: x   Blood: x / Protein: x / Nitrite: x   Leuk Esterase: x / RBC: x / WBC x   Sq Epi: x / Non Sq Epi: x / Bacteria: x        Radiology: all available radiological tests reviewed      ACC: [id number] EXAM:  XR CHEST PA LAT 2V                          PROCEDURE DATE:  10/08/2022          INTERPRETATION:  EXAM: PA and lateral chest.    COMPARISON: None.    CLINICAL INFORMATION: Shortness of breath.    FINDINGS:    The cardiac and mediastinal silhouettes are within normal limits.  The lungs are clear.  There are no pleural effusions.  There is an old, healed right-sided rib fracture.    IMPRESSION: No acute pulmonary disease        Advanced directives addressed: full resuscitation

## 2023-10-20 NOTE — PROGRESS NOTE ADULT - NS ATTEND AMEND GEN_ALL_CORE FT
1yo female with PMHx pancreatic cancer, s/p 6 months of neoadjuvant FOLFIRINOX chemotherapy and pre-operative RT (completed about 2 months prior) followed by Whipple resection with Dr. Tolentino on 9/21/23. transferred from [facility] for possible drainage of small retropancreatic collection found on CT imaging  On admission to [facility], pt had WBC 9.7k platelet count 32 (on 10/7/23 platelet count was 433). Hgb 9.7.   Of note As per [facility] notes, there was a RRT for hypoxic and tachycardia round to be in rapid Aflutter- resolved with medical mgmt, WBC increased 25k. pt stated she has a platelet transfusion at [facility]  TTE 10/19 revealing EF 35-40%, severe MR.     Admitted for   1. Sepsis 2/2 small retropancreatic collection  2. GNR Bacteremia - urinary vs intra abd source  3. Normocytic Anemia   4. Severe thrombocytopenia  5. Hypokalemia      Plan:  SICU  replace lytes  Repeat CT  HEn/ONC consult for thrombocytopenia  ID consult  Hilda Ordaz

## 2023-10-20 NOTE — PATIENT PROFILE ADULT - FUNCTIONAL ASSESSMENT - BASIC MOBILITY 6.
4-calculated by average/Not able to assess (calculate score using [facility] averaging method)

## 2023-10-20 NOTE — CONSULT NOTE ADULT - ASSESSMENT
62 year old F transfered from [facility], pt with hx of pancreatic cancer, s/p 6 months of neoadjuvant FOLFIRINOX chemotherapy and pre-operative RT (completed about 2 months prior) followed by Whipple resection with Dr. Tolentino on 9/21/23. Her daughter called 911 because she has not been well for 2 weeks, in bed, not eating well. She had a lot of diarrhea post the surgery. She denies any fevers, chills, nausea, diarrhea vomiting. She does admit to some weight loss in the last week, but is not able to quantify how much. She denies fever. Denies sick contacts. has no cough. denies urinary symptoms. CT scan of the chest abd pelvis shows emphysema in lungs/ without infiltrates. Abd portion showed small retropancreatic fluid collection and small fluid collection around subhepatic space.  Fever was noted at [facility]. Noted with positive blood cx enterobacterales urine cx also growing gnrs, was given IV zosyn/flagyl.    1. Sepsis with Enterobacterales. Abd fluid collection. UTI. Pancreatic cancer s/p whipple resection, chemotherapy, xrt. Immunocompromised host  - blood cx from 10/19 growing Enterobacterales, urine cx growing GNRs - has growing ESBL Ecoli in past   - s/p zosyn, flagyl, change abx to IV meropenem [id number]  - continue with antibiotic coverage  - surgical f/u noted, IR consulted for possible drainage of abd collections  - fu cbc  - tolerating abx well so far; no side effects noted  - reason for abx use and side effects reviewed with patient  - supportive care  - contact precautions    2. other issues - care per medicine

## 2023-10-20 NOTE — CONSULT NOTE ADULT - ASSESSMENT
66 y/o F with a PMhx of pancreatic CA, s/p FOLFIRINOX x6 months with RT & more recently Whipple resection 09/2023 with SurgOn Dr Tolentino, now admitted with retro pancreatic and sub hepatic fluid collection, currently with acute thrombocytopenia and worsening anemia.    # Pancreatic Adenocarcinoma S/p Chemo , RT & Surgery    - Jaundice presentation 09/2022, Total bilirubin 17, Abd US with pancreatic lesion, transferred to [facility]  - CT imaging revealed an infiltrative mass involving the inferior pancreatic head and third portion of the duodenum, concerning for pancreatic adenocarcinoma. Tumor encased (360 degrees) the superior mesenteric artery. There was also distal CBD obstruction by tumor.  - Pathology revealed adenocarcinoma, proficient mismatch repair ; Morphologically, the tissue resembled that of intestinal origin, with focal presence of goblet cells. Felt to be an intestinal subtype of pancreatic ductal adenocarcinoma. Disease determined unresectable at that time.  - S/p FOLFIRINOX 10/25/22 - 04/25/23  - CT CAP 01/2023 reveals regression of disease, no longer encasing SMA, <180 deg abutment of SMV  - CT CAP 06/06/23 revealed pancreatic tumor is no longer visible as discrete mass. No CT evidence of metastatic disease in the chest, abdomen and pelvis  - S/p Whipple resection with SurgOnc Dr Tolentino 09/21/2023    - No plan for additional inpatient cancer directed therapy at this time  - Continue necessary supportive measures      # Acute Normocytic Anemia & Thrombocytopenia     - Hgb dropped down to 7.8 g/dL, currently 8.1 g/dL ---> higher baseline 10.0 - 11.0 g/dL  - Plts acutely dropped to <20K, currently 14K ---> was normal in the past 200K+  - Does have hx of GIB s/p initial ERCP ----> FOBT pending  - Etiology unclear at this time, multifactorial in setting of known pancreatic malignancy, s/p chemo, RT & surgery now with possible infectious/inflammatory process contributing to consumption  - As per CC PA, patient received Plt transfusion at [facility] [place] prior to transfer  - Transfuse Plts at this time, continue if <10K or <20K with bleeding  - Transfuse blood as necessary, if hgb continues to drop or patient becomes hemodynamically unstable  - Continue supportive measures    61 y/o F with a PMhx of pancreatic CA, s/p FOLFIRINOX x6 months with RT & more recently Whipple resection 09/2023 with SurgOnc Dr Tolentino, now admitted with retro pancreatic and sub hepatic fluid collection, currently with acute thrombocytopenia and worsening anemia.    # Pancreatic Adenocarcinoma S/p Chemo , RT & Surgery    - Primary Heme/Onc Dr Ryan Candelaria     - Jaundice presentation 09/2022, Total bilirubin 17, Abd US with pancreatic lesion, transferred to [facility]  - CT imaging revealed an infiltrative mass involving the inferior pancreatic head and third portion of the duodenum, concerning for pancreatic adenocarcinoma. Tumor encased (360 degrees) the superior mesenteric artery. There was also distal CBD obstruction by tumor.  - Pathology revealed adenocarcinoma, proficient mismatch repair ; Morphologically, the tissue resembled that of intestinal origin, with focal presence of goblet cells. Felt to be an intestinal subtype of pancreatic ductal adenocarcinoma. Disease determined unresectable at that time.  - S/p FOLFIRINOX 10/25/22 - 04/25/23  - CT CAP 01/2023 reveals regression of disease, no longer encasing SMA, <180 deg abutment of SMV  - CT CAP 06/06/23 revealed pancreatic tumor is no longer visible as discrete mass. No CT evidence of metastatic disease in the chest, abdomen and pelvis  - S/p Whipple resection with SurgOnc Dr Tolentino 09/21/2023    - No plan for additional inpatient cancer directed therapy at this time  - Continue necessary supportive measures      # Acute Normocytic Anemia & Thrombocytopenia     - Hgb dropped down to 7.8 g/dL, currently 8.1 g/dL ---> higher baseline 10.0 - 11.0 g/dL  - Plts acutely dropped to <20K, currently 14K ---> was normal in the past 200K+  - Does have hx of GIB s/p initial ERCP ----> FOBT pending  - Etiology unclear at this time, multifactorial in setting of known pancreatic malignancy, s/p chemo, RT & surgery now with possible infectious/inflammatory process contributing to consumption  - As per CC PA, [place] [facility] [place]  - Transfuse Plts at this time, continue if <10K or <20K with bleeding  - Transfuse blood as necessary, if hgb continues to drop or patient becomes hemodynamically unstable  - Continue supportive measures    61 y/o F with a PMhx of pancreatic CA, s/p FOLFIRINOX x6 months with RT & more recently Whipple resection 09/2023 with SurgOn Dr Tolentino, now admitted with retro pancreatic and sub hepatic fluid collection, currently with acute thrombocytopenia and worsening anemia.      # Pancreatic Adenocarcinoma S/p Chemo , RT & Surgery    - Primary Heme/Onc Dr Ryan Candelaria     - Jaundice presentation 09/2022, Total bilirubin 17, Abd US with pancreatic lesion, transferred to [facility]  - CT imaging revealed an infiltrative mass involving the inferior pancreatic head and third portion of the duodenum, concerning for pancreatic adenocarcinoma. Tumor encased (360 degrees) the superior mesenteric artery. There was also distal CBD obstruction by tumor.  - Pathology revealed adenocarcinoma, proficient mismatch repair ; Morphologically, the tissue resembled that of intestinal origin, with focal presence of goblet cells. Felt to be an intestinal subtype of pancreatic ductal adenocarcinoma. Disease determined unresectable at that time.  - S/p FOLFIRINOX 10/25/22 - 04/25/23  - CT CAP 01/2023 reveals regression of disease, no longer encasing SMA, <180 deg abutment of SMV  - CT CAP 06/06/23 revealed pancreatic tumor is no longer visible as discrete mass. No CT evidence of metastatic disease in the chest, abdomen and pelvis  - Most recent  (09/11/23) ---> 11  - S/p Whipple resection with SurgOncari Tolentino 09/21/2023    - No plan for additional inpatient cancer directed therapy at this time  - Continue necessary supportive measures      # Acute Normocytic Anemia & Thrombocytopenia     - Hgb dropped down to 7.8 g/dL, currently 8.1 g/dL ---> higher baseline 10.0 - 11.0 g/dL  - Plts acutely dropped to <20K, currently 14K ---> was normal in the past 200K+  - Does have hx of GIB s/p initial ERCP ----> FOBT pending  - Etiology unclear at this time, multifactorial in setting of known pancreatic malignancy, s/p chemo, RT & surgery now with possible infectious/inflammatory process contributing to consumption  - As per CC PA, [place] [facility] [place]  - Transfuse Plts at this time, continue if <10K or <20K with bleeding  - Transfuse blood as necessary, if hgb continues to drop or patient becomes hemodynamically unstable  - Continue supportive measures       # Peripancreatic & Subhepatic Fluid Collection S/p Whipple    - Known pancreatic adenocarcinoma, s/p chemo, RT 10/2022 - 04/2023  - S/p Whipple resection 09/21/2023  - Poor PO intake, weight loss as per family  - [place] [facility] [place] fluid collection, currently pending repeat CT AP imaging  - SurgOnc following 63 y/o F with a PMhx of pancreatic CA, s/p FOLFIRINOX x6 months with RT & more recently Whipple resection 09/2023 with SurgOn Dr Tolentino, now admitted with retro pancreatic and sub hepatic fluid collection, currently with acute thrombocytopenia and worsening anemia.      # Pancreatic Adenocarcinoma S/p Chemo , RT & Surgery    - Primary Heme/Onc Dr Ryan Candelaria     - Jaundice presentation 09/2022, Total bilirubin 17, Abd US with pancreatic lesion, transferred to [facility]  - CT imaging revealed an infiltrative mass involving the inferior pancreatic head and third portion of the duodenum, concerning for pancreatic adenocarcinoma. Tumor encased (360 degrees) the superior mesenteric artery. There was also distal CBD obstruction by tumor.  - Pathology revealed adenocarcinoma, proficient mismatch repair ; Morphologically, the tissue resembled that of intestinal origin, with focal presence of goblet cells. Felt to be an intestinal subtype of pancreatic ductal adenocarcinoma. Disease determined unresectable at that time.  - S/p FOLFIRINOX 10/25/22 - 04/25/23  - CT CAP 01/2023 reveals regression of disease, no longer encasing SMA, <180 deg abutment of SMV  - CT CAP 06/06/23 revealed pancreatic tumor is no longer visible as discrete mass. No CT evidence of metastatic disease in the chest, abdomen and pelvis  - Most recent  (09/11/23) ---> 11  - S/p Whipple resection with SurgOnc Dr Toelntino 09/21/2023    - No plan for additional inpatient cancer directed therapy at this time  - Continue necessary supportive measures      # Acute Normocytic Anemia & Thrombocytopenia     - Hgb dropped down to 7.8 g/dL, currently 8.1 g/dL ---> higher baseline 10.0 - 11.0 g/dL  - Plts acutely dropped to <20K, currently 14K ---> was normal in the past 200K+  - Does have hx of GIB s/p initial ERCP ----> FOBT pending  - Etiology unclear at this time, multifactorial in setting of known pancreatic malignancy, s/p chemo, RT & surgery now with possible infectious/inflammatory process contributing to consumption  - As per CC PA, patient was febrile & received Plt transfusion at [facility] [place] prior to transfer  - Currently afebrile  - Transfuse Plts at this time, continue if <10K or <20K with bleeding  - Transfuse blood as necessary, if hgb continues to drop or patient becomes hemodynamically unstable  - Continue supportive measures       # Peripancreatic & Subhepatic Fluid Collection S/p Whipple    - Known pancreatic adenocarcinoma, s/p chemo, RT 10/2022 - 04/2023  - S/p Whipple resection 09/21/2023  - Poor PO intake, weight loss as per family  - Currently afebrile, but did have fever at [facility] [place]  - ID consulted  - CT imaging at [facility] [place] had revealed intra abdominal fluid collection, currently pending repeat CT AP imaging  - SurgOnc following 61 y/o F with a PMhx of pancreatic CA, s/p FOLFIRINOX x6 months with RT & more recently Whipple resection 09/2023 with SurgOn Dr Tolentino, now admitted with retro pancreatic and sub hepatic fluid collection, currently with acute thrombocytopenia and worsening anemia.      # Pancreatic Adenocarcinoma S/p Chemo , RT & Surgery    - Primary Heme/Onc Dr Ryan Candelaria     - Jaundice presentation 09/2022, Total bilirubin 17, Abd US with pancreatic lesion, transferred to [facility]  - CT imaging revealed an infiltrative mass involving the inferior pancreatic head and third portion of the duodenum, concerning for pancreatic adenocarcinoma. Tumor encased (360 degrees) the superior mesenteric artery. There was also distal CBD obstruction by tumor.  - Pathology revealed adenocarcinoma, proficient mismatch repair ; Morphologically, the tissue resembled that of intestinal origin, with focal presence of goblet cells. Felt to be an intestinal subtype of pancreatic ductal adenocarcinoma. Disease determined unresectable at that time.  - S/p FOLFIRINOX 10/25/22 - 04/25/23  - CT CAP 01/2023 reveals regression of disease, no longer encasing SMA, <180 deg abutment of SMV  - CT CAP 06/06/23 revealed pancreatic tumor is no longer visible as discrete mass. No CT evidence of metastatic disease in the chest, abdomen and pelvis  - Most recent  (09/11/23) ---> 11  - S/p Whipple resection with SurgOnc Dr Tolentino 09/21/2023    - No plan for additional inpatient cancer directed therapy at this time  - Continue necessary supportive measures      # Acute Normocytic Anemia & Thrombocytopenia     - Hgb down to 8.1 g/dL ---> baseline 10.0 - 11.0 g/dL, most recently 10/02  - Plts acutely dropped from 400K+ to <50K between 10/02 and 10/17  - Does have hx of GIB s/p initial ERCP ----> FOBT pending  - Multifactorial in setting of known pancreatic malignancy, s/p chemo, RT & surgery, however patient clinically consistent with infectious/inflammatory process likely contributing to consumption  - As per CC PA, patient received Plt transfusion at [facility] [place] prior to transfer    - Transfuse Plts at this time, continue if <10K or <20K with bleeding  - Transfuse blood as necessary, if hgb continues to drop or patient becomes hemodynamically unstable  - Continue supportive measures       # Peripancreatic & Subhepatic Fluid Collection S/p Whipple    - Known pancreatic adenocarcinoma, s/p chemo, RT 10/2022 - 04/2023  - S/p Whipple resection 09/21/2023  - Poor PO intake, weight loss as per family  - Currently afebrile, but did have fever at [facility] [place]  - WBC count was up to 25K as of 10/19, currently improved  - ID consulted; noted positive blood cultures   - CT imaging at [facility] [place] had revealed intra abdominal fluid collection, currently pending repeat CT AP imaging  - SurgOnc following  - Continue necessary supportive measures  63 y/o F with a PMhx of pancreatic CA, s/p FOLFIRINOX x6 months with RT & more recently Whipple resection 09/2023 with SurgOn Dr Tolentino, now admitted with retro pancreatic and sub hepatic fluid collection, currently with acute thrombocytopenia and worsening anemia.      # Pancreatic Adenocarcinoma S/p Chemo , RT & Surgery    - Primary Heme/Onc Dr Ryan Candelaria     - Jaundice presentation 09/2022, Total bilirubin 17, Abd US with pancreatic lesion, transferred to [facility]  - CT imaging revealed an infiltrative mass involving the inferior pancreatic head and third portion of the duodenum, concerning for pancreatic adenocarcinoma. Tumor encased (360 degrees) the superior mesenteric artery. There was also distal CBD obstruction by tumor.  - Pathology revealed adenocarcinoma, proficient mismatch repair ; Morphologically, the tissue resembled that of intestinal origin, with focal presence of goblet cells. Felt to be an intestinal subtype of pancreatic ductal adenocarcinoma. Disease determined unresectable at that time.  - S/p FOLFIRINOX 10/25/22 - 04/25/23  - CT CAP 01/2023 reveals regression of disease, no longer encasing SMA, <180 deg abutment of SMV  - CT CAP 06/06/23 revealed pancreatic tumor is no longer visible as discrete mass. No CT evidence of metastatic disease in the chest, abdomen and pelvis.  S/p brief course of RT to pancreatic tumor bed.   - S/p Whipple resection with SurgOn Dr Tolentino 09/21/2023. Path ypt1c, ypN1 ( 1/15)         # Acute Normocytic Anemia & Thrombocytopenia     - Hgb down to 8.1 g/dL ---> baseline 10.0 - 11.0 g/dL, most recently 10/02  - Plts acutely dropped from 400K+ to <50K between 10/02 and 10/17  - Does have hx of GIB s/p initial ERCP ----> FOBT pending  - Multifactorial in setting of known pancreatic malignancy, s/p chemo, RT & surgery, however patient clinically consistent with infectious/inflammatory process likely contributing to consumption  - As per CC PA, patient received Plt transfusion at [facility] [place] prior to transfer    - Transfuse Plts at this time, continue if <10K or <20K with bleeding  - Transfuse blood as necessary, if hgb continues to drop or patient becomes hemodynamically unstable  - Continue supportive measures       # Peripancreatic & Subhepatic Fluid Collection S/p Whipple    - Known pancreatic adenocarcinoma, s/p chemo, RT 10/2022 - 04/2023  - S/p Whipple resection 09/21/2023  - Poor PO intake, weight loss as per family  - Currently afebrile, but did have fever at [facility] [place]  - WBC count was up to 25K as of 10/19, currently improved  - ID consulted; noted positive blood cultures   - CT imaging at [facility] [place] had revealed intra abdominal fluid collection, currently pending repeat CT AP imaging  - SurgOnc following  - Continue necessary supportive measures  63 y/o F with a PMhx of pancreatic CA, s/p FOLFIRINOX x6 months with RT & more recently Whipple resection 09/2023 with SurgOn Dr Tolentino, now admitted with retro pancreatic and sub hepatic fluid collection, currently with acute thrombocytopenia and worsening anemia.      # Pancreatic Adenocarcinoma S/p Chemo , RT & Surgery    - Primary Heme/Onc Dr Ryan Candelaria     - Jaundice presentation 09/2022, Total bilirubin 17, Abd US with pancreatic lesion, transferred to [facility]  - CT imaging revealed an infiltrative mass involving the inferior pancreatic head and third portion of the duodenum, concerning for pancreatic adenocarcinoma. Tumor encased (360 degrees) the superior mesenteric artery. There was also distal CBD obstruction by tumor.  - Pathology revealed adenocarcinoma, proficient mismatch repair ; Morphologically, the tissue resembled that of intestinal origin, with focal presence of goblet cells. Felt to be an intestinal subtype of pancreatic ductal adenocarcinoma. Disease determined unresectable at that time.  - S/p FOLFIRINOX 10/25/22 - 04/25/23  - CT CAP 01/2023 reveals regression of disease, no longer encasing SMA, <180 deg abutment of SMV  - CT CAP 06/06/23 revealed pancreatic tumor is no longer visible as discrete mass. No CT evidence of metastatic disease in the chest, abdomen and pelvis.  S/p brief course of RT to pancreatic tumor bed.   - S/p Whipple resection with SurgOn Dr Tolentino 09/21/2023. Path ypt1c, ypN1 ( 1/15)         # Acute Normocytic Anemia & Thrombocytopenia     - Hgb down to 8.1 g/dL ---> baseline 10.0 - 11.0 g/dL, most recently 10/02  - Plts acutely dropped from 400K+ to <50K between 10/02 and 10/17  - Does have hx of GIB s/p initial ERCP ----> FOBT pending  - Multifactorial in setting of known pancreatic malignancy, s/p chemo, RT & surgery, however patient clinically consistent with infectious/inflammatory process likely contributing to consumption  - As per CC PA, patient received Plt transfusion [place] [facility] [place]    - Transfuse Plts at this time, continue if <10- 15 K or bleeding  - Transfuse blood as necessary, if hgb continues to drop or patient becomes hemodynamically unstable  - Continue supportive measures       # Peripancreatic & Subhepatic Fluid Collection S/p Whipple    - Known pancreatic adenocarcinoma, s/p chemo, RT 10/2022 - 04/2023  - S/p Whipple resection 09/21/2023  - Poor PO intake, weight loss as per family  - Currently afebrile, but did have fever at [facility] [place]  - WBC count was up to 25K as of 10/19, currently improved  - ID consulted; noted positive blood cultures   - CT imaging at [facility] [place] had revealed intra abdominal fluid collection, currently pending repeat CT AP imaging  - SurgOnc following  - Continue necessary supportive measures

## 2023-10-20 NOTE — PATIENT PROFILE ADULT - FALL HARM RISK - HARM RISK INTERVENTIONS

## 2023-10-21 NOTE — PROVIDER CONTACT NOTE (OTHER) - RECOMMENDATIONS
Provider aware, HR decreased to 120, patient feels SOB resolved, IVF and IV tylenol running, will cont to monitor

## 2023-10-21 NOTE — PROGRESS NOTE ADULT - SUBJECTIVE AND OBJECTIVE BOX
Pt seen at bedside, resting comfortable. Passing gas and having diarrhea  Pt denies pain, nausea, vomiting, fever, chills    Physical Exam:  General: AAOx3, Well developed, NAD  Chest: Normal respiratory effort  Heart: RRR  Abdomen: Soft, NTND, no masses, midline incision healing well  Neuro/Psych: No localized deficits. Normal spech, normal tone  Skin: Normal, no rashes, no lesions noted.   Extremities: Warm, well perfused, no edema, Pulses intact    Vitals:  T(C): 37.2 (10-20 @ 22:00), Max: 38.3 (10-20 @ 21:00)  HR: 86 (10-21 @ 00:00) (62 - 109)  BP: 107/56 (10-21 @ 00:00) (102/51 - 160/73)  RR: 19 (10-21 @ 00:00) (17 - 26)  SpO2: 100% (10-21 @ 00:00) (91% - 100%)    10-19 @ 07:  -  10-20 @ 07:00  --------------------------------------------------------  IN:    IV PiggyBack: 100 mL  Total IN: 100 mL    OUT:  Total OUT: 0 mL    Total NET: 100 mL      10-20 @ 07:01  -  10-21 @ 00:47  --------------------------------------------------------  IN:    dextrose 5% + sodium chloride 0.9% w/ Additives: 825 mL    IV PiggyBack: 300 mL    Platelets - Single Donor: 210 mL  Total IN: 1335 mL    OUT:  Total OUT: 0 mL    Total NET: 1335 mL      10-20 @ 19:02                    7.9  CBC: 7.36>)-------(<28                     23.4                 137 | 105 | 7    CMP:  ----------------------< 114               3.9 | 28 | 0.41                      Ca:7.4  Phos:-  Mg:-               1.7|      |22        LFTs:  ------|155|-----             -|      |-  10-20 @ 10:54                    8.0  CBC: 7.83>)-------(<15                     24.1                 137 | 106 | 9    CMP:  ----------------------< 122               4.7 | 27 | 0.45                      Ca:6.8  Phos:2.9  M.6               0.9|      |25        LFTs:  ------|151|-----             -|      |-  10-20 @ 08:27                    8.1  CBC: 7.47>)-------(<14                     23.5                 136 | 104 | 11    CMP:  ----------------------< 133               2.6 | 24 | 0.37                      Ca:6.9  Phos:-  Mg:-               0.9|      |26        LFTs:  ------|138|-----             -|      |-  10-20 @ 05:26                    7.8  CBC: 7.78>)-------(<14                     22.7                 138 | 103 | 11    CMP:  ----------------------< 113               2.5 | 25 | 0.35                      Ca:7.2  Phos:3.7  M.0               1.0|      |26        LFTs:  ------|155|-----             -|      |-      Current Inpatient Medications:  acetaminophen   IVPB .. 650 milliGRAM(s) IV Intermittent every 6 hours PRN  albuterol    90 MICROgram(s) HFA Inhaler 2 Puff(s) Inhalation every 6 hours PRN  amLODIPine   Tablet 10 milliGRAM(s) Oral daily  budesonide 160 MICROgram(s)/formoterol 4.5 MICROgram(s) Inhaler 2 Puff(s) Inhalation two times a day  dextrose 5% + sodium chloride 0.9% 1000 milliLiter(s) (75 mL/Hr) IV Continuous <Continuous>  gabapentin 100 milliGRAM(s) Oral three times a day  influenza   Vaccine 0.5 milliLiter(s) IntraMuscular once  meropenem Injectable 1000 milliGRAM(s) IV Push every 8 hours  morphine  - Injectable 2 milliGRAM(s) IV Push every 4 hours PRN  ondansetron Injectable 4 milliGRAM(s) IV Push every 6 hours PRN  pantoprazole  Injectable 40 milliGRAM(s) IV Push daily  senna 2 Tablet(s) Oral at bedtime PRN  tiotropium 2.5 MICROgram(s) Inhaler 2 Puff(s) Inhalation daily

## 2023-10-21 NOTE — DIETITIAN INITIAL EVALUATION ADULT - PERTINENT LABORATORY DATA
10-21    137  |  106  |  5<L>  ----------------------------<  128<H>  3.8   |  28  |  0.40<L>    Ca    7.1<L>      21 Oct 2023 05:52  Phos  2.3     10-21  Mg     1.7     10-21    TPro  4.2<L>  /  Alb  1.5<L>  /  TBili  1.4<H>  /  DBili  x   /  AST  16  /  ALT  14  /  AlkPhos  135<H>  10-21  A1C with Estimated Average Glucose Result: 5.3 % (09-12-23 @ 09:50)

## 2023-10-21 NOTE — PROVIDER CONTACT NOTE (OTHER) - ASSESSMENT
Patient alert and oriented, able to follow commands,  + rigors, + diarrhea x2 since start of the shift, denies chest pain but feels "very short of breath" stated this has happened to her previously prior to her transfer here

## 2023-10-21 NOTE — DIETITIAN INITIAL EVALUATION ADULT - OTHER INFO
61 y/o F transferred from [facility], pt with hx of pancreatic cancer, s/p 6 months of neoadjuvant FOLFIRINOX chemotherapy and pre-operative RT (completed about 2 months prior) followed by Whipple resection with Dr. Tolentino on 9/21/23. Her daughter called 911 because she has not been well for 2 weeks, in bed, not eating well. She had a lot of diarrhea post the surgery. She does admit to some weight loss in the last week, but is not able to quantify how much. CT scan of the chest abd pelvis shows emphysema in lungs/ without infiltrates. Abd portion showed small retropancreatic fluid collection and small fluid collection around subhepatic space.     Pt known to nutr services. Upon RD visit was NPO however diet upgraded to low fat; on Creon. UBW stated at 115-119# however s/p whipple x 3 weeks ago endorses wt loss, stating CBW at 100#. Bed scale wt of 105# taken by RD on 10/20; ~13# wt loss/11.01% - severe and clinically significant. NFPE reveals severe muscle/fat wasting - appeared thin, bony. D/w surgical resident, will not initiate PPN at this time however if PO intake does not improve during admission, suggest to initiate - re-consult RD for recs. Will add Premier protein shake TID to optimize nutritional needs (provides 160 kcal, 30 g protein/ shake) - pt receptive. See recommendations below.

## 2023-10-21 NOTE — PROVIDER CONTACT NOTE (OTHER) - SITUATION
Patient febrile with a rectal temp of 100.9, , O2 98% on 50% venti mask after desating at rest to low 80% on 3L NC and then 15% venti mask with minimal improvement, provider at bedside

## 2023-10-21 NOTE — DIETITIAN NUTRITION RISK NOTIFICATION - TREATMENT: THE FOLLOWING DIET HAS BEEN RECOMMENDED
Diet, Regular:   Low Fat (LOWFAT)  Supplement Feeding Modality:  Oral  Ensure Plus High Protein Cans or Servings Per Day:  1       Frequency:  Two Times a day (10-20-23 @ 16:37) [Active]

## 2023-10-21 NOTE — DIETITIAN INITIAL EVALUATION ADULT - NS FNS DIET ORDER
Diet, Regular:   Low Fat (LOWFAT)  Supplement Feeding Modality:  Oral  Ensure Plus High Protein Cans or Servings Per Day:  1       Frequency:  Two Times a day (10-20-23 @ 16:37)

## 2023-10-21 NOTE — PROGRESS NOTE ADULT - SUBJECTIVE AND OBJECTIVE BOX
HPI:    61 y/o F with a PMHx of pancreatic cancer, s/p 6 months of neoadjuvant FOLFIRINOX chemotherapy and pre-operative RT completed 04/2023? followed by Whipple resection with SurgOnc Dr Tolentino on 09/21/23 now admitted to , [place] [facility] [place] of peripancreatic fluid collection and acute thrombocytopenia. According to family, she has not been well for 2 weeks, in bed, not eating well, had a lot of diarrhea post op. The patient admitted to some weight loss in the last week, but is not able to quantify how much. CT AP showed emphysema without infiltrates & small retro pancreatic fluid collection and small fluid collection around subhepatic space. Fever was noted at [facility].    10/20/2023: Seen at bedside, no acute distress, understanding of current medical situation     10/21/2023- Seen at bedside, OOB to chair, in no acute distress, reports tolerating PO well, no N/V      PAST MEDICAL & SURGICAL HISTORY:    Malignant neoplasm of pancreas    Seasonal allergies    Asthma    S/p left hip fracture    History of infusaport central venous catheter insertion    S/P ERCP    H/O Whipple procedure        MEDICATIONS  (STANDING):    amLODIPine   Tablet 10 milliGRAM(s) Oral daily  budesonide 160 MICROgram(s)/formoterol 4.5 MICROgram(s) Inhaler 2 Puff(s) Inhalation two times a day  dextrose 5% + sodium chloride 0.9% 1000 milliLiter(s) (75 mL/Hr) IV Continuous <Continuous>  gabapentin 100 milliGRAM(s) Oral three times a day  influenza   Vaccine 0.5 milliLiter(s) IntraMuscular once  meropenem Injectable 1000 milliGRAM(s) IV Push every 8 hours  pancrelipase  (CREON 12,000 Lipase Units) 2 Capsule(s) Oral three times a day with meals  pantoprazole  Injectable 40 milliGRAM(s) IV Push daily  potassium phosphate / sodium phosphate Powder (PHOS-NaK) 1 Packet(s) Oral once  tiotropium 2.5 MICROgram(s) Inhaler 2 Puff(s) Inhalation daily    MEDICATIONS  (PRN):    acetaminophen   IVPB .. 650 milliGRAM(s) IV Intermittent every 6 hours PRN Temp greater or equal to 38C (100.4F), Mild Pain (1 - 3)  albuterol    90 MICROgram(s) HFA Inhaler 2 Puff(s) Inhalation every 6 hours PRN for bronchospasm  morphine  - Injectable 2 milliGRAM(s) IV Push every 4 hours PRN Severe Pain (7 - 10)  ondansetron Injectable 4 milliGRAM(s) IV Push every 6 hours PRN Nausea  pancrelipase  (CREON 12,000 Lipase Units) 1 Capsule(s) Oral <User Schedule> PRN with snacks, pancreatic insufficiency  senna 2 Tablet(s) Oral at bedtime PRN Constipation        ALLERGIES:    No Known Allergies      FAMILY HISTORY:    Nothing noted      REVIEW OF SYSTEMS:    Constitutional, Eyes, ENT, Cardiovascular, Respiratory, Gastrointestinal, Genitourinary, Musculoskeletal, Integumentary, Neurological, Psychiatric, Endocrine, Heme/Lymph and Allergic/Immunologic review of systems are otherwise negative except as noted in HPI.       VITALS SIGNS LAST 24 HRS:    Vital Signs Last 24 Hrs  T(C): 37.1 (21 Oct 2023 15:58), Max: 38.3 (20 Oct 2023 21:00)  T(F): 98.8 (21 Oct 2023 15:58), Max: 100.9 (20 Oct 2023 21:00)  HR: 83 (21 Oct 2023 16:00) (64 - 109)  BP: 95/48 (21 Oct 2023 16:00) (95/48 - 160/73)  BP(mean): 61 (21 Oct 2023 16:00) (61 - 103)  RR: 19 (21 Oct 2023 16:00) (12 - 25)  SpO2: 94% (21 Oct 2023 16:00) (91% - 100%)    Parameters below as of 21 Oct 2023 16:00  Patient On (Oxygen Delivery Method): room air      PHYSICAL EXAM:    Constitutional: no acute distress  Eyes: no conjunctival infection, anicteric.   ENT: pharynx is unremarkable  Neck: supple without JVD   Pulmonary: clear to auscultation bilaterally   Cardiac: RRR,   Vascular: no calf tenderness, venous stasis changes, varices  Abdomen: normoactive bowel sounds, soft and nontender, no hepatosplenomegaly or masses appreciated  Lymphatic: no peripheral adenopathy appreciated  Musculoskeletal: full range of motion and no deformities appreciated   Skin: normal appearance, no rash/erythema   Neurology: awake, alert, oriented ; no focal deficits       LABS:                          7.4    12.02 )-----------( 26       ( 21 Oct 2023 05:52 )             21.7       10-21    137  |  106  |  5<L>  ----------------------------<  128<H>  3.8   |  28  |  0.40<L>    Ca    7.1<L>      21 Oct 2023 05:52  Phos  2.3     10-21  Mg     1.7     10-21    TPro  4.2<L>  /  Alb  1.5<L>  /  TBili  1.4<H>  /  DBili  x   /  AST  16  /  ALT  14  /  AlkPhos  135<H>  10-21      PT/INR - ( 20 Oct 2023 05:26 )   PT: 12.4 sec;   INR: 1.10 ratio         PTT - ( 20 Oct 2023 05:26 )  PTT:29.3 sec  Urinalysis Basic - ( 20 Oct 2023 08:27 )    Color: x / Appearance: x / SG: x / pH: x  Gluc: 133 mg/dL / Ketone: x  / Bili: x / Urobili: x   Blood: x / Protein: x / Nitrite: x   Leuk Esterase: x / RBC: x / WBC x   Sq Epi: x / Non Sq Epi: x / Bacteria: x        RADIOLOGY & ADDITIONAL STUDIES:    < from: CT Abdomen and Pelvis w/ Oral Cont (10.20.23 @ 12:14) >    ACC: [id number] EXAM:  CT ABDOMEN AND PELVIS OC   ORDERED BY: SUSAN RUSSELL     PROCEDURE DATE:  10/20/2023          INTERPRETATION:  CLINICAL INFORMATION: 62-year-old woman status post   Whipple with retropancreatic fluid collection.    COMPARISON: October 17, 2023    CONTRAST/COMPLICATIONS:  IV Contrast: NONE  Oral Contrast: Omnipaque 300  Complications: None reported at time of study completion    PROCEDURE:  CT of the Abdomen and Pelvis was performed.  Sagittal and coronal reformats wereperformed.    FINDINGS: Evaluation is limited due to lack of intravenous contrast.  LOWER CHEST: Coronary artery calcifications. New small bilateral pleural   effusions. Emphysematous changes.    LIVER: Within normal limits.  BILE DUCTS: Pneumobilia.No ductal dilatation.  GALLBLADDER: Cholecystectomy.  SPLEEN: Within normal limits.  PANCREAS: Status post Whipple procedure. Pancreatic remnant is   unremarkable within the limitations of this unenhanced CT scan.   Pancreatic duct stent in place. Retropancreatic fluid collection is   difficult to visualize, however appears at least mildly decreased in   size, measuring approximately 3.4 x 1.6 cm (2; 45), previously 4.0 x 1.5   cm.  ADRENALS: Within normal limits.  KIDNEYS/URETERS: Within normal limits.    BLADDER: Within normal limits.  REPRODUCTIVE ORGANS: Uterus and adnexa within normal limits.    BOWEL: No bowel obstruction. Appendix is normal.  PERITONEUM: Trace ascites.  VESSELS: Atherosclerotic changes.  RETROPERITONEUM/LYMPH NODES: Nolymphadenopathy.  ABDOMINAL WALL: Postoperative changes in the ventral abdominal wall.  BONES: Within normal limits.    IMPRESSION:  Limited study due to lack of intravenous contrast.    Decrease in size of retropancreatic fluid collection.    New small bilateral pleural effusions.

## 2023-10-21 NOTE — DIETITIAN INITIAL EVALUATION ADULT - LAB (SPECIFY)
consider checking B6, B12, thiamine, folate, carnitine, and copper levels as malnutrition in cause these to be deficient; Monitor daily lytes/min; consider checking serum zinc level

## 2023-10-21 NOTE — DIETITIAN INITIAL EVALUATION ADULT - NSFNSGIIOFT_GEN_A_CORE
I&O's Detail    20 Oct 2023 07:01  -  21 Oct 2023 07:00  --------------------------------------------------------  IN:    dextrose 5% + sodium chloride 0.9% w/ Additives: 1725 mL    IV PiggyBack: 365 mL    Platelets - Single Donor: 210 mL  Total IN: 2300 mL    OUT:  Total OUT: 0 mL    Total NET: 2300 mL      21 Oct 2023 07:01  -  21 Oct 2023 13:12  --------------------------------------------------------  IN:  Total IN: 0 mL    OUT:    Voided (mL): 800 mL  Total OUT: 800 mL    Total NET: -800 mL

## 2023-10-21 NOTE — DIETITIAN INITIAL EVALUATION ADULT - ADD RECOMMEND
1) C/w current PO diet and add Premier protein shake TID to optimize nutritional needs (provides 160 kcal, 30 g protein/ shake). If PO intake remains poor, suggest to liberalize diet to regular to maximize caloric and nutrient intake.   2) Encourage protein-rich foods, maximize food preferences   3) Closely monitor bowel movements, maintain adequate hydration. Suggest to add 3 packets Banatrol 2/2 persistent diarrhea (provides 40 kcal, 0 g protein/ packet)   4) MVI w/ minerals daily to ensure 100% RDA met   5) Consider adding thiamine 100 mg daily 2/2 poor PO intake/ malnutrition   6) Monitor daily lytes/min and replete prn. Check serum zinc level for suspected deficiency. Consider adding 220 mg of zinc sulfate daily 2/2 persistent diarrhea   7) Monitor daily wt to track/trend changes; strict I/Os   8) Confirm goals of care regarding nutrition support. If PO intake does not improve, highly suggest to implement nutrition support, please re-consult RD for recs prn.  RD will continue to monitor PO intake/tolerance, labs, hydration, and wt prn.

## 2023-10-21 NOTE — DIETITIAN INITIAL EVALUATION ADULT - ORAL INTAKE PTA/DIET HISTORY
Reports of typically good PO intake however c/o poor appetite x 3 days PTA. Likely meeting <50% ENN. Likes to consume ensure, water, chicken, broth. Also c/o diarrhea x 3 weeks, s/p whipple procedure.

## 2023-10-21 NOTE — DIETITIAN INITIAL EVALUATION ADULT - PERTINENT MEDS FT
MEDICATIONS  (STANDING):  amLODIPine   Tablet 10 milliGRAM(s) Oral daily  budesonide 160 MICROgram(s)/formoterol 4.5 MICROgram(s) Inhaler 2 Puff(s) Inhalation two times a day  dextrose 5% + sodium chloride 0.9% 1000 milliLiter(s) (75 mL/Hr) IV Continuous <Continuous>  gabapentin 100 milliGRAM(s) Oral three times a day  influenza   Vaccine 0.5 milliLiter(s) IntraMuscular once  meropenem Injectable 1000 milliGRAM(s) IV Push every 8 hours  pancrelipase  (CREON 12,000 Lipase Units) 2 Capsule(s) Oral three times a day with meals  pantoprazole  Injectable 40 milliGRAM(s) IV Push daily  tiotropium 2.5 MICROgram(s) Inhaler 2 Puff(s) Inhalation daily    MEDICATIONS  (PRN):  acetaminophen   IVPB .. 650 milliGRAM(s) IV Intermittent every 6 hours PRN Temp greater or equal to 38C (100.4F), Mild Pain (1 - 3)  albuterol    90 MICROgram(s) HFA Inhaler 2 Puff(s) Inhalation every 6 hours PRN for bronchospasm  morphine  - Injectable 2 milliGRAM(s) IV Push every 4 hours PRN Severe Pain (7 - 10)  ondansetron Injectable 4 milliGRAM(s) IV Push every 6 hours PRN Nausea  pancrelipase  (CREON 12,000 Lipase Units) 1 Capsule(s) Oral <User Schedule> PRN with snacks, pancreatic insufficiency  senna 2 Tablet(s) Oral at bedtime PRN Constipation

## 2023-10-22 NOTE — PROVIDER CONTACT NOTE (CHANGE IN STATUS NOTIFICATION) - ASSESSMENT
Placed on 50% venti, oxygen saturation improved slowly, tremor persists, HR remains >100, EKG at bedside, lab contacted, deep breathing and diversional activities promoted.

## 2023-10-22 NOTE — PROVIDER CONTACT NOTE (CRITICAL VALUE NOTIFICATION) - NAME OF MD/NP/PA/DO NOTIFIED:
MD Calderon
[facility]on [facility] 
MD Cohn
Dr. Weaver
LENKA Napier
Dr. Weaver
LENKA Wall
LENKA Wall
MD Chavez

## 2023-10-22 NOTE — PROVIDER CONTACT NOTE (CHANGE IN STATUS NOTIFICATION) - ACTION/TREATMENT ORDERED:
Provider made aware, stated patient had a prior episode where she was febrile, currently afebrile, provider encouraged incentive spirometer use, repeat diagnostics and stated she will review the chart with further interventions pending.

## 2023-10-22 NOTE — PROVIDER CONTACT NOTE (CRITICAL VALUE NOTIFICATION) - PERSON GIVING RESULT:
Ariela Le
[place] from lab
[facility] - [place]
[facility]
HECTOR Le
[facility]io [facility]
Marcia Richardson from lab
Saji espinoza
Drois Ashley

## 2023-10-22 NOTE — PROVIDER CONTACT NOTE (CRITICAL VALUE NOTIFICATION) - ACTION/TREATMENT ORDERED:
MD aware, ID following and pt already on appropriate IV abx.
MD aware. Continue current antibiotics. No further interventions ordered.
MD aware. Continue current antibiotics. No further interventions ordered.
PA aware. pt pending platelet transfusion.
Patient will get supplements

## 2023-10-22 NOTE — PROGRESS NOTE ADULT - SUBJECTIVE AND OBJECTIVE BOX
SURGERY DAILY PROGRESS NOTE:     Subjective:  Patient seen and examined this AM at bedside. No acute events overnight and patient resting comfortably. Tolerating diet. +Bowel function. Denies fever/chills, shortness of breath, chest pain. VS reviewed    Objective:    MEDICATIONS  (STANDING):  amLODIPine   Tablet 10 milliGRAM(s) Oral daily  budesonide 160 MICROgram(s)/formoterol 4.5 MICROgram(s) Inhaler 2 Puff(s) Inhalation two times a day  gabapentin 100 milliGRAM(s) Oral three times a day  influenza   Vaccine 0.5 milliLiter(s) IntraMuscular once  meropenem Injectable 1000 milliGRAM(s) IV Push every 8 hours  pancrelipase  (CREON 12,000 Lipase Units) 2 Capsule(s) Oral three times a day with meals  pantoprazole  Injectable 40 milliGRAM(s) IV Push daily  tiotropium 2.5 MICROgram(s) Inhaler 2 Puff(s) Inhalation daily    MEDICATIONS  (PRN):  acetaminophen   IVPB .. 650 milliGRAM(s) IV Intermittent every 6 hours PRN Temp greater or equal to 38C (100.4F), Mild Pain (1 - 3)  albuterol    90 MICROgram(s) HFA Inhaler 2 Puff(s) Inhalation every 6 hours PRN for bronchospasm  morphine  - Injectable 2 milliGRAM(s) IV Push every 4 hours PRN Severe Pain (7 - 10)  ondansetron Injectable 4 milliGRAM(s) IV Push every 6 hours PRN Nausea  pancrelipase  (CREON 12,000 Lipase Units) 1 Capsule(s) Oral <User Schedule> PRN with snacks, pancreatic insufficiency  senna 2 Tablet(s) Oral at bedtime PRN Constipation      Vital Signs Last 24 Hrs  T(C): 36.7 (21 Oct 2023 21:16), Max: 37.1 (21 Oct 2023 15:58)  T(F): 98.1 (21 Oct 2023 21:16), Max: 98.8 (21 Oct 2023 15:58)  HR: 87 (22 Oct 2023 04:00) (64 - 88)  BP: 148/97 (22 Oct 2023 04:00) (91/59 - 148/97)  BP(mean): 113 (22 Oct 2023 04:00) (61 - 113)  RR: 20 (22 Oct 2023 04:00) (12 - 22)  SpO2: 98% (22 Oct 2023 04:00) (92% - 100%)    Parameters below as of 21 Oct 2023 20:34  Patient On (Oxygen Delivery Method): room air        PHYSICAL EXAM   GENERAL: NAD, well developed, thin with temporal wasting  HEAD: Atraumatic, normocephalic  EYES: EOMI, PERRLA, conjunctiva and sclera clear  ENT: moist mucous membrane  NECK: supple, No JVD, midline trachea  CHEST/LUNG: No increased WOB, symmetric excursions  Heart: RRR ppp, no peripheral edema  ABDOMEN: Round, nondistended, soft, nontender. no organomegaly  EXTREMITIES: Brisk cap refill. no clubbing or cyanosis  NERVOUS SYSTEM: AOx4, speech clear, no neuro-deficits  MSK: full ROM, no deformities  SKIN: warm to touch, no rash or lesions      I&O's Detail    20 Oct 2023 07:01  -  21 Oct 2023 07:00  --------------------------------------------------------  IN:    dextrose 5% + sodium chloride 0.9% w/ Additives: 1725 mL    IV PiggyBack: 365 mL    Platelets - Single Donor: 210 mL  Total IN: 2300 mL    OUT:  Total OUT: 0 mL    Total NET: 2300 mL      21 Oct 2023 07:01  -  22 Oct 2023 04:04  --------------------------------------------------------  IN:    dextrose 5% + sodium chloride 0.9% w/ Additives: 842 mL  Total IN: 842 mL    OUT:    Voided (mL): 1000 mL  Total OUT: 1000 mL    Total NET: -158 mL          Daily     Daily     LABS:                        7.4    12.02 )-----------( 26       ( 21 Oct 2023 05:52 )             21.7     10-21    137  |  106  |  5<L>  ----------------------------<  128<H>  3.8   |  28  |  0.40<L>    Ca    7.1<L>      21 Oct 2023 05:52  Phos  2.3     10-21  Mg     1.7     10-21    TPro  4.2<L>  /  Alb  1.5<L>  /  TBili  1.4<H>  /  DBili  x   /  AST  16  /  ALT  14  /  AlkPhos  135<H>  10-21    PT/INR - ( 20 Oct 2023 05:26 )   PT: 12.4 sec;   INR: 1.10 ratio         PTT - ( 20 Oct 2023 05:26 )  PTT:29.3 sec  Urinalysis Basic - ( 21 Oct 2023 05:52 )    Color: x / Appearance: x / SG: x / pH: x  Gluc: 128 mg/dL / Ketone: x  / Bili: x / Urobili: x   Blood: x / Protein: x / Nitrite: x   Leuk Esterase: x / RBC: x / WBC x   Sq Epi: x / Non Sq Epi: x / Bacteria: x

## 2023-10-22 NOTE — PROVIDER CONTACT NOTE (OTHER) - ACTION/TREATMENT ORDERED:
Provider requested the AM labs be drawn at 0800 following the administration of electrolyte supplementation per orders, lab contacted and made aware, safety maintained
Provider aware, HR decreased to , patient feels SOB resolved, IVF and IV tylenol running, will cont to monitor

## 2023-10-22 NOTE — PROVIDER CONTACT NOTE (CHANGE IN STATUS NOTIFICATION) - RECOMMENDATIONS
Patient requested telephone orders for a stat EKG, stat CBC and stat blood cultures Provider requested telephone orders for a stat EKG, stat CBC and stat blood cultures

## 2023-10-22 NOTE — PROVIDER CONTACT NOTE (CHANGE IN STATUS NOTIFICATION) - BACKGROUND
62 year old female with pancreatic CA s/p whippchris bauman/Randa SWEET 9/21/23 [place] [facility] [place]peritoneal collection, on IV meropenum, +Bcx previously, +Ucx for ESBL

## 2023-10-22 NOTE — PROGRESS NOTE ADULT - SUBJECTIVE AND OBJECTIVE BOX
Patient is a 62y old  Female who presents with a chief complaint of Retroperitoneal fluid collection (22 Oct 2023 04:02)    Follow up for: RP fluid collection    Patient without acute complaints this AM. Tolerating breakfast.   Overnight had episode of fluid responsive hypotension and needed venti-mask, now ok.       MEDICATIONS  (STANDING):  amLODIPine   Tablet 10 milliGRAM(s) Oral daily  budesonide 160 MICROgram(s)/formoterol 4.5 MICROgram(s) Inhaler 2 Puff(s) Inhalation two times a day  gabapentin 100 milliGRAM(s) Oral three times a day  influenza   Vaccine 0.5 milliLiter(s) IntraMuscular once  meropenem Injectable 1000 milliGRAM(s) IV Push every 8 hours  pancrelipase  (CREON 12,000 Lipase Units) 2 Capsule(s) Oral three times a day with meals  pantoprazole  Injectable 40 milliGRAM(s) IV Push daily  tiotropium 2.5 MICROgram(s) Inhaler 2 Puff(s) Inhalation daily    MEDICATIONS  (PRN):  acetaminophen   IVPB .. 650 milliGRAM(s) IV Intermittent every 6 hours PRN Temp greater or equal to 38C (100.4F), Mild Pain (1 - 3)  albuterol    90 MICROgram(s) HFA Inhaler 2 Puff(s) Inhalation every 6 hours PRN for bronchospasm  morphine  - Injectable 2 milliGRAM(s) IV Push every 4 hours PRN Severe Pain (7 - 10)  ondansetron Injectable 4 milliGRAM(s) IV Push every 6 hours PRN Nausea  pancrelipase  (CREON 12,000 Lipase Units) 1 Capsule(s) Oral <User Schedule> PRN with snacks, pancreatic insufficiency  senna 2 Tablet(s) Oral at bedtime PRN Constipation      Vital Signs Last 24 Hrs  T(C): 36.6 (22 Oct 2023 09:10), Max: 37.1 (21 Oct 2023 15:58)  T(F): 97.9 (22 Oct 2023 09:10), Max: 98.8 (21 Oct 2023 15:58)  HR: 76 (22 Oct 2023 11:00) (66 - 132)  BP: 107/57 (22 Oct 2023 11:00) (91/59 - 190/122)  BP(mean): 72 (22 Oct 2023 11:00) (61 - 144)  RR: 18 (22 Oct 2023 11:00) (12 - 24)  SpO2: 93% (22 Oct 2023 11:00) (85% - 100%)    Parameters below as of 22 Oct 2023 11:00  Patient On (Oxygen Delivery Method): room air        PHYSICAL EXAM:    Constitutional: No acute distress, thin, pleasant  HEENT: unmasked, good phonation, not icteric  Neck: supple, no lymphadenopathy  Gastrointestinal: soft, non-tender, non-distended, +bowel sounds, no rebound or guarding,   Extremities: No peripheral edema, no cyanosis or clubbing  Vascular: 2+ peripheral pulses, no venous stasis  Neurological: A/O x 3, no focal deficits, no asterixis  Psychiatric: Normal mood, normal affect  Skin: No rashes, not jaundiced    LABS:                        7.6    10.88 )-----------( 28       ( 22 Oct 2023 08:38 )             23.3     10-22    137  |  105  |  7   ----------------------------<  112<H>  3.8   |  26  |  0.46<L>    Ca    7.5<L>      22 Oct 2023 08:38  Phos  3.0     10-22  Mg     2.0     10-22    TPro  4.2<L>  /  Alb  1.5<L>  /  TBili  1.4<H>  /  DBili  x   /  AST  16  /  ALT  14  /  AlkPhos  135<H>  10-21      LIVER FUNCTIONS - ( 21 Oct 2023 05:52 )  Alb: 1.5 g/dL / Pro: 4.2 gm/dL / ALK PHOS: 135 U/L / ALT: 14 U/L / AST: 16 U/L / GGT: x

## 2023-10-22 NOTE — CHART NOTE - NSCHARTNOTEFT_GEN_A_CORE
Clinical Nutrition BRIEF Note    * 63 y/o F transferred from [facility], pt with hx of pancreatic cancer, s/p 6 months of neoadjuvant FOLFIRINOX chemotherapy and pre-operative RT (completed about 2 months prior) followed by Whipple resection with Dr. Tolentino on 9/21/23. Her daughter called 911 because she has not been well for 2 weeks, in bed, not eating well. She had a lot of diarrhea post the surgery. She does admit to some weight loss in the last week, but is not able to quantify how much. CT scan of the chest abd pelvis shows emphysema in lungs/ without infiltrates. Abd portion showed small retropancreatic fluid collection and small fluid collection around subhepatic space.     *Current status: D/w surgical resident - plan to initiate 24 hr calorie count to determine if PN will be initiated in order to optimize nutrient needs. Per surgical resident, pt reports she is eating well however remains concerned since s/p Whipple procedure x 3 weeks ago and meets criteria for PCM. RD to follow up w/ results tomorrow.     Linda Metz, WILLIAM, CDN [phone]

## 2023-10-22 NOTE — PROVIDER CONTACT NOTE (CHANGE IN STATUS NOTIFICATION) - SITUATION
Patient stated she reached for a glass of water, became tachycardic, tremulous and desat'd down to 75-80%, placed on 50% venti mask, rectal temp 98.6, patient denies chest pain or discomfort, encouraged to breath deep, repositioned with the HOB elevated, provider contacted

## 2023-10-22 NOTE — PROVIDER CONTACT NOTE (CRITICAL VALUE NOTIFICATION) - TEST AND RESULT REPORTED:
plts
+Blood cultures, growth in anaerobic bottle gram negative rods
K 2.5
platelets 15,000
K 2.6
+BC growth in anaerobic bottle ESBL and E.Coli
PLT 1400
Blood cultures +
urine culture results

## 2023-10-22 NOTE — PROGRESS NOTE ADULT - SUBJECTIVE AND OBJECTIVE BOX
HPI:    63 y/o F with a PMHx of pancreatic cancer, s/p 6 months of neoadjuvant FOLFIRINOX chemotherapy and pre-operative RT completed 04/2023? followed by Whipple resection with SurgOnc Dr Tolentino on 09/21/23 now admitted to , transferred from [facility] [place], [place] for management of peripancreatic fluid collection and acute thrombocytopenia. According to family, she has not been well for 2 weeks, in bed, not eating well, had a lot of diarrhea post op. The patient admitted to some weight loss in the last week, but is not able to quantify how much. CT AP showed emphysema without infiltrates & small retro pancreatic fluid collection and small fluid collection around subhepatic space. Fever was noted at [facility] [place], [place].    10/20/2023: Seen at bedside, no acute distress, understanding of current medical situation     10/21/2023- Seen at bedside, OOB to chair, in no acute distress, reports tolerating PO well, no N/V    10/22/2023- Seen at bedside, OOB to bucky in no acute distress, per RN gets anxious and that leads to becoming hypoxic, requiring O2. Denies pain, no N/V, no abdominal discomfort, tolerating PO well.    PAST MEDICAL & SURGICAL HISTORY:    Malignant neoplasm of pancreas    Seasonal allergies    Asthma    S/p left hip fracture    History of infusaport central venous catheter insertion    S/P ERCP    H/O Whipple procedure        MEDICATIONS  (STANDING):    amLODIPine   Tablet 10 milliGRAM(s) Oral daily  budesonide 160 MICROgram(s)/formoterol 4.5 MICROgram(s) Inhaler 2 Puff(s) Inhalation two times a day  gabapentin 100 milliGRAM(s) Oral three times a day  influenza   Vaccine 0.5 milliLiter(s) IntraMuscular once  meropenem Injectable 1000 milliGRAM(s) IV Push every 8 hours  pancrelipase  (CREON 12,000 Lipase Units) 2 Capsule(s) Oral three times a day with meals  pantoprazole  Injectable 40 milliGRAM(s) IV Push daily  tiotropium 2.5 MICROgram(s) Inhaler 2 Puff(s) Inhalation daily    MEDICATIONS  (PRN):  acetaminophen   IVPB .. 650 milliGRAM(s) IV Intermittent every 6 hours PRN Temp greater or equal to 38C (100.4F), Mild Pain (1 - 3)  albuterol    90 MICROgram(s) HFA Inhaler 2 Puff(s) Inhalation every 6 hours PRN for bronchospasm  morphine  - Injectable 2 milliGRAM(s) IV Push every 4 hours PRN Severe Pain (7 - 10)  ondansetron Injectable 4 milliGRAM(s) IV Push every 6 hours PRN Nausea  pancrelipase  (CREON 12,000 Lipase Units) 1 Capsule(s) Oral <User Schedule> PRN with snacks, pancreatic insufficiency  senna 2 Tablet(s) Oral at bedtime PRN Constipation      ALLERGIES:    No Known Allergies      FAMILY HISTORY:    Nothing noted      REVIEW OF SYSTEMS:    Constitutional, Eyes, ENT, Cardiovascular, Respiratory, Gastrointestinal, Genitourinary, Musculoskeletal, Integumentary, Neurological, Psychiatric, Endocrine, Heme/Lymph and Allergic/Immunologic review of systems are otherwise negative except as noted in HPI.       VITALS SIGNS LAST 24 HRS:    Vital Signs Last 24 Hrs  T(C): 36.6 (22 Oct 2023 09:10), Max: 37.1 (21 Oct 2023 15:58)  T(F): 97.9 (22 Oct 2023 09:10), Max: 98.8 (21 Oct 2023 15:58)  HR: 78 (22 Oct 2023 12:22) (66 - 132)  BP: 99/61 (22 Oct 2023 12:22) (91/59 - 190/122)  BP(mean): 73 (22 Oct 2023 12:22) (61 - 144)  RR: 16 (22 Oct 2023 12:22) (12 - 24)  SpO2: 95% (22 Oct 2023 12:22) (85% - 100%)    Parameters below as of 22 Oct 2023 12:22  Patient On (Oxygen Delivery Method): room air      PHYSICAL EXAM:    Constitutional: no acute distress  Eyes: no conjunctival infection, anicteric.   ENT: pharynx is unremarkable  Neck: supple without JVD   Pulmonary: clear to auscultation bilaterally   Cardiac: RRR,   Vascular: no calf tenderness, venous stasis changes, varices  Abdomen: normoactive bowel sounds, soft and nontender, no hepatosplenomegaly or masses appreciated  Lymphatic: no peripheral adenopathy appreciated  Musculoskeletal: full range of motion and no deformities appreciated   Skin: normal appearance, no rash/erythema   Neurology: awake, alert, oriented ; no focal deficits       LABS:                          7.6    10.88 )-----------( 28       ( 22 Oct 2023 08:38 )             23.3     10-22    137  |  105  |  7   ----------------------------<  112<H>  3.8   |  26  |  0.46<L>    Ca    7.5<L>      22 Oct 2023 08:38  Phos  3.0     10-22  Mg     2.0     10-22    TPro  4.2<L>  /  Alb  1.5<L>  /  TBili  1.4<H>  /  DBili  x   /  AST  16  /  ALT  14  /  AlkPhos  135<H>  10-21        PT/INR - ( 20 Oct 2023 05:26 )   PT: 12.4 sec;   INR: 1.10 ratio         PTT - ( 20 Oct 2023 05:26 )  PTT:29.3 sec  Urinalysis Basic - ( 20 Oct 2023 08:27 )    Color: x / Appearance: x / SG: x / pH: x  Gluc: 133 mg/dL / Ketone: x  / Bili: x / Urobili: x   Blood: x / Protein: x / Nitrite: x   Leuk Esterase: x / RBC: x / WBC x   Sq Epi: x / Non Sq Epi: x / Bacteria: x        RADIOLOGY & ADDITIONAL STUDIES:    < from: CT Abdomen and Pelvis w/ Oral Cont (10.20.23 @ 12:14) >    ACC: [id number] EXAM:  CT ABDOMEN AND PELVIS OC   ORDERED BY: SUSAN RUSSELL     PROCEDURE DATE:  10/20/2023          INTERPRETATION:  CLINICAL INFORMATION: 62-year-old woman status post   Whipple with retropancreatic fluid collection.    COMPARISON: October 17, 2023    CONTRAST/COMPLICATIONS:  IV Contrast: NONE  Oral Contrast: Omnipaque 300  Complications: None reported at time of study completion    PROCEDURE:  CT of the Abdomen and Pelvis was performed.  Sagittal and coronal reformats were performed.    FINDINGS: Evaluation is limited due to lack of intravenous contrast.  LOWER CHEST: Coronary artery calcifications. New small bilateral pleural   effusions. Emphysematous changes.    LIVER: Within normal limits.  BILE DUCTS: Pneumobilia. No ductal dilatation.  GALLBLADDER: Cholecystectomy.  SPLEEN: Within normal limits.  PANCREAS: Status post Whipple procedure. Pancreatic remnant is   unremarkable within the limitations of this unenhanced CT scan.   Pancreatic duct stent in place. Retropancreatic fluid collection is   difficult to visualize, however appears at least mildly decreased in   size, measuring approximately 3.4 x 1.6 cm (2; 45), previously 4.0 x 1.5   cm.  ADRENALS: Within normal limits.  KIDNEYS/URETERS: Within normal limits.    BLADDER: Within normal limits.  REPRODUCTIVE ORGANS: Uterus and adnexa within normal limits.    BOWEL: No bowel obstruction. Appendix is normal.  PERITONEUM: Trace ascites.  VESSELS: Atherosclerotic changes.  RETROPERITONEUM/LYMPH NODES: No lymphadenopathy.  ABDOMINAL WALL: Postoperative changes in the ventral abdominal wall.  BONES: Within normal limits.    IMPRESSION:  Limited study due to lack of intravenous contrast.    Decrease in size of retropancreatic fluid collection.    New small bilateral pleural effusions.

## 2023-10-22 NOTE — PROVIDER CONTACT NOTE (OTHER) - REASON
This encounter was created in error - please disregard.
Provider regarding med/lab draws
Fever, tachycardia, dyspnea at rest

## 2023-10-23 NOTE — CHART NOTE - NSCHARTNOTEFT_GEN_A_CORE
Dietitian BRIEF Note 10/23    24-Hour calorie count NOT completed. D/w nursing, will fill out today. RD to f/u tomorrow after calorie count completed.    RD will continue to monitor PO intake, labs, hydration, and wt prn.  Luz Tang (Formerly Anuja), MS, RDN, [facility], [phone]  Certified Nutrition

## 2023-10-23 NOTE — PROGRESS NOTE ADULT - SUBJECTIVE AND OBJECTIVE BOX
Date of service: 10-23-23 @ 14:04    pt seen and examined  10/21 noted with resp distress fever o/n  doing better this am  temps down, no resp distress  no abd discomfort    ROS:  denies dizziness, no HA,  no diarrhea or constipation; no dysuria, no urinary frequency, no legs pain, no rashes    MEDICATIONS  (STANDING):  amLODIPine   Tablet 10 milliGRAM(s) Oral daily  budesonide 160 MICROgram(s)/formoterol 4.5 MICROgram(s) Inhaler 2 Puff(s) Inhalation two times a day  gabapentin 100 milliGRAM(s) Oral three times a day  influenza   Vaccine 0.5 milliLiter(s) IntraMuscular once  meropenem Injectable 1000 milliGRAM(s) IV Push every 8 hours  pancrelipase  (CREON 12,000 Lipase Units) 2 Capsule(s) Oral three times a day with meals  pantoprazole  Injectable 40 milliGRAM(s) IV Push daily  tiotropium 2.5 MICROgram(s) Inhaler 2 Puff(s) Inhalation daily    Vital Signs Last 24 Hrs  T(C): 36.3 (23 Oct 2023 09:15), Max: 37.1 (22 Oct 2023 20:00)  T(F): 97.3 (23 Oct 2023 09:15), Max: 98.8 (22 Oct 2023 20:00)  HR: 92 (23 Oct 2023 13:00) (58 - 92)  BP: 122/61 (23 Oct 2023 13:00) (94/58 - 157/66)  BP(mean): 79 (23 Oct 2023 13:00) (69 - 89)  RR: 19 (23 Oct 2023 13:00) (15 - 20)  SpO2: 95% (23 Oct 2023 13:00) (87% - 99%)    Parameters below as of 23 Oct 2023 09:00  Patient On (Oxygen Delivery Method): nasal cannula  O2 Flow (L/min): 2      PE:  Constitutional: NAD   HEENT: NC/AT, EOMI, PERRLA, conjunctivae clear; ears and nose atraumatic; pharynx benign  Neck: supple; thyroid not palpable  Back: no tenderness  Respiratory: respiratory effort normal; clear to auscultation  Cardiovascular: S1S2 regular, no murmurs  Abdomen: soft, tender, not distended, positive BS; liver and spleen WNL  Genitourinary: no suprapubic tenderness  Lymphatic: no LN palpable  Musculoskeletal: no muscle tenderness, no joint swelling or tenderness  Extremities: no pedal edema  Neurological/ Psychiatric: AxOx3, Judgement and insight normal;  moving all extremities  Skin: no rashes; no palpable lesions    Labs: all available labs reviewed                                   7.2    5.73  )-----------( 43       ( 23 Oct 2023 05:44 )             21.2     10-23    137  |  104  |  7   ----------------------------<  102<H>  3.6   |  26  |  0.33<L>    Ca    7.3<L>      23 Oct 2023 05:44  Phos  2.6     10-23  Mg     1.9     10-23      Urinalysis Basic - ( 20 Oct 2023 08:27 )  Culture - Blood (10.20.23 @ 10:54)   - Amikacin: S <=16  - Trimethoprim/Sulfamethoxazole: R >2/38  - Levofloxacin: S <=0.5  - Meropenem: S <=1  - Piperacillin/Tazobactam: R <=8  - Tobramycin: S <=2  Gram Stain:   Growth in anaerobic bottle: Gram Negative Rods  - Ampicillin: R >16 These ampicillin results predict results for amoxicillin  - Ampicillin/Sulbactam: R >16/8  - Aztreonam: R 16  - Cefazolin: R >16  - Cefepime: R 8  - Ceftriaxone: R >32  - Ciprofloxacin: S <=0.25  - Ertapenem: S <=0.5  - Gentamicin: S <=2  - Imipenem: S <=1  Specimen Source: .Blood None  Organism: Escherichia coli ESBL  Culture Results:   Growth in anaerobic bottle: Escherichia coli ESBL  Organism Identification: Escherichia coli ESBL    Method Type: MICCulture - Blood (10.22.23 @ 05:05)   Specimen Source: .Blood None  Culture Results:   No growth at 24 hours  Color: x / Appearance: x / SG: x / pH: x  Gluc: 133 mg/dL / Ketone: x  / Bili: x / Urobili: x   Blood: x / Protein: x / Nitrite: x   Leuk Esterase: x / RBC: x / WBC x   Sq Epi: x / Non Sq Epi: x / Bacteria: x        Radiology: all available radiological tests reviewed      ACC: [id number] EXAM:  XR CHEST PA LAT 2V                          PROCEDURE DATE:  10/08/2022          INTERPRETATION:  EXAM: PA and lateral chest.    COMPARISON: None.    CLINICAL INFORMATION: Shortness of breath.    FINDINGS:    The cardiac and mediastinal silhouettes are within normal limits.  The lungs are clear.  There are no pleural effusions.  There is an old, healed right-sided rib fracture.    IMPRESSION: No acute pulmonary disease        Advanced directives addressed: full resuscitation

## 2023-10-23 NOTE — PROGRESS NOTE ADULT - SUBJECTIVE AND OBJECTIVE BOX
SURGERY DAILY PROGRESS NOTE:     Subjective:  Patient seen and examined this AM at bedside. No acute events overnight and patient resting comfortably. Tolerating diet. +Bowel function. Denies fever/chills, shortness of breath, chest pain. VS reviewed    Objective:    Vital Signs Last 24 Hrs  T(C): 36.3 (23 Oct 2023 09:15), Max: 37.1 (22 Oct 2023 20:00)  T(F): 97.3 (23 Oct 2023 09:15), Max: 98.8 (22 Oct 2023 20:00)  HR: 80 (23 Oct 2023 11:00) (58 - 86)  BP: 104/56 (23 Oct 2023 11:00) (94/53 - 157/66)  BP(mean): 69 (23 Oct 2023 11:00) (67 - 89)  RR: 18 (23 Oct 2023 11:00) (15 - 20)  SpO2: 95% (23 Oct 2023 11:00) (87% - 99%)    Parameters below as of 23 Oct 2023 09:00  Patient On (Oxygen Delivery Method): nasal cannula  O2 Flow (L/min): 2                          7.2    5.73  )-----------( 43       ( 23 Oct 2023 05:44 )             21.2     10-23    137  |  104  |  7   ----------------------------<  102<H>  3.6   |  26  |  0.33<L>    Ca    7.3<L>      23 Oct 2023 05:44  Phos  2.6     10-23  Mg     1.9     10-23      I&O's Summary    22 Oct 2023 07:01  -  23 Oct 2023 07:00  --------------------------------------------------------  IN: 0 mL / OUT: 300 mL / NET: -300 mL        Culture - Blood (collected 22 Oct 2023 05:05)  Source: .Blood None  Preliminary Report (23 Oct 2023 11:02):    No growth at 24 hours      PHYSICAL EXAM   GENERAL: NAD, well developed, thin with temporal wasting  HEAD: Atraumatic, normocephalic  EYES: EOMI, PERRLA, conjunctiva and sclera clear  ENT: moist mucous membrane  NECK: supple, No JVD, midline trachea  CHEST/LUNG: No increased WOB, symmetric excursions  Heart: RRR ppp, no peripheral edema  ABDOMEN: Round, nondistended, soft, nontender. no organomegaly  EXTREMITIES: Brisk cap refill. no clubbing or cyanosis  NERVOUS SYSTEM: AOx4, speech clear, no neuro-deficits  MSK: full ROM, no deformities  SKIN: warm to touch, no rash or lesions

## 2023-10-24 NOTE — PROGRESS NOTE ADULT - SUBJECTIVE AND OBJECTIVE BOX
Date of service: 10-24-23 @ 12:28      pt seen and examined  passing gas, having bms  no abd discomfort    ROS: no fever or chills; denies dizziness, no HA, no SOB or cough, no abdominal pain, no diarrhea or constipation; no dysuria, no urinary frequency, no legs pain, no rashes    MEDICATIONS  (STANDING):  amLODIPine   Tablet 10 milliGRAM(s) Oral daily  budesonide 160 MICROgram(s)/formoterol 4.5 MICROgram(s) Inhaler 2 Puff(s) Inhalation two times a day  gabapentin 100 milliGRAM(s) Oral three times a day  influenza   Vaccine 0.5 milliLiter(s) IntraMuscular once  meropenem Injectable 1000 milliGRAM(s) IV Push every 8 hours  pancrelipase  (CREON 12,000 Lipase Units) 2 Capsule(s) Oral three times a day with meals  pantoprazole  Injectable 40 milliGRAM(s) IV Push daily  tiotropium 2.5 MICROgram(s) Inhaler 2 Puff(s) Inhalation daily    Vital Signs Last 24 Hrs  T(C): 36.4 (24 Oct 2023 16:45), Max: 36.8 (24 Oct 2023 05:56)  T(F): 97.6 (24 Oct 2023 16:45), Max: 98.3 (24 Oct 2023 05:56)  HR: 81 (24 Oct 2023 20:01) (72 - 92)  BP: 124/57 (24 Oct 2023 20:01) (108/59 - 145/65)  BP(mean): 78 (24 Oct 2023 20:01) (74 - 101)  RR: 17 (24 Oct 2023 20:01) (12 - 18)  SpO2: 95% (24 Oct 2023 20:01) (92% - 97%)    Parameters below as of 24 Oct 2023 20:01  Patient On (Oxygen Delivery Method): room air              PE:  Constitutional: NAD   HEENT: NC/AT, EOMI, PERRLA, conjunctivae clear; ears and nose atraumatic; pharynx benign  Neck: supple; thyroid not palpable  Back: no tenderness  Respiratory: respiratory effort normal; clear to auscultation  Cardiovascular: S1S2 regular, no murmurs  Abdomen: soft, tender, not distended, positive BS; liver and spleen WNL  Genitourinary: no suprapubic tenderness  Lymphatic: no LN palpable  Musculoskeletal: no muscle tenderness, no joint swelling or tenderness  Extremities: no pedal edema  Neurological/ Psychiatric: AxOx3, Judgement and insight normal;  moving all extremities  Skin: no rashes; no palpable lesions    Labs: all available labs reviewed                                               7.4    5.12  )-----------( 75       ( 24 Oct 2023 06:22 )             22.4     10-24    138  |  105  |  7   ----------------------------<  104<H>  3.4<L>   |  29  |  0.37<L>    Ca    7.6<L>      24 Oct 2023 06:22  Phos  2.4     10-24  Mg     2.1     10-24          Urinalysis Basic - ( 20 Oct 2023 08:27 )  Culture - Blood (10.20.23 @ 10:54)   - Amikacin: S <=16  - Trimethoprim/Sulfamethoxazole: R >2/38  - Levofloxacin: S <=0.5  - Meropenem: S <=1  - Piperacillin/Tazobactam: R <=8  - Tobramycin: S <=2  Gram Stain:   Growth in anaerobic bottle: Gram Negative Rods  - Ampicillin: R >16 These ampicillin results predict results for amoxicillin  - Ampicillin/Sulbactam: R >16/8  - Aztreonam: R 16  - Cefazolin: R >16  - Cefepime: R 8  - Ceftriaxone: R >32  - Ciprofloxacin: S <=0.25  - Ertapenem: S <=0.5  - Gentamicin: S <=2  - Imipenem: S <=1  Specimen Source: .Blood None  Organism: Escherichia coli ESBL  Culture Results:   Growth in anaerobic bottle: Escherichia coli ESBL  Organism Identification: Escherichia coli ESBL    Method Type: MICCulture - Blood (10.22.23 @ 05:05)   Specimen Source: .Blood None  Culture Results:   No growth at 24 hours  Color: x / Appearance: x / SG: x / pH: x  Gluc: 133 mg/dL / Ketone: x  / Bili: x / Urobili: x   Blood: x / Protein: x / Nitrite: x   Leuk Esterase: x / RBC: x / WBC x   Sq Epi: x / Non Sq Epi: x / Bacteria: x        Radiology: all available radiological tests reviewed      ACC: [id number] EXAM:  XR CHEST PA LAT 2V                          PROCEDURE DATE:  10/08/2022          INTERPRETATION:  EXAM: PA and lateral chest.    COMPARISON: None.    CLINICAL INFORMATION: Shortness of breath.    FINDINGS:    The cardiac and mediastinal silhouettes are within normal limits.  The lungs are clear.  There are no pleural effusions.  There is an old, healed right-sided rib fracture.    IMPRESSION: No acute pulmonary disease        Advanced directives addressed: full resuscitation

## 2023-10-24 NOTE — CHART NOTE - NSCHARTNOTEFT_GEN_A_CORE
24-hr Calorie Count Results    *63 y/o F transferred from [facility], pt with hx of pancreatic cancer, s/p 6 months of neoadjuvant FOLFIRINOX chemotherapy and pre-operative RT (completed about 2 months prior) followed by Whipple resection with Dr. Tolentino on 23. Her daughter called 911 because she has not been well for 2 weeks, in bed, not eating well. She had a lot of diarrhea post the surgery. She does admit to some weight loss in the last week, but is not able to quantify how much. CT scan of the chest abd pelvis shows emphysema in lungs/ without infiltrates. Abd portion showed small retropancreatic fluid collection and small fluid collection around subhepatic space.     *Current Status: pt w/ poor diet but calorie count reveals eating pretty well. Meeting protein needs. w/ chronic diarrhea - GI recommends serial CT scans     *Day #1 Results:    Breakfast: 90% 2 Homemade Buttermilk Pancakes, 90%  1 Blas Strip, 3 Margarine, 75% Premier Chocolate Protein Shake   = 364 Kcal, 30 g Protein  Lunch: 50% Chicken Noodle Soup, 45% Grilled Swiss Cheese on White Bread, 90% Lactaid Vanilla Ice Cream  = 269 Kcal, 9 g Protein  Dinner: 50% Braised Short Ribs of Beef, 50% Mashed Potatoes, 50% Lactaid Vanilla Ice Cream, 100% Premier Chocolate Protein Shake  = 512 Kcal, 43 g Protein    TOTAL for day 1 # reveals patient consumin Kcal, 82g Protein (74% Meets of Estimated Energy needs and 98% Protein needs)    *Estimated Needs: based on 47.6 Kg  Calories: 6834-8123 Kcal (30-35Kcal/Kg)  Protein: 71.4-95.2 g (1.5-2.0g/Kg)  Fluids: 1428- 1666 mL (30- 35 mL/Kg)    *Malnutrition dx: Pt meets criteria for severe protein-calorie malnutrition in context of chronic disease r/t decreased ability to meet increased nutrient needs 2/2 CA AEB severe muscle/fat wasting      Recommendations:   1) c/w current diet rx  2) c/w Premier protein shake BID to optimize nutritional needs (provides 160 kcal, 30 g protein/ shake) - better tolerated w/ chronic diarrhea  3) Can trial banatrol 3 packets daily 2/2 chronic diarrhea mixed w/ yogurt or applesauce  4) MVI w/ minerals daily to ensure 100% RDA met   5) Consider adding thiamine 100 mg daily 2/2 poor PO intake/ malnutrition   6) Consider adding appetite stimulant such as Remeron or Marinol 2/2 chronically poor appetite/ PO intake   7) Confirm goals of care regarding long-term nutrition support - continue to optimize PO; Encourage protein-rich foods, maximize food preferences     RD will continue to monitor PO intake, labs, hydration, and wt prn.  Luz Tang (Formerly Anuja), MS, RDN, CNSC, [phone]  Certified Nutrition

## 2023-10-24 NOTE — PROGRESS NOTE ADULT - SUBJECTIVE AND OBJECTIVE BOX
SURGERY DAILY PROGRESS NOTE:     Subjective:  Patient seen and examined this AM at bedside. No acute events overnight and patient resting comfortably. Tolerating diet. +Bowel function. Denies fever/chills, shortness of breath, chest pain. VS reviewed    Objective:    PHYSICAL EXAM   GENERAL: NAD, well developed, thin with temporal wasting  HEAD: Atraumatic, normocephalic  EYES: EOMI, PERRLA, conjunctiva and sclera clear  ENT: moist mucous membrane  NECK: supple, No JVD, midline trachea  CHEST/LUNG: No increased WOB, symmetric excursions  Heart: RRR ppp, no peripheral edema  ABDOMEN: Round, nondistended, soft, nontender. no organomegaly  EXTREMITIES: Brisk cap refill. no clubbing or cyanosis  NERVOUS SYSTEM: AOx4, speech clear, no neuro-deficits  MSK: full ROM, no deformities  SKIN: warm to touch, no rash or lesions    Vital Signs Last 24 Hrs  T(C): 36.6 (23 Oct 2023 14:34), Max: 36.8 (23 Oct 2023 06:31)  T(F): 97.8 (23 Oct 2023 14:34), Max: 98.2 (23 Oct 2023 06:31)  HR: 73 (24 Oct 2023 03:00) (60 - 92)  BP: 130/63 (24 Oct 2023 03:00) (104/56 - 157/66)  BP(mean): 81 (24 Oct 2023 03:00) (69 - 89)  RR: 15 (24 Oct 2023 03:00) (15 - 21)  SpO2: 92% (24 Oct 2023 03:00) (82% - 99%)    Parameters below as of 23 Oct 2023 20:35  Patient On (Oxygen Delivery Method): room air                            7.2    5.73  )-----------( 43       ( 23 Oct 2023 05:44 )             21.2     10-23    137  |  104  |  7   ----------------------------<  102<H>  3.6   |  26  |  0.33<L>    Ca    7.3<L>      23 Oct 2023 05:44  Phos  2.6     10-23  Mg     1.9     10-23      I&O's Summary    22 Oct 2023 07:01  -  23 Oct 2023 07:00  --------------------------------------------------------  IN: 0 mL / OUT: 300 mL / NET: -300 mL        Culture - Blood (collected 22 Oct 2023 05:05)  Source: .Blood None  Preliminary Report (23 Oct 2023 11:02):    No growth at 24 hours

## 2023-10-24 NOTE — ADVANCED PRACTICE NURSE CONSULT - ASSESSMENT
All risk and benefits explained and patient agree to procedure and verbal consent given by the patient.  Patient in bed in semi-fowlers position. Time out performed. Midline sterile insertion procedure performed by Taisha Barfield RN and assisted by Cindy Chavez RN.  RUE Basilic vein accessed using MST ultrasound, lidocaine subdermal administered as per hospital policy.  AVST with [employer] REF #  LOT [id number] Single Midline 4 Fr cut to 14 cm catheter and inserted with external length 0 cm.   All sharps and wires disposed of as per policy.  Minimal blood loss. Sterile dressing with biopatch and securement device applied.  Flushing without resistance with brisk blood return.  Patient tolerated well.  RN made aware.

## 2023-10-25 NOTE — PROGRESS NOTE ADULT - SUBJECTIVE AND OBJECTIVE BOX
SURGERY DAILY PROGRESS NOTE:     Subjective:  Patient seen and examined this AM at bedside. No acute events overnight and patient resting comfortably. PICC line was placed yesterday. Tolerating diet. +Bowel function. Denies fever/chills, shortness of breath, chest pain. VS reviewed    Objective:    PHYSICAL EXAM   GENERAL: NAD, well developed, thin with temporal wasting  HEAD: Atraumatic, normocephalic  EYES: EOMI, PERRLA, conjunctiva and sclera clear  ENT: moist mucous membrane  NECK: supple, No JVD, midline trachea  CHEST/LUNG: No increased WOB, symmetric excursions  Heart: RRR ppp, no peripheral edema  ABDOMEN: Round, nondistended, soft, nontender. no organomegaly  EXTREMITIES: Brisk cap refill. no clubbing or cyanosis  NERVOUS SYSTEM: AOx4, speech clear, no neuro-deficits  MSK: full ROM, no deformities. PICC line to the RUE.   SKIN: warm to touch, no rash or lesions    Vital Signs Last 24 Hrs  T(C): 36.7 (25 Oct 2023 00:17), Max: 36.8 (24 Oct 2023 05:56)  T(F): 98.1 (25 Oct 2023 00:17), Max: 98.3 (24 Oct 2023 05:56)  HR: 79 (25 Oct 2023 00:17) (72 - 87)  BP: 136/69 (25 Oct 2023 00:17) (108/59 - 145/65)  BP(mean): 78 (24 Oct 2023 20:01) (74 - 101)  RR: 17 (25 Oct 2023 00:17) (12 - 18)  SpO2: 95% (25 Oct 2023 00:17) (92% - 97%)    Parameters below as of 25 Oct 2023 00:17  Patient On (Oxygen Delivery Method): room air                            7.4    5.12  )-----------( 75       ( 24 Oct 2023 06:22 )             22.4     10-24    138  |  105  |  7   ----------------------------<  104<H>  3.4<L>   |  29  |  0.37<L>    Ca    7.6<L>      24 Oct 2023 06:22  Phos  2.4     10-24  Mg     2.1     10-24      I&O's Summary      Culture - INF Candida auris (collected 23 Oct 2023 14:05)  Source: Nares/Axilla/Groin Nares/Axilla/Groin  Preliminary Report (24 Oct 2023 19:27):    Culture in progress    Culture - Blood (collected 22 Oct 2023 05:05)  Source: .Blood None  Preliminary Report (24 Oct 2023 11:01):    No growth at 48 Hours

## 2023-10-25 NOTE — DISCHARGE NOTE PROVIDER - CARE PROVIDER_API CALL
[facility]entino [facility]  [address]  [place] [address]  Phone: [phone]  Fax: [phone]  Follow Up Time: Routine

## 2023-10-25 NOTE — DISCHARGE NOTE PROVIDER - NSDCMRMEDTOKEN_GEN_ALL_CORE_FT
Albuterol (Eqv-ProAir HFA) 90 mcg/inh inhalation aerosol: 2 puff(s) inhaled every 6 hours as needed for  bronchospasm  amLODIPine 10 mg oral tablet: 1 tab(s) orally once a day MDD: 1  Creon 12,000 units oral delayed release capsule: 2 cap(s) orally 3 times a day (with meals) MDD: 8-12  Creon 12,000 units oral delayed release capsule: 1 cap(s) orally prn snacks  gabapentin 100 mg oral capsule: 1 cap(s) orally 3 times a day as needed for pain MDD: 3  Lasix 20 mg oral tablet: 1 tab(s) orally once a day  ondansetron 8 mg oral tablet: 1 tab(s) orally 3 times a day as needed for  nausea  pantoprazole 40 mg oral delayed release tablet: 1 tab(s) orally once a day (before a meal) MDD: 1  potassium chloride 20 mEq oral tablet, extended release: 1 tab(s) orally once a day  prochlorperazine 10 mg oral tablet: 1 tab(s) orally every 8 hours as needed for  nausea  senna (sennosides) 8.6 mg oral tablet: 1 tab(s) orally 2 times a day as needed for  constipation   Albuterol (Eqv-ProAir HFA) 90 mcg/inh inhalation aerosol: 2 puff(s) inhaled every 6 hours as needed for  bronchospasm  amLODIPine 10 mg oral tablet: 1 tab(s) orally once a day MDD: 1  Creon 12,000 units oral delayed release capsule: 2 cap(s) orally 3 times a day (with meals) MDD: 8-12  Creon 12,000 units oral delayed release capsule: 1 cap(s) orally prn snacks  gabapentin 100 mg oral capsule: 1 cap(s) orally 3 times a day as needed for pain MDD: 3  Invanz 1 g injection: 1 gram(s) intravenously once a day  Lasix 20 mg oral tablet: 1 tab(s) orally once a day  ondansetron 8 mg oral tablet: 1 tab(s) orally 3 times a day as needed for  nausea  pantoprazole 40 mg oral delayed release tablet: 1 tab(s) orally once a day (before a meal) MDD: 1  potassium chloride 20 mEq oral tablet, extended release: 1 tab(s) orally once a day  prochlorperazine 10 mg oral tablet: 1 tab(s) orally every 8 hours as needed for  nausea  senna (sennosides) 8.6 mg oral tablet: 1 tab(s) orally 2 times a day as needed for  constipation

## 2023-10-25 NOTE — DISCHARGE NOTE PROVIDER - HOSPITAL COURSE
61 yo F with hx of pancreatic cancer, s/p 6 months of neoadjuvant FOLFIRINOX chemotherapy and pre-operative RT (completed about 2 months prior) followed by Whipple resection with Dr. Tolentino on 9/21/23. Pt presented with low appetite, chronic diarrhea, found to have small retropancreatic collection. Pt is doing well, tolerating diet, having bowel function. IV abx are set up for home. 63 yo F with hx of pancreatic cancer, s/p 6 months of neoadjuvant FOLFIRINOX chemotherapy and pre-operative RT (completed about 2 months prior) followed by Whipple resection with Dr. Tolentino on 9/21/23. Pt presented to [facility] with low appetite, chronic diarrhea, found to have small retropancreatic collection. She developed fever and leukocytosis and was transfered to [facility] for possible drainage of intra-abdominal fluid collection. Fortunately she responded to IV antibiotics. She was anemic on admission and there was concern for a late postop bleed but her CT Angio was negative and she remained hemodynamically stable as did her Hb.  She is doing well, tolerating a regular diet with protein supplements and having bowel function. A PIC Line was placed for IV abx to be continued at home. She will se Dr Tolenitno in follow up as an outpatient.

## 2023-10-25 NOTE — DISCHARGE NOTE NURSING/CASE MANAGEMENT/SOCIAL WORK - PATIENT PORTAL LINK FT
You can access the FollowMyHealth Patient Portal offered by [facility] by registering at the following website: [email or web address]. By joining [facility]’s FollowMyHealth portal, you will also be able to view your health information using other applications (apps) compatible with our system.

## 2023-10-25 NOTE — DISCHARGE NOTE PROVIDER - NSDCCPCAREPLAN_GEN_ALL_CORE_FT
PRINCIPAL DISCHARGE DIAGNOSIS  Diagnosis: Intraabdominal fluid collection  Assessment and Plan of Treatment:

## 2023-10-25 NOTE — PROGRESS NOTE ADULT - PROVIDER SPECIALTY LIST ADULT
SICU
Surgery
Surgery
Infectious Disease
Surgery
Heme/Onc
Infectious Disease
Infectious Disease
Surgery
Gastroenterology
Heme/Onc
Surgery

## 2023-10-25 NOTE — PROGRESS NOTE ADULT - REASON FOR ADMISSION
[facility] Transfer for Peripancreatic Fluid Collection S/p Samir 09/2023
RP fluid collection
Retroperitoneal fluid collection
[facility] Transfer for Peripancreatic Fluid Collection S/p Samir 09/2023
Retroperitoneal fluid collection

## 2023-10-25 NOTE — PROGRESS NOTE ADULT - ASSESSMENT
62 year old F transfered from [facility], pt with hx of pancreatic cancer, s/p 6 months of neoadjuvant FOLFIRINOX chemotherapy and pre-operative RT (completed about 2 months prior) followed by Whipple resection with Dr. Tolentino on 9/21/23. Her daughter called 911 because she has not been well for 2 weeks, in bed, not eating well. She had a lot of diarrhea post the surgery. She denies any fevers, chills, nausea, diarrhea vomiting. She does admit to some weight loss in the last week, but is not able to quantify how much. She denies fever. Denies sick contacts. has no cough. denies urinary symptoms. CT scan of the chest abd pelvis shows emphysema in lungs/ without infiltrates. Abd portion showed small retropancreatic fluid collection and small fluid collection around subhepatic space.  Fever was noted at [facility]. Noted with positive blood cx enterobacterales urine cx also growing gnrs, was given IV zosyn/flagyl.    1. Sepsis with Enterobacterales/ESBL Ecoli. Abd fluid collection. UTI. Pancreatic cancer s/p whipple resection, chemotherapy, xrt. Immunocompromised host  - blood cx from 10/19 growing Enterobacterales, urine cx growing GNRs - has growing ESBL Ecoli in past   - s/p zosyn, flagyl  - on IV meropenem 1gmq8h #6   - continue with antibiotic coverage  - surgical, gi f/u noted, repeat CT abd/pelvis reviewed from 10/20 decreasing abd collection size  - blood cx 10/20 with ESBL Ecoli, bile cx with same org, outpt blood cx 10/19 with Enterobacterales  - repeat blood cx 10/22 no growth  - on dc will require midline and IV invanz 1gm daily until 11/4/23   - fu cbc  - tolerating abx well so far; no side effects noted  - reason for abx use and side effects reviewed with patient  - supportive care  - contact precautions    2. other issues - care per medicine         
62 year old F transfered from [facility], pt with hx of pancreatic cancer, s/p 6 months of neoadjuvant FOLFIRINOX chemotherapy and pre-operative RT (completed about 2 months prior) followed by Whipple resection with Dr. Tolentino on 9/21/23. Her daughter called 911 because she has not been well for 2 weeks, in bed, not eating well. She had a lot of diarrhea post the surgery. She denies any fevers, chills, nausea, diarrhea vomiting. She does admit to some weight loss in the last week, but is not able to quantify how much. She denies fever. Denies sick contacts. has no cough. denies urinary symptoms. CT scan of the chest abd pelvis shows emphysema in lungs/ without infiltrates. Abd portion showed small retropancreatic fluid collection and small fluid collection around subhepatic space.  Fever was noted at [facility]. Noted with positive blood cx enterobacterales urine cx also growing gnrs, was given IV zosyn/flagyl.    1. Sepsis with Enterobacterales/ESBL Ecoli. Abd fluid collection. UTI. Pancreatic cancer s/p whipple resection, chemotherapy, xrt. Immunocompromised host  - blood cx from 10/19 growing Enterobacterales, urine cx growing GNRs - has growing ESBL Ecoli in past   - s/p zosyn, flagyl  - on IV meropenem 1gmq8h #4   - continue with antibiotic coverage  - surgical, gi f/u noted, repeat CT abd/pelvis reviewed from 10/20 decreasing abd collection size  - blood cx 10/20 with ESBL Ecoli, bile cx with same org, outpt blood cx 10/19 with Enterobacterales  - repeat blood cx 10/22 no growth  - on dc will require midline and IV invanz 1gm daily x 2-3 week course  - fu cbc  - tolerating abx well so far; no side effects noted  - reason for abx use and side effects reviewed with patient  - supportive care  - contact precautions    2. other issues - care per medicine         
62 year old F transfered from [facility], pt with hx of pancreatic cancer, s/p 6 months of neoadjuvant FOLFIRINOX chemotherapy and pre-operative RT (completed about 2 months prior) followed by Whipple resection with Dr. Tolentino on 9/21/23.  PT with low appetite, chronic diarrhea, found to have small retropancreatic collection    Overnight Patient febrile with a rectal temp of 100.9, , O2 98% on 50% venti mask after desating at rest to low 80% on 3L NC and then 15% venti mask, Pt given tylenol and 1 L bolus responded well HR improved to 80s  and pt was weaned off to NC 3L sat >92%  blood and urine cx positive, ID on board meropenem started    Plan:    Pain control  IVF  Home meds  DVT ppx  held   Hem/onc on board:  Transfuse Plts at this time, continue if <10- 15 K or bleeding  - Transfuse blood as necessary, if hgb continues to drop or patient becomes hemodynamically unstable  PT assessment  Diet reg low fat  GI consult- serial CT scan  ID on board    Case discussed with Dr. Tolentino
63 y/o F with a PMhx of pancreatic CA, s/p FOLFIRINOX x6 months with RT & more recently Whipple resection 09/2023 with SurgOn Dr Tolentino, now admitted with retro pancreatic and sub hepatic fluid collection, currently with acute thrombocytopenia and worsening anemia.      # Pancreatic Adenocarcinoma S/p Chemo , RT & Surgery    - Primary Heme/Onc Dr Ryan Candelaria     - Jaundice presentation 09/2022, Total bilirubin 17, Abd US with pancreatic lesion, transferred to [facility]  - CT imaging revealed an infiltrative mass involving the inferior pancreatic head and third portion of the duodenum, concerning for pancreatic adenocarcinoma. Tumor encased (360 degrees) the superior mesenteric artery. There was also distal CBD obstruction by tumor.  - Pathology revealed adenocarcinoma, proficient mismatch repair ; Morphologically, the tissue resembled that of intestinal origin, with focal presence of goblet cells. Felt to be an intestinal subtype of pancreatic ductal adenocarcinoma. Disease determined unresectable at that time.  - S/p FOLFIRINOX 10/25/22 - 04/25/23  - CT CAP 01/2023 reveals regression of disease, no longer encasing SMA, <180 deg abutment of SMV  - CT CAP 06/06/23 revealed pancreatic tumor is no longer visible as discrete mass. No CT evidence of metastatic disease in the chest, abdomen and pelvis.  S/p brief course of RT to pancreatic tumor bed.   - S/p Whipple resection with SurgOn Dr Tolentino 09/21/2023. Path ypt1c, ypN1 ( 1/15)       # Acute Normocytic Anemia & Thrombocytopenia     - Hgb down to 7.6 g/dL ---> baseline 10.0 - 11.0 g/dL, most recently 10/02  - Plts acutely dropped from 400K+ to <50K between 10/02 and 10/17  - Does have hx of GIB s/p initial ERCP ----> FOBT pending  - Multifactorial in setting of known pancreatic malignancy, s/p chemo, RT & surgery, however patient clinically consistent with infectious/inflammatory process likely contributing to consumption  - As per CC PA, patient received Plt transfusion at [facility] prior to transfer    - Transfuse Plts at this time, continue if <10- 15 K or bleeding  - Transfuse blood as necessary, if hgb continues to drop or patient becomes hemodynamically unstable  - Continue supportive measures       # Peripancreatic & Subhepatic Fluid Collection S/p Whipple    - Known pancreatic adenocarcinoma, s/p chemo, RT 10/2022 - 04/2023  - S/p Whipple resection 09/21/2023  - Poor PO intake, weight loss as per family  - Currently afebrile, but did have fever at [facility]  - WBC count was up to 25K as of 10/19, currently improved  - ID consulted; noted positive blood cultures   - CT imaging at [facility] had revealed intra abdominal fluid collection, repeat CT AP-10/20/23-->Decrease in size of retropancreatic fluid collection  - Surg/ Onc following--Serial CTs   - Continue necessary supportive measures 
63 yo F with hx of pancreatic cancer, s/p 6 months of neoadjuvant FOLFIRINOX chemotherapy and pre-operative RT (completed about 2 months prior) followed by Whipple resection with Dr. Tolentino on 9/21/23.  PT with low appetite, chronic diarrhea, found to have small retropancreatic collection. She was last febrile 10/20 and is tolerated solids well.    Overnight Patient febrile with a rectal temp of 100.9, , O2 98% on 50% venti mask after desating at rest to low 80% on 3L NC and then 15% venti mask, Pt given tylenol and 1 L bolus responded well HR improved to 80s  and pt was weaned off to NC 3L sat >92%  blood and urine cx positive, ID on board meropenem started    Plan:    F/u blood & urine Cx  C/w meropenem as per ID  Pain control  Low fat diet  GI recommends serial CT scans  Home meds  VTE ppx w/ SCDs while awaiting recovering of thrombocytopenia   [facility] of [facility]    Case discussed with Dr. Montenegro
61 y/o F with a PMhx of pancreatic CA, s/p FOLFIRINOX x6 months with RT & more recently Whipple resection 09/2023 with SurgOn Dr Tolentino, now admitted with retro pancreatic and sub hepatic fluid collection, currently with acute thrombocytopenia and worsening anemia.      # Pancreatic Adenocarcinoma S/p Chemo , RT & Surgery    - Primary Heme/Onc Dr Ryan Candelaria     - Jaundice presentation 09/2022, Total bilirubin 17, Abd US with pancreatic lesion, transferred to [facility]  - CT imaging revealed an infiltrative mass involving the inferior pancreatic head and third portion of the duodenum, concerning for pancreatic adenocarcinoma. Tumor encased (360 degrees) the superior mesenteric artery. There was also distal CBD obstruction by tumor.  - Pathology revealed adenocarcinoma, proficient mismatch repair ; Morphologically, the tissue resembled that of intestinal origin, with focal presence of goblet cells. Felt to be an intestinal subtype of pancreatic ductal adenocarcinoma. Disease determined unresectable at that time.  - S/p FOLFIRINOX 10/25/22 - 04/25/23  - CT CAP 01/2023 reveals regression of disease, no longer encasing SMA, <180 deg abutment of SMV  - CT CAP 06/06/23 revealed pancreatic tumor is no longer visible as discrete mass. No CT evidence of metastatic disease in the chest, abdomen and pelvis.  S/p brief course of RT to pancreatic tumor bed.   - S/p Whipple resection with SurgOn Dr Tolentino 09/21/2023. Path ypt1c, ypN1 ( 1/15)         # Acute Normocytic Anemia & Thrombocytopenia     - Hgb down to 8.1 g/dL ---> baseline 10.0 - 11.0 g/dL, most recently 10/02  - Plts acutely dropped from 400K+ to <50K between 10/02 and 10/17  - Does have hx of GIB s/p initial ERCP ----> FOBT pending  - Multifactorial in setting of known pancreatic malignancy, s/p chemo, RT & surgery, however patient clinically consistent with infectious/inflammatory process likely contributing to consumption  - As per CC PA, patient received Plt transfusion at [facility] prior to transfer    - Transfuse Plts at this time, continue if <10- 15 K or bleeding  - Transfuse blood as necessary, if hgb continues to drop or patient becomes hemodynamically unstable  - Continue supportive measures       # Peripancreatic & Subhepatic Fluid Collection S/p Whipple    - Known pancreatic adenocarcinoma, s/p chemo, RT 10/2022 - 04/2023  - S/p Whipple resection 09/21/2023  - Poor PO intake, weight loss as per family  - Currently afebrile, but did have fever at [facility]  - WBC count was up to 25K as of 10/19, currently improved  - ID consulted; noted positive blood cultures   - CT imaging at [facility] had revealed intra abdominal fluid collection, repeat CT AP-10/20/23-->Decrease in size of retropancreatic fluid collection  - Surg/ Onc following--Serial CTs   - Continue necessary supportive measures 
62 year old F transfered from [facility], pt with hx of pancreatic cancer, s/p 6 months of neoadjuvant FOLFIRINOX chemotherapy and pre-operative RT (completed about 2 months prior) followed by Whipple resection with Dr. Tolentino on 9/21/23. Her daughter called 911 because she has not been well for 2 weeks, in bed, not eating well. She had a lot of diarrhea post the surgery. She denies any fevers, chills, nausea, diarrhea vomiting. She does admit to some weight loss in the last week, but is not able to quantify how much. She denies fever. Denies sick contacts. has no cough. denies urinary symptoms. CT scan of the chest abd pelvis shows emphysema in lungs/ without infiltrates. Abd portion showed small retropancreatic fluid collection and small fluid collection around subhepatic space.  Fever was noted at [facility]. Noted with positive blood cx enterobacterales urine cx also growing gnrs, was given IV zosyn/flagyl.    1. Sepsis with Enterobacterales/ESBL Ecoli. Abd fluid collection. UTI. Pancreatic cancer s/p whipple resection, chemotherapy, xrt. Immunocompromised host  - blood cx from 10/19 growing Enterobacterales, urine cx growing GNRs - has growing ESBL Ecoli in past   - s/p zosyn, flagyl  - on IV meropenem 1gmq8h #5   - continue with antibiotic coverage  - surgical, gi f/u noted, repeat CT abd/pelvis reviewed from 10/20 decreasing abd collection size  - blood cx 10/20 with ESBL Ecoli, bile cx with same org, outpt blood cx 10/19 with Enterobacterales  - repeat blood cx 10/22 no growth  - on dc will require midline and IV invanz 1gm daily x 2-3 week course  - fu cbc  - tolerating abx well so far; no side effects noted  - reason for abx use and side effects reviewed with patient  - supportive care  - contact precautions    2. other issues - care per medicine         
62 year old woman with pancreatic cancer s/p neoadjuvant and whipple, admitted with RP fluid collection.     Tolerating diet and overall improving although overnight events somewhat concerning.   Would pan culture.   Would consider re-scanning patient even though last CT from two days ago.   Will d/w surgery. 
63 yo F with hx of pancreatic cancer, s/p 6 months of neoadjuvant FOLFIRINOX chemotherapy and pre-operative RT (completed about 2 months prior) followed by Whipple resection with Dr. Tolentino on 9/21/23.  PT with low appetite, chronic diarrhea, found to have small retropancreatic collection. She was last febrile 10/20 and is tolerated solids well.    Blood and urine cx positive, repeat blood cultures without growth. Patient will require a midline and IV invanz 1gm daily x 2-3 week course on discharge.    Plan:    F/u final blood & urine Cx  C/w meropenem as per ID  Pain control  Low fat diet  GI recommends serial CT scans  Home meds  VTE ppx w/ SCDs while awaiting recovering of thrombocytopenia   May need transfusions of platelets if less than 10-15, per HemeOnc    Case discussed with Dr. Tolentino
63 yo F with hx of pancreatic cancer, s/p 6 months of neoadjuvant FOLFIRINOX chemotherapy and pre-operative RT (completed about 2 months prior) followed by Whipple resection with Dr. Tolentino on 9/21/23.  PT with low appetite, chronic diarrhea, found to have small retropancreatic collection. She was last febrile 10/20 and is tolerated solids well.    Overnight Patient febrile with a rectal temp of 100.9, , O2 98% on 50% venti mask after desating at rest to low 80% on 3L NC and then 15% venti mask, Pt given tylenol and 1 L bolus responded well HR improved to 80s  and pt was weaned off to NC 3L sat >92%  blood and urine cx positive, ID on board meropenem started    Plan:    F/u blood & urine Cx  C/w meropenem as per ID  Pain control  Low fat diet  GI recommends serial CT scans  Home meds  VTE ppx w/ SCDs while awaiting recovering of thrombocytopenia   May need [facility] of platelets [facility]    Case discussed with Dr. Montenegro
63 yo F with hx of pancreatic cancer, s/p 6 months of neoadjuvant FOLFIRINOX chemotherapy and pre-operative RT (completed about 2 months prior) followed by Whipple resection with Dr. Tolentino on 9/21/23.  PT with low appetite, chronic diarrhea, found to have small retropancreatic collection. She was last febrile 10/20 and is tolerated solids well.    Blood and urine cx positive, repeat blood cultures without growth. Patient received midline for IV invanz 1gm daily x 2-3 week course on discharge.    Thrombocytopenia is improving; yesterday platelets 75 from 43.     Plan:  F/u final blood Cx  C/w meropenem as per ID  Pain control  Low fat diet  Home meds  VTE ppx w/ SCDs  Discharge planning    Case discussed with Dr. Tolentino
ASSESSMENT  63yo female with PMHx pancreatic cancer, s/p 6 months of neoadjuvant FOLFIRINOX chemotherapy and pre-operative RT (completed about 2 months prior) followed by Whipple resection with Dr. Tolentino on 9/21/23. transferred from [facility] for possible drainage of small retropancreatic collection found on CT imaging  [place] [facility] had WBC 9.7k platelet count 32 (on 10/7/23 platelet count was 433). Hgb 9.7.   Of note As per [facility] [place], there was a RRT for hypoxic and tachycardia round to be in rapid Aflutter- resolved with medical mgmt, WBC increased 25k. pt stated she has a platelet transfusion at [place]  TTE 10/19 revealing EF 35-40%, severe MR.     Admitted for   1. Sepsis 2/2 small retropancreatic collection  2. GNR Bacteremia - urinary vs intra abd source  3. Normocytic Anemia   4. Severe thrombocytopenia  5. Hypokalemia    PLAN  Neuro: AAOx 3. pt reports feeling overwhelmed. CTH from [place] revealing chronic L frontal, R inferior cerebellar infarcts. pain control prn.   CV: Normotensive. cont home amlodipine. TTE 10/19 EF 35-45%, severe MR. check follow up echo here.   Pulm: on 2L NC , CTA chest neg for PE, revealing emphysema. cont spiriva and symbicort for COPD.   GI: NPO for now. PPI.   Nephro: Cr 0.35. replete K IV, will add another 3 runs of IV. monitor I & Os. Trend renal fxn and electrolytes  ID: changed to IV meropenem. blood cx from [facility] growing GNR. pt with hx ESBL e. coli in bile cx from 9/2023. will check blood and urine cx here. IR consulted for possible drainage of abd collection  Heme: new severe thrombocytopenia - unclear clear etiology suspect in setting of sepsis vs blood loss? however no clear source of bleeding at this time. will check ferritin, fibrinogen, d -dimer to r/o DIC. check occult blood. heme/onc consult - Dr. Guadalupe. no AC at this time. SCDs.   PT eval    Dispo: SICU. IV meropenem. blood and urine cx. trend plts. f/u heme recs and surg onc recs.     Will discuss with Dr. Cohn

## 2023-10-25 NOTE — DISCHARGE NOTE PROVIDER - NSDCFUSCHEDAPPT_GEN_ALL_CORE_FT
Chandler Tolentino  [facility] Physician [facility]  SURGONC [address]  Scheduled Appointment: 10/30/2023    Ryan Candelaria  [facility] Physician [facility]  Scheduled Appointment: 11/13/2023    [facility]  Scheduled Appointment: 11/13/2023

## 2023-10-25 NOTE — DISCHARGE NOTE PROVIDER - DETAILS OF MALNUTRITION DIAGNOSIS/DIAGNOSES
This patient has been assessed with a concern for Malnutrition and was treated during this hospitalization for the following Nutrition diagnosis/diagnoses:     -  10/21/2023: Severe protein-calorie malnutrition   -  10/21/2023: Underweight (BMI < 19)

## 2023-10-25 NOTE — PROGRESS NOTE ADULT - SUBJECTIVE AND OBJECTIVE BOX
Date of service: 10-25-23 @ 13:00    pt seen and examined  no abd discomfort  feels well    ROS: no fever or chills; denies dizziness, no HA, no SOB or cough, no abdominal pain, no diarrhea or constipation; no dysuria, no urinary frequency, no legs pain, no rashes      MEDICATIONS  (STANDING):  amLODIPine   Tablet 10 milliGRAM(s) Oral daily  budesonide 160 MICROgram(s)/formoterol 4.5 MICROgram(s) Inhaler 2 Puff(s) Inhalation two times a day  ertapenem  IVPB 1000 milliGRAM(s) IV Intermittent once  gabapentin 100 milliGRAM(s) Oral three times a day  influenza   Vaccine 0.5 milliLiter(s) IntraMuscular once  multivitamin/minerals 1 Tablet(s) Oral daily  pancrelipase  (CREON 12,000 Lipase Units) 2 Capsule(s) Oral three times a day with meals  pantoprazole  Injectable 40 milliGRAM(s) IV Push daily  thiamine 100 milliGRAM(s) Oral daily  tiotropium 2.5 MICROgram(s) Inhaler 2 Puff(s) Inhalation daily      Vital Signs Last 24 Hrs  T(C): 36.7 (25 Oct 2023 09:33), Max: 36.8 (24 Oct 2023 20:01)  T(F): 98 (25 Oct 2023 09:33), Max: 98.3 (24 Oct 2023 20:01)  HR: 72 (25 Oct 2023 10:53) (72 - 82)  BP: 112/69 (25 Oct 2023 10:53) (108/59 - 136/69)  BP(mean): 82 (25 Oct 2023 10:53) (74 - 95)  RR: 16 (25 Oct 2023 10:53) (12 - 19)  SpO2: 96% (25 Oct 2023 10:53) (94% - 96%)    Parameters below as of 25 Oct 2023 00:17  Patient On (Oxygen Delivery Method): room air      PE:  Constitutional: NAD   HEENT: NC/AT, EOMI, PERRLA, conjunctivae clear; ears and nose atraumatic; pharynx benign  Neck: supple; thyroid not palpable  Back: no tenderness  Respiratory: respiratory effort normal; clear to auscultation  Cardiovascular: S1S2 regular, no murmurs  Abdomen: soft, tender, not distended, positive BS; liver and spleen WNL  Genitourinary: no suprapubic tenderness  Lymphatic: no LN palpable  Musculoskeletal: no muscle tenderness, no joint swelling or tenderness  Extremities: no pedal edema  Neurological/ Psychiatric: AxOx3, Judgement and insight normal;  moving all extremities  Skin: no rashes; no palpable lesions    Labs: all available labs reviewed                                   7.2    5.29  )-----------( 119      ( 25 Oct 2023 05:39 )             22.0     10-25    135  |  104  |  5<L>  ----------------------------<  100<H>  4.0   |  27  |  0.30<L>    Ca    7.3<L>      25 Oct 2023 05:39  Phos  3.1     10-25  Mg     2.0     10-25          Urinalysis Basic - ( 20 Oct 2023 08:27 )  Culture - Blood (10.20.23 @ 10:54)   - Amikacin: S <=16  - Trimethoprim/Sulfamethoxazole: R >2/38  - Levofloxacin: S <=0.5  - Meropenem: S <=1  - Piperacillin/Tazobactam: R <=8  - Tobramycin: S <=2  Gram Stain:   Growth in anaerobic bottle: Gram Negative Rods  - Ampicillin: R >16 These ampicillin results predict results for amoxicillin  - Ampicillin/Sulbactam: R >16/8  - Aztreonam: R 16  - Cefazolin: R >16  - Cefepime: R 8  - Ceftriaxone: R >32  - Ciprofloxacin: S <=0.25  - Ertapenem: S <=0.5  - Gentamicin: S <=2  - Imipenem: S <=1  Specimen Source: .Blood None  Organism: Escherichia coli ESBL  Culture Results:   Growth in anaerobic bottle: Escherichia coli ESBL  Organism Identification: Escherichia coli ESBL    Method Type: MICCulture - Blood (10.22.23 @ 05:05)   Specimen Source: .Blood None  Culture Results:   No growth at 24 hours  Color: x / Appearance: x / SG: x / pH: x  Gluc: 133 mg/dL / Ketone: x  / Bili: x / Urobili: x   Blood: x / Protein: x / Nitrite: x   Leuk Esterase: x / RBC: x / WBC x   Sq Epi: x / Non Sq Epi: x / Bacteria: x        Radiology: all available radiological tests reviewed      ACC: [id number] EXAM:  XR CHEST PA LAT 2V                          PROCEDURE DATE:  10/08/2022          INTERPRETATION:  EXAM: PA and lateral chest.    COMPARISON: None.    CLINICAL INFORMATION: Shortness of breath.    FINDINGS:    The cardiac and mediastinal silhouettes are within normal limits.  The lungs are clear.  There are no pleural effusions.  There is an old, healed right-sided rib fracture.    IMPRESSION: No acute pulmonary disease        Advanced directives addressed: full resuscitation

## 2023-10-26 PROBLEM — I10 HTN (HYPERTENSION): Status: ACTIVE | Noted: 2023-01-01

## 2023-10-26 NOTE — CDI QUERY NOTE - NSCDIOTHERTXTBX_GEN_ALL_CORE_HH
There is conflicting documentation regarding the patient's nutrition status.  The patient received a nutrition assessment by a Registered Dietician on 10/21.  The documentation of this assessment states: severe protein calorie malnutrition  Chart documentation also states the patient is well developed.     Can the nutrition diagnosis and criteria be clarified, after study?    -Patient was not well-developed and was found to have severe protein-calorie malnutrition  (please also refer to the Dietician Assessment for further details)  -Other malnutrition severity  -Other (please specify)    Chart Documentation:    BMI 18  Supplements were ordered ENSURE PLUS ON 10/20    -Physical exam General: AAOx3, Well developed, NAD Lacie Calderon)  (Signed 20-Oct-2023 05:32)Chandler Tolentino)  (Signed 20-Oct-2023 13:52)Lacie Calderon)  (Signed 20-Oct-2023 05:32)  -GENERAL: NAD, well developed, thin with temporal wasting Adi Montenegro)   (Signed 23-Oct-2023 12:23)Dionne Hyatt)   (Signed 22-Oct-2023 04:12)  -GENERAL: NAD, well developed, thin with temporal wasting Adi Montenegro)   (Signed 23-Oct-2023 12:23)Karlee Hu)   (Signed 23-Oct-2023 11:56)  -GENERAL: NAD, well developed, thin with temporal wasting-PT with low appetite, chronic diarrhea, found to have small retropancreatic collection. She was last febrile 10/20 and is tolerated solids well.Parvin Weaver)   (Signed 24-Oct-2023 04:06)Chandler Tolentino)   (Signed 25-Oct-2023 16:37)  -GENERAL: NAD, well developed, thin with temporal wasting Parvin Weaver)   (Signed 25-Oct-2023 01:05)Chandler Tolentino)   (Signed 25-Oct-2023 16:37)

## 2023-10-26 NOTE — CDI QUERY NOTE - NSCDIOTHERTXTBX2_GEN_ALL_CORE_FT
Could you please further clarify a clinical diagnosis associated with radiology findings , assessment  and treatment /monitoring      - Intraabdominal fluid collection associated with intra abdominal/intra peritoneal abscess  - intra abdominal fluid collection associated with pelvic abscess  - other please specify    Chart documentation    CT-oral contrast not IV-< from: CT Abdomen and Pelvis w/ Oral Cont (10.20.23 @ 12:14) >IMPRESSION:Limited study due to lack of intravenous contrast.Decrease in size of retropancreatic fluid collection.  New small bilateral pleural effusions.    CT Abdomen and Pelvis w/ Oral Cont and w/ IV Cont (10.25.23 @ 12:45) >IMPRESSION:Postsurgical anatomy from Whipple procedure.Interval minimal decrease in size of the localized fluid collection in   the surgical bed and the pancreatic head resection site posterior to proximal SMV and splenic vein confluence.Small, focal, nonocclusive filling defect in the proximal main portal vein raises concern for focal partial non occlusive thrombosis .Interval development of a small volume ascites.Moderate bilateral pleural effusions.    DC summary-[id number] [facility] F with hx of pancreatic cancer, s/p 6 months of neoadjuvant FOLFIRINOX chemotherapy and pre-operative RT (completed about 2 months prior) followed by Whipple resection with Dr. Tolentino on 9/21/23. Pt presented with low appetite, chronic diarrhea, found to have small retropancreatic collection. Pt is doing well, tolerating diet, having bowel function. IV abx are set up for home.    H&P-This patient is well known to me. I agree with the assessment. She is currently stable but was clearly septic. We will assess he for the need for drainage. The retroperitoneal fluid is in a difficult location to drain percutaneously so I have communicated with Dr Frost for possible endoscopic drainage if she shows signs of not responding. She remains hemodynamically stable at the present time and her WBC has normailzed. I added Flagyl until all cultures and sensitivities are back.

## 2023-10-27 NOTE — CHART NOTE - NSCHARTNOTEFT_GEN_A_CORE
In response to the CQI. This patient was admitted with an Albumin of 1.5 and BMI of 18. Based on these objective measures and her history of recent surgery and poor po intake she met the criteria for severe protein calorie malnutrition. Her clinical diagnosis was sepsis due to an intra abdominal abscess. Her condition rapidly improved with hydration and IV antibiotics.   Chandler Tolentino Jr., MD In response to the CQI. This patient was admitted with an Albumin of 1.5 and BMI of 18. Based on these objective measures and her history of recent surgery and poor po intake she met the criteria for severe protein calorie malnutrition. She was clearly NOT "Well-nourished" Her clinical diagnosis was sepsis due to an intra abdominal abscess. Her condition rapidly improved with hydration and IV antibiotics.   Chandler Tolentino Jr., MD

## 2023-12-19 NOTE — H&P PST ADULT - TEMPERATURE IN FAHRENHEIT (DEGREES F)
Returned call, notified pt that we are still waiting for final approval from the insurance company, confirmed that packet has been sent.    Melania Barnes [facility] Pre-Kidney Transplant Clinical  Caller: Quiana (Today,  8:10 AM)  Regarding:  Pt Status        Name Of Caller: Quiana        Contact Preference: [phone]          Nature of call:  pt  is calling to check  Status, pt is really anxious to know what her next step is, Please advise. Requesting  a call  back.   Refilled for 90 day supply per patients request.   97.8

## 2024-01-01 ENCOUNTER — NON-APPOINTMENT (OUTPATIENT)
Age: 64
End: 2024-01-01

## 2024-01-22 NOTE — ASU PATIENT PROFILE, ADULT - ANESTHESIA, PREVIOUS REACTION, PROFILE
[address] [place] [address],  [address]  [place] [address]  Phone: [phone]   Fax:[phone]  [address] [place] [address] ,  [address]  [place] [address]  Phone: [phone]   Fax:[phone]      Colonoscopy Procedure Note    Patient: Na Johnston  : 1956    Procedure: Colonoscopy with polypectomy (cold snare), polypectomy (cold biopsy)    Date:  2024     Endoscopist:  Chris Monique MD    Referring Physician:  Burt Goyal MD    Preoperative Diagnosis:  History of colonic polyps [Z86.010]    Postoperative Diagnosis:  cecal polyp, transverse colon polyp, descending colon polyp, internal hemorrhoids    Anesthesia: Anesthesia: MAC  Sedation: Propofol per anesthesia  Start Time: 07:39  Stop Time: 08:02  ASA Class: 2  Mallampati: II (soft palate, uvula, fauces visible)    Indications: This is a 67 y.o. year old female  with medical history of COPD, peripheral artery disease, descending thoracic aortic aneurysm, tobacco use, history of non-small cell lung cancer status post lobectomy follows for surveillance colonoscopy.      Procedure Details  Informed consent was obtained for the procedure, including sedation.  Risks of perforation, hemorrhage, adverse drug reaction and aspiration were discussed. The patient was placed in the left lateral decubitus position.  Based on the pre-procedure assessment, including review of the patient's medical history, medications, allergies, and review of systems, she had been deemed to be an appropriate candidate for above IV sedation; she was therefore sedated and monitored continuously with ECG tracing, pulse oximetry, blood pressure monitoring, and direct observations. Rectal examination was performed.  There were no external hemorrhoids, fissures or skin tags.  The colonoscope was inserted into the rectum and advanced under direct vision to the cecum, which was identified by the ileocecal valve.  The right colon was examined twice as this increases polyp detection  none

## 2024-06-14 NOTE — OPERATIVE REPORT - NSICDXBRIEFPREOP_GEN_ALL_CORE_FT
denies pain/discomfort (Rating = 0) PRE-OP DIAGNOSIS:  Pancreas cancer 21-Sep-2023 15:06:02  Nisha Ellison

## 2025-05-07 NOTE — DISCHARGE NOTE NURSING/CASE MANAGEMENT/SOCIAL WORK - NSDCPEFALRISK_GEN_ALL_CORE
Detail Level: Zone Otc Regimen: T gel shampoo Continue Regimen: ketoconazole 2 % shampoo : Shampoo scalp and lather on face 2-3 times weekly, work into a lather, and leave on for 5-10 minutes before rinsing with water.\\n\\nDerma-Smoothe/FS Scalp Oil 0.01 % : Apply to affected areas twice a day for up to two weeks then only use as needed for itching or irritation. Continue Regimen: triamcinolone acetonide 0.1 % topical ointment : Apply twice per day to the rash on the hands. Only use as needed for itching or irritation. Continue Regimen: Derma-Smoothe/FS Scalp Oil 0.01 % BID prn hair loss, itching or irritation For information on Fall & Injury Prevention, visit: [email or web address] OR  [email or web address] OR  https://www.cdc.gov/steadi/patient.html

## (undated) DEVICE — SUT PROLENE 4-0 36" RB-1

## (undated) DEVICE — SUT PDS II 5-0 27" RB-1

## (undated) DEVICE — FOLEY TRAY 16FR 5CC LF UMETER CLOSED

## (undated) DEVICE — LABELS BLANK W PEN

## (undated) DEVICE — SUT MONOCRYL 4-0 27" PS-2 UNDYED

## (undated) DEVICE — PACK CARDIOVASCULAR UNIVERSAL

## (undated) DEVICE — STAPLER ECHELON FLEX POWERED PLUS 340MM

## (undated) DEVICE — CANISTER DISPOSABLE THIN WALL 3000CC

## (undated) DEVICE — SPONGE DISSECTOR PEANUT

## (undated) DEVICE — STAPLER ECHELON FLEX POWERED ADV PLCMT TIP

## (undated) DEVICE — DRAPE IOBAN 23" X 23"

## (undated) DEVICE — WARMING BLANKET FULL UNDERBODY

## (undated) DEVICE — SUT BOOT STANDARD (YELLOW) 5 PAIR

## (undated) DEVICE — SOL IRR POUR H2O 1500ML

## (undated) DEVICE — POSITIONER STRAP ARMBOARD VELCRO TS-30

## (undated) DEVICE — SUT PDS II 3-0 27" SH

## (undated) DEVICE — SUT PDS II 1 48" TP-1

## (undated) DEVICE — SUT SILK 3-0 30" KS

## (undated) DEVICE — ELCTR HANDPIECE ARGON BEND A BEAM 6"

## (undated) DEVICE — HANDPIECE SINGLE FUNCTION ABC

## (undated) DEVICE — SUT SILK 2-0 18" FS

## (undated) DEVICE — Device

## (undated) DEVICE — VESSEL LOOP MAXI-YELLOW  0.120" X 16"

## (undated) DEVICE — SUT PDS II 5-0 30" RB-2

## (undated) DEVICE — SUT PROLENE 5-0 36" RB-1

## (undated) DEVICE — SUT VICRYL 3-0 18" SH UNDYED (POP-OFF)

## (undated) DEVICE — DRAPE LAPAROTOMY TRANSVERSE

## (undated) DEVICE — ELCTR GROUNDING PAD ADULT CONMED

## (undated) DEVICE — ELCTR BOVIE PENCIL SMOKE EVACUATION

## (undated) DEVICE — LIGASURE IMPACT

## (undated) DEVICE — SUT SILK 3-0 18" SH (POP-OFF)

## (undated) DEVICE — DRAIN PENROSE .25" X 18" LATEX

## (undated) DEVICE — DRSG PREVENA PLUS SYSTEM

## (undated) DEVICE — FOLEY CATH 2-WAY 16FR TEMP-SENSING

## (undated) DEVICE — SUT PDS II 4-0 27" RB-1

## (undated) DEVICE — STAPLER COVIDIEN ENDO GIA SHORT HANDLE

## (undated) DEVICE — PACK MAJOR ABDOMINAL WITH LAP

## (undated) DEVICE — SUT SILK 3-0 18" TIES

## (undated) DEVICE — FEEDING TUBE NG ARGYLE PVC 8FR 41CM

## (undated) DEVICE — SUT SILK 0 18" TIES

## (undated) DEVICE — STAPLER SKIN VISI-STAT 35 WIDE

## (undated) DEVICE — SUT NYLON 2-0 18" FS

## (undated) DEVICE — FEEDING TUBE NG KANGAROO POLYURETHANE 5FR 51CM ENFIT

## (undated) DEVICE — DRSG TAPE UMBILICAL COTTON 2" X 30 X 1/8"

## (undated) DEVICE — ELCTR BOVIE BLADE .75"

## (undated) DEVICE — SUT SILK 2-0 18" SH (POP-OFF)

## (undated) DEVICE — VENODYNE/SCD SLEEVE CALF MEDIUM

## (undated) DEVICE — GLV 7.5 PROTEXIS (WHITE)

## (undated) DEVICE — SOL IRR POUR NS 0.9% 1500ML

## (undated) DEVICE — DRAIN RESERVOIR FOR JACKSON PRATT 100CC CARDINAL

## (undated) DEVICE — SUT PROLENE 3-0 36" SH

## (undated) DEVICE — DRAPE TOWEL BLUE 17" X 24"

## (undated) DEVICE — GLV 8 PROTEXIS (CREAM) MICRO

## (undated) DEVICE — ELCTR BOVIE BLADE 3/4" EXTENDED LENGTH 6"